# Patient Record
Sex: FEMALE | Race: WHITE | NOT HISPANIC OR LATINO | Employment: FULL TIME | URBAN - METROPOLITAN AREA
[De-identification: names, ages, dates, MRNs, and addresses within clinical notes are randomized per-mention and may not be internally consistent; named-entity substitution may affect disease eponyms.]

---

## 2017-05-08 ENCOUNTER — TELEPHONE (OUTPATIENT)
Dept: FAMILY MEDICINE | Facility: CLINIC | Age: 52
End: 2017-05-08

## 2017-05-08 ENCOUNTER — TELEPHONE (OUTPATIENT)
Dept: NURSING | Facility: CLINIC | Age: 52
End: 2017-05-08

## 2017-05-08 NOTE — TELEPHONE ENCOUNTER
Call Type: Triage Call    Presenting Problem: 2 month history of dizziness.   [facility] administrator Cat MONTAGUE calling, noted appt in clinic tomorrow  (5/9/17) at 16:00 for evaluation of 2 month history of dizziness.  Cat requesting patient be reached out to, to be scheduled in  regular clinic as [facility] not appropriate for such a  visit.  Did attempt to reach patient at 4:10, general message let to  call back to [facility] or if clinic closed, to choose triage  nurse option.  Patient needs appt rescheduled in regular clinic.  Triaged by FNA to be seen within 8 hours, for call placed at 10:33  am.  Triage Note:  Guideline Title: Information Only Call; No Symptom Triage (Adult)  Recommended Disposition: Provide Information or Advice Only  Original Inclination:  Override Disposition:  Intended Action:  Physician Contacted: No  Follow-up call to recent contact; no triage required. Information provided from  past call documentation, approved references or experience. ?  YES  Requesting regular office appointment ? NO  Sign(s) or symptom(s) associated with a diagnosed condition or with a new illness  ? NO  Requesting information about provider, services or community resources ? NO  Call back to complete assessment/clarification of information from prior caller to  complete triage ? NO  Requesting information and provider is best resource; no triage required. ? NO  Caller not with patient and is unable to provide clinical information about  patient to facilitate triage. ? NO  Requesting provider information for recently scheduled test, procedure; no triage  required. Needed information not available per approved resources or clinical  experience. ? NO  Requesting information not available per approved reference or clinical  experience; no triage required. ? NO  Requesting information regarding scheduled exam, test or procedure; no triage  required. Information provided from approved resources or  clinical experience. ?  NO  General information question; no triage required. Information provided from  approved references or knowledge of organization. ? NO  Health information question; person denies any symptoms, no triage required.  Information provided from approved references or clinical experience. ? NO  Physician Instructions:  Care Advice:

## 2017-05-08 NOTE — TELEPHONE ENCOUNTER
"Call Type: Triage Call    Presenting Problem: Perri is having \"dizziness\"  that started over a  couple of months ago.   No other symptoms.  [facility] ([facility] Triage/Dizziness or  Vertigo/disposition is to be seen within 8 hours and Perri agreed.  Triage Note:  Guideline Title: Dizziness or Vertigo  Recommended Disposition: See Provider within 8 Hours  Original Inclination: Wanted to speak with a nurse  Override Disposition:  Intended Action: Call PCP/HCP  Physician Contacted: No  Having sensations of turning or spinning that affects balance AND not responsive  to 4 hours of home care ?  YES  Any other GI bleeding ? NO  History of stroke OR transient ischemic attack (TIA) AND symptoms are similar ? NO  Signs of dehydration ? NO  Unconscious now ? NO  Severe breathing problems ? NO  New or worsening signs and symptoms that may indicate shock ? NO  Unusual vaginal bleeding occurring at times other than regular menses ? NO  New seizure now or within last 6 hours ? NO  Passing red, black or tarry material from rectum AND onset of new signs and  symptoms of hypovolemia ? NO  Unbearable abdominal/pelvic pain ? NO  Trauma from high-energy mechanism ? NO  Abruptly stopped or decreased dose of corticosteroids ? NO  Vomiting red, bloody or coffee-ground material, more than streaks of blood or  scant amount (not following nosebleed within past day) ? NO  New onset of decreased or complete hearing loss (may be related to ringing in  ear/s). ? NO  Sudden, severe disabling head pain OR caller spontaneously verbalizes \"worst  headache of my life\" ? NO  Chest discomfort associated with shortness of breath, sweating, odd heartbeats or  different heart rate, nausea, vomiting, lightheadedness, or fainting lasting 5 or  more minutes now or within the last hour ? NO  Chest pain spreading to the shoulders, neck, jaw, in one or both arms, stomach or  back lasting 5 or more minutes now or within the last hour. Pain is NOT  associated with taking a deep " breath or a productive cough, movement, or touch to  a localized area. ? NO  Pressure, fullness, squeezing sensation or pain anywhere in the chest lasting 5 or  more minutes now or within the last hour. Pain is NOT associated with taking a  deep breath or a productive cough, movement, or touch to a localized area on the  chest. ? NO  Known or suspected recent alcohol, illicit drug use, or misuse of prescribed  medication within the last 7 days ? NO  New numbness, weakness or paralysis involving face, arm or leg, especially on same  side of body, loss of balance or coordination, confusion or trouble speaking  occurring now or within last 8 hours ? NO  Physician Instructions:  Care Advice: Call provider if symptoms worsen or new symptoms develop.  CAUTIONS  SYMPTOM / CONDITION MANAGEMENT  List, or take, all current prescription(s), nonprescription or alternative  medication(s) to provider for evaluation.  Lie still in a dimly lit room and avoid any sudden change in position.  Consider taking nonprescription motion sickness medication (Antivert,  Dramamine) according to package or pharmacist's directions.  Avoid caffeine (coffee, tea, some soft drinks, some energy drinks, and  chocolate), alcohol, and nicotine (any use of tobacco)  using these substances may worsen symptoms.  Call  if patient develops confusion, decreased level of  consciousness, chest pain lasting 5 minutes or more, shortness of breath,  or focal neurologic abnormalities such as facial droop or weakness of one  extremity.  DO NOT drive or operate dangerous equipment until condition evaluated.

## 2017-05-08 NOTE — TELEPHONE ENCOUNTER
Pt made an appt to be seen in [facility] clinic. Pt was triaged and it was determined that pt needs to be seen here in [place] clinic. Pt notified of this information and appt made for her to be seen 5/9/17.      Irene Mckeon RN

## 2017-05-08 NOTE — TELEPHONE ENCOUNTER
Pt returning call. Please call back.    She is available at [phone].    [facility] [place]

## 2017-05-09 ENCOUNTER — OFFICE VISIT (OUTPATIENT)
Dept: FAMILY MEDICINE | Facility: CLINIC | Age: 52
End: 2017-05-09
Payer: COMMERCIAL

## 2017-05-09 VITALS
BODY MASS INDEX: 24.85 KG/M2 | RESPIRATION RATE: 16 BRPM | DIASTOLIC BLOOD PRESSURE: 84 MMHG | HEIGHT: 63 IN | TEMPERATURE: 99.2 F | SYSTOLIC BLOOD PRESSURE: 131 MMHG | WEIGHT: 140.25 LBS | HEART RATE: 81 BPM

## 2017-05-09 DIAGNOSIS — R42 VERTIGO: Primary | ICD-10-CM

## 2017-05-09 DIAGNOSIS — H61.23 BILATERAL IMPACTED CERUMEN: ICD-10-CM

## 2017-05-09 DIAGNOSIS — H93.13 TINNITUS, BILATERAL: ICD-10-CM

## 2017-05-09 DIAGNOSIS — E03.9 HYPOTHYROIDISM, UNSPECIFIED TYPE: ICD-10-CM

## 2017-05-09 LAB
T4 FREE SERPL-MCNC: 1.54 NG/DL (ref 0.76–1.46)
TSH SERPL DL<=0.05 MIU/L-ACNC: 0.11 MU/L (ref 0.4–4)

## 2017-05-09 PROCEDURE — 84439 ASSAY OF FREE THYROXINE: CPT | Performed by: FAMILY MEDICINE

## 2017-05-09 PROCEDURE — 84443 ASSAY THYROID STIM HORMONE: CPT | Performed by: FAMILY MEDICINE

## 2017-05-09 PROCEDURE — 99214 OFFICE O/P EST MOD 30 MIN: CPT | Performed by: NURSE PRACTITIONER

## 2017-05-09 PROCEDURE — 36415 COLL VENOUS BLD VENIPUNCTURE: CPT | Performed by: FAMILY MEDICINE

## 2017-05-09 NOTE — PROGRESS NOTES
SUBJECTIVE:                                                    Perri Scherer is a 51 year old female who presents to clinic today for the following health issues:      Dizziness      Duration: 1-2 months    Description   Feeling faint:  YES-   Feeling like the surroundings are moving: YES  Loss of consciousness or falls: no     Intensity:  mild    Accompanying signs and symptoms:   Nausea/vomitting: no   Palpitations: no   Weakness in arms or legs: YES  Vision or speech changes: no   Ringing in ears (Tinnitus): YES - has had ringing in her ears for years.  Hearing loss related to dizziness: no   Other (fevers/chills/sweating/dyspnea): yes - has been feeling colder than normal.    Denies associated chest pain, nausea, dyspnea, headache, diaphoresis, heartburn.       History (similar episodes/head trauma/previous evaluation/recent bleeding): None    Precipitating or alleviating factors (new meds/chemicals): None  Worse with activity/head movement: no     Therapies tried and outcome: nothing       PROBLEMS TO ADD ON...  Hypothyroidism Follow-up      Since last visit, patient describes the following symptoms: Weight stable, no hair loss, no skin changes, no constipation, no loose stools       Problem list and histories reviewed & adjusted, as indicated.  Additional history: as documented    Patient Active Problem List   Diagnosis     Nicotine use disorder     Family history of diabetes mellitus     Hyperlipidemia LDL goal <160     Hypothyroidism     Past Surgical History:   Procedure Laterality Date     TUBAL LIGATION  1992       Social History   Substance Use Topics     Smoking status: Light Tobacco Smoker     Types: Cigarettes     Smokeless tobacco: Never Used      Comment: 1 cigarette very rarely,  otherwise E-cigs only     Alcohol use Yes      Comment: occasional     Family History   Problem Relation Age of Onset     DIABETES Mother      C.A.D. Father      Alzheimer Disease Maternal Grandfather            Reviewed  "and updated as needed this visit by clinical staff  Allergies  Meds  Problems       Reviewed and updated as needed this visit by Provider  Allergies  Meds  Problems         ROS:  Constitutional, HEENT, cardiovascular, pulmonary, gi and gu systems are negative, except as otherwise noted.    OBJECTIVE:                                                    /84  Pulse 81  Temp 99.2  F (37.3  C) (Tympanic)  Resp 16  Ht 5' 3.39\" (1.61 m)  Wt 140 lb 4 oz (63.6 kg)  BMI 24.54 kg/m2  Body mass index is 24.54 kg/(m^2).  GENERAL: healthy, alert and no distress  EYES: Eyes grossly normal to inspection, PERRL and conjunctivae and sclerae normal  HENT: normal cephalic/atraumatic, both ears: occluded with wax, nose and mouth without ulcers or lesions, oropharynx clear and oral mucous membranes moist  NECK: no adenopathy, no asymmetry, masses, or scars and thyroid normal to palpation  RESP: lungs clear to auscultation - no rales, rhonchi or wheezes  CV: regular rates and rhythm, normal S1 S2, no S3 or S4 and no murmur, click or rub  MS: no gross musculoskeletal defects noted, no edema  SKIN: no suspicious lesions or rashes  NEURO: Normal strength and tone, sensory exam grossly normal, mentation intact, cranial nerves 2-12 intact, DTR's normal and symmetric 2+, gait normal, Romberg normal and mal-hallpike negative bilateral.  PSYCH: mentation appears normal, affect normal/bright    Diagnostic Test Results:  none      ASSESSMENT/PLAN:                                                        ICD-10-CM    1. Vertigo R42 OTOLARYNGOLOGY REFERRAL   2. Tinnitus, bilateral H93.13 OTOLARYNGOLOGY REFERRAL   3. Bilateral impacted cerumen H61.23 carbamide peroxide (DEBROX) 6.5 % otic solution   4. Hypothyroidism, unspecified type E03.9 TSH     T4 FREE       CONSULTATION/REFERRAL to ENT for persistent sx. Evaluate for Meniere's disease? Exam is not consistent with positional vertigo, I don't think she would benefit from physical " therapy. Ears irrigated per MA, and upon recheck her right TM was normal. Make a MA ONLY appointment to have left ear flushed after at least 4 days of ear drops.    Patient Instructions       Impacted Earwax  Impacted earwax is a buildup of the natural wax in the ear (cerumen). Impacted earwax is very common. It can cause symptoms such as hearing loss. It can also stop a doctor doing an exam of your ear.  Understanding earwax  Tiny glands in your ear make substances that combine with dead skin cells to form earwax. Earwax helps protect your ear canal from water, dirt, infection, and injury. Over time, earwax travels from the inner part of your ear canal to the entrance of the canal. Then it falls away naturally. But in some cases, it can t travel to the entrance of the canal. This may be because of a health condition or objects put in the ear. With age, earwax tends to become harder and less fluid. Older adults are more likely to have problems with earwax buildup.  What causes impacted earwax?  Earwax can build up because of many health conditions. Some cause a physical blockage. Others cause too much earwax to be made. Health conditions that can cause earwax buildup include:    Bony blockage in the ear (osteoma or exostoses)    Infections, such as swimmer s ear (external otitis)    Skin disease, such as eczema    Autoimmune diseases, such as lupus    A narrowed ear canal from birth, chronic inflammation, or injury    Too much earwax because of injury    Too much earwax because of  water in the ear canal  Objects repeatedly placed in the ear can also cause impacted earwax. For example, putting cotton swabs in the ear may push the wax deeper into the ear. Over time, this may cause blockage. Hearing aids, swimming plugs, and swim molds can cause the same problem when used again and again.  In some cases, the cause of impacted earwax is not known.  Symptoms of impacted earwax  Excess earwax usually does not cause any  symptoms, unless there is a large amount of buildup. Then it may cause symptoms such as:    Hearing loss    Earache    Sense of ear fullness    Itching in the ear    Dizziness    Ringing in the ears    Cough  Treatment for impacted earwax  If you don t have symptoms, you may not need treatment. Often the earwax goes away on its own with time. If you have symptoms, you may have 1 or more treatments such as:    Ear drops. These help to soften the earwax. This helps it leave the ear over time.    Rinsing (irrigation) of the ear canal with water. This is done in a doctor s office.    Removal of the earwax with small tools. This is also done in a doctor s office.  In rare cases, some treatments for earwax removal may cause complications such as:    Swimmer s ear (otitis external)    Earache    Short-term hearing loss    Dizziness    Water trapped in the ear canal    Hole in the eardrum    Ringing in the ears    Bleeding from the ear  Talk with your health care provider about which risks apply most to you.  Don t use these at home  Health care providers do not advise use of ear candles or ear vacuum kits. These methods are not shown to work.   Preventing impacted earwax  You may not be able to prevent impacted earwax if you have a health condition that causes it, such as eczema. In other cases, you may be able to prevent earwax buildup by:    Using ear drops once a week    Having routine cleaning of the ear about every 6 months    Not using cotton swabs in the ear  When to call the health care provider  Call your health care provider right away if you have severe symptoms after earwax removal. These may include bleeding or severe ear pain.        6934-4899 The [employer]. [address] [place] [address], [place] [address]. All rights reserved. This information is not intended as a substitute for professional medical care. Always follow your healthcare professional's instructions.        Inner Ear Problems: Causes of  Dizziness (Vertigo)  Benign positional vertigo (BPV)    This is the most common cause of vertigo. BPV (also called benign positional paroxysmal vertigo or BPPV) results when crystals in the canals shift into the wrong position. Episodes usually occur when the head is moved in a certain way. This can happen when turning in bed, bending, or looking up.  BPV:    Causes episodes of vertigo that last for seconds. These episodes can occur several times a day, depending on body position.    Doesn t cause hearing loss.    Often goes away on its own, but may go away sooner with treatment.  Infection or inflammation    Sometimes the semicircular canals swell and send incorrect balance signals. This problem may be caused by a viral infection. Depending on the cause, hearing can be affected (labyrinthitis) or can remain normal (neuronitis).   Infection or inflammation:    Causes episodes of vertigo that last for hours or days. The first episode is usually the worst.    Can cause hearing loss.    Often goes away on its own, but may go away sooner with treatment.    May require vestibular rehabilitation if you have persistent imbalance.  Meniere s disease    Although uncommon, this condition happens when there is too much fluid in the canals. This causes increased pressure and swelling, and affects balance and hearing signals.  Meniere s disease may:    Cause episodes of vertigo that last for hours.    Cause fluctuating hearing problems, usually in one ear, that worsen over time.    Cause buzzing or ringing in the ears (tinnitus).    Cause a feeling of fullness or pressure in the ear.    Cause any of these symptoms: vertigo, hearing loss, tinnitus, or ear fullness to last a lifetime.    6232-6642 [employer]. [address] [place] [address], [place] [address]. All rights reserved. This information is not intended as a substitute for professional medical care. Always follow your healthcare professional's  instructions.        Vertigo (Unknown Cause)    In addition to helping with hearing, the inner ear is part of the balance center of your body. Problems with the inner ear can a false feeling of motion. This is called vertigo. Often, it feels as if you or the room is spinning. A vertigo attack may cause sudden nausea, vomiting and heavy sweating. Severe vertigo causes a loss of balance and can cause you to fall. During vertigo, small head movements and changes in body position will often make the symptoms worse. You may also have ringing in the ears called tinnitus.  An episode of vertigo may last seconds, minutes or hours. Once you are over the first episode, it may never come back. However, symptoms may return off and on.  The cause of your vertigo is not yet known. Possible causes of vertigo include:    Inflammation of the inner ear    Disease of the nerves to the inner ear    Movement of calcium particles in the inner ear    Poor blood flow to the balance centers of the brain    Migraine headaches  Home care    If symptoms are severe, rest quietly in bed. Change positions very slowly. There is usually one position that will feel best, such as lying on one side or lying on your back with your head slightly raised on pillows.    Do not drive a car or work with dangerous machinery until symptoms have been gone for at least one week.    Take medicine as prescribed to relieve your symptoms. Unless another medicine was prescribed for symptoms of nausea, vomiting, and dizziness, you may use over-the-counter motion sickness pills. Ask your pharmacist for suggestions.  Follow-up care  Follow up with your healthcare provider or as directed. If you are referred to a specialist or for testing, make the appointment promptly.  When to seek medical advice  Call your healthcare provider if any of the following occur:    Fever of 100.4 F (38 C) or higher, or as directed by your healthcare provider    Vertigo worsens or is not  controlled by prescribed medicine     Repeated vomiting not relieved by prescribed medicine     Severe headache    Confusion    Weakness of an arm or leg or one side of the face    Difficulty with speech or vision    Loss of consciousness     Seizure    1541-3720 [employer]. [address] [place] [address], [place] [address]. All rights reserved. This information is not intended as a substitute for professional medical care. Always follow your healthcare professional's instructions.        Tinnitus (Ringing in the Ears)  Tinnitus is the term for a noise in your ear not caused by an outside sound. The noise might be a ringing, clicking, hiss, or roar. It can vary in pitch and may be soft or quite loud. For some people, tinnitus is a minor nuisance. But for others, the noise can make it hard to hear, work, and even sleep. When tinnitus can't be cured, a number of treatments may offer relief.     A radio tuned to static or a masker may help block out bothersome tinnitus.     What Causes Tinnitus?  Loud noises, hearing loss, and ear wax can cause tinnitus. So can certain medications. Large amounts of aspirin or caffeine are sometimes to blame. In many cases, the exact cause of tinnitus is unknown.  How Is Tinnitus Treated?  Identifying and removing the cause is the best way to treat tinnitus. For that reason, your health care provider may refer you to an otolaryngologist (ear, nose, and throat doctor). Your hearing may also be checked by an audiologist (hearing specialist). If you have hearing loss, wearing a hearing aid may help your tinnitus. When the cause can't be found, the tinnitus itself may be treated. Some of the treatments are listed below: Your health care provider can tell you more about them:    Maskers are small devices that look like hearing aids. They emit a pleasant sound that helps cover up the ringing in your ears. Hearing aids and maskers are sometimes used together.    Medications that treat  anxiety and depression may ease tinnitus in some people.    Hypnosis or relaxation therapy may help head noise seem less severe.    Tinnitus retraining therapy combines counseling and maskers. Both can help take your mind off the tinnitus.  For More Information    American Speech-Hearing-Language Association  119.425.6371  www.patricia.org    American Tinnitus Association  749.693.3320  www.xander.org    [employer] [place] on Deafness and other Communication Disorders  469.218.7683  www.nidcd.nih.gov     1146-4576 [employer]. [address] [place] [address]. All rights reserved. This information is not intended as a substitute for professional medical care. Always follow your healthcare professional's instructions.        Meniere s Disease    Meniere s disease is a chronic recurring condition. It is due to a problem with the inner ear, the part of the ear responsible for balance as well as hearing. Symptoms include:    Sudden attacks of vertigo: Vertigo is a spinning or whirling feeling that causes balance problems. These attacks often include nausea, vomiting, and sweating. During an attack of vertigo, head movement and body position changes will worsen symptoms.    Hearing problems: Hearing is often partly or completely lost during the vertigo attack, then comes back. Over time, though, hearing can be affected.    Tinnitus: This is ringing, buzzing, whistling, or roaring noises in the ear. It may come and go or always be present.     A feeling of pressure of fullness in the ear.  Attacks may occur weeks, months, or even years apart. Each episode of vertigo may last 20 minutes to several hours. The symptoms are due to changes in fluid in the inner ear canals, but the exact cause is not known.   Treatment for Meniere s disease includes lifestyle changes, medicines, and medical procedures. Surgery may be recommended in severe cases.  Home care  Medicines  You may be prescribed medicine to take  regularly to help prevent attacks. You may also be prescribed medicine to take only during attacks. Take these as directed.  Lifestyle changes  These lifestyle changes can help make attacks less frequent or less severe.     Reduce your salt intake. Eating too much salt can make this condition worse. A common recommendation is to limit sodium to no more than 2,300 mg a day. Talk to your healthcare provider about ways to limit sodium in your diet.    Quit smoking.    Limit alcohol and caffeine.    Try to limit stress.  During Attacks  If an attack is about to start or has started, take any medicine you have been prescribed for an attack. Lie down on a firm surface in a darkened room. Stay as still as possible. Avoid bright light. Do not try to read or watch TV. Don't get up until the spinning passes.  Follow-up care  Follow up with your healthcare provider for further evaluation and treatment. If you have been referred to a specialist, schedule that appointment promptly.  When to seek medical advice  Call your healthcare provider right away if you have:    Worsening of symptoms    Increased weakness or fainting    Headache or unusual drowsiness    Difficulty with speech or movement    Fever of 100.4 F (38 C) or higher, or as directed by your healthcare provider    2170-8537 [employer]. [address] [place] [address], [place] [id number]. All rights reserved. This information is not intended as a substitute for professional medical care. Always follow your healthcare professional's instructions.        Treating Meniere s Disease: Lifestyle Changes    Certain changes may help you manage Meniere s disease. Some of these changes are minor. Others require more work. They include avoiding certain substances. Special devices may also help make you more comfortable and improve your hearing.  Avoid Certain Substances  Certain substances affect how your body regulates fluid, and can make Meniere s disease worse. These  include:    Caffeine. Caffeine narrows your blood vessels. This reduces blood flow to your inner ear. Avoid drinks and foods that are high in caffeine, such as coffee, cola, and chocolate.    Alcohol. Alcohol can upset your sense of balance. Avoid alcohol, or limit it to very small amounts.    Smoking. Tobacco smoke narrows your blood vessels, weakens your immune system, and harms your general health. By affecting your circulation, smoking may contribute to Meniere s symptoms. Quitting smoking is always a good idea.     A hearing aid can help improve your hearing.        A radio tuned to static may help block out bothersome tinnitus.   Explore Helpful Devices  If Meniere s disease has permanently affected your hearing, a hearing aid may help you hear better. Hearing aids come in many different models. You can find one that is best for your needs and lifestyle. Other devices can help cover up tinnitus. A fan or a radio tuned to music or static, or a white-noise device specifically designed to create background noise may help. A masking device that makes white noise all the time can be worn directly in the ear. Ask your health care provider if these devices are right for you.  Pay Attention to Your Body  People with Meniere s disease sometimes find that such things as bright lights, loud noises, or very low sounds bring on symptoms or make the symptoms worse. Pay attention to how you feel. If something makes you feel worse, talk with your health care provider.    8364-0638 The [employer]. [address] [place] [address], [place] [address]. All rights reserved. This information is not intended as a substitute for professional medical care. Always follow your healthcare professional's instructions.            KATEY Castro [facility]

## 2017-05-09 NOTE — PATIENT INSTRUCTIONS
Impacted Earwax  Impacted earwax is a buildup of the natural wax in the ear (cerumen). Impacted earwax is very common. It can cause symptoms such as hearing loss. It can also stop a doctor doing an exam of your ear.  Understanding earwax  Tiny glands in your ear make substances that combine with dead skin cells to form earwax. Earwax helps protect your ear canal from water, dirt, infection, and injury. Over time, earwax travels from the inner part of your ear canal to the entrance of the canal. Then it falls away naturally. But in some cases, it can t travel to the entrance of the canal. This may be because of a health condition or objects put in the ear. With age, earwax tends to become harder and less fluid. Older adults are more likely to have problems with earwax buildup.  What causes impacted earwax?  Earwax can build up because of many health conditions. Some cause a physical blockage. Others cause too much earwax to be made. Health conditions that can cause earwax buildup include:    Bony blockage in the ear (osteoma or exostoses)    Infections, such as swimmer s ear (external otitis)    Skin disease, such as eczema    Autoimmune diseases, such as lupus    A narrowed ear canal from birth, chronic inflammation, or injury    Too much earwax because of injury    Too much earwax because of  water in the ear canal  Objects repeatedly placed in the ear can also cause impacted earwax. For example, putting cotton swabs in the ear may push the wax deeper into the ear. Over time, this may cause blockage. Hearing aids, swimming plugs, and swim molds can cause the same problem when used again and again.  In some cases, the cause of impacted earwax is not known.  Symptoms of impacted earwax  Excess earwax usually does not cause any symptoms, unless there is a large amount of buildup. Then it may cause symptoms such as:    Hearing loss    Earache    Sense of ear fullness    Itching in the ear    Dizziness    Ringing in  the ears    Cough  Treatment for impacted earwax  If you don t have symptoms, you may not need treatment. Often the earwax goes away on its own with time. If you have symptoms, you may have 1 or more treatments such as:    Ear drops. These help to soften the earwax. This helps it leave the ear over time.    Rinsing (irrigation) of the ear canal with water. This is done in a doctor s office.    Removal of the earwax with small tools. This is also done in a doctor s office.  In rare cases, some treatments for earwax removal may cause complications such as:    Swimmer s ear (otitis external)    Earache    Short-term hearing loss    Dizziness    Water trapped in the ear canal    Hole in the eardrum    Ringing in the ears    Bleeding from the ear  Talk with your health care provider about which risks apply most to you.  Don t use these at home  Health care providers do not advise use of ear candles or ear vacuum kits. These methods are not shown to work.   Preventing impacted earwax  You may not be able to prevent impacted earwax if you have a health condition that causes it, such as eczema. In other cases, you may be able to prevent earwax buildup by:    Using ear drops once a week    Having routine cleaning of the ear about every 6 months    Not using cotton swabs in the ear  When to call the health care provider  Call your health care provider right away if you have severe symptoms after earwax removal. These may include bleeding or severe ear pain.        6874-3275 [employer]. [address] [place], [place] [id number]. All rights reserved. This information is not intended as a substitute for professional medical care. Always follow your healthcare professional's instructions.        Inner Ear Problems: Causes of Dizziness (Vertigo)  Benign positional vertigo (BPV)    This is the most common cause of vertigo. BPV (also called benign positional paroxysmal vertigo or BPPV) results when crystals in the  canals shift into the wrong position. Episodes usually occur when the head is moved in a certain way. This can happen when turning in bed, bending, or looking up.  BPV:    Causes episodes of vertigo that last for seconds. These episodes can occur several times a day, depending on body position.    Doesn t cause hearing loss.    Often goes away on its own, but may go away sooner with treatment.  Infection or inflammation    Sometimes the semicircular canals swell and send incorrect balance signals. This problem may be caused by a viral infection. Depending on the cause, hearing can be affected (labyrinthitis) or can remain normal (neuronitis).   Infection or inflammation:    Causes episodes of vertigo that last for hours or days. The first episode is usually the worst.    Can cause hearing loss.    Often goes away on its own, but may go away sooner with treatment.    May require vestibular rehabilitation if you have persistent imbalance.  Meniere s disease    Although uncommon, this condition happens when there is too much fluid in the canals. This causes increased pressure and swelling, and affects balance and hearing signals.  Meniere s disease may:    Cause episodes of vertigo that last for hours.    Cause fluctuating hearing problems, usually in one ear, that worsen over time.    Cause buzzing or ringing in the ears (tinnitus).    Cause a feeling of fullness or pressure in the ear.    Cause any of these symptoms: vertigo, hearing loss, tinnitus, or ear fullness to last a lifetime.    5023-6739 [employer]. [address] [place] [id number]. All rights reserved. This information is not intended as a substitute for professional medical care. Always follow your healthcare professional's instructions.        Vertigo (Unknown Cause)    In addition to helping with hearing, the inner ear is part of the balance center of your body. Problems with the inner ear can a false feeling of motion. This is  called vertigo. Often, it feels as if you or the room is spinning. A vertigo attack may cause sudden nausea, vomiting and heavy sweating. Severe vertigo causes a loss of balance and can cause you to fall. During vertigo, small head movements and changes in body position will often make the symptoms worse. You may also have ringing in the ears called tinnitus.  An episode of vertigo may last seconds, minutes or hours. Once you are over the first episode, it may never come back. However, symptoms may return off and on.  The cause of your vertigo is not yet known. Possible causes of vertigo include:    Inflammation of the inner ear    Disease of the nerves to the inner ear    Movement of calcium particles in the inner ear    Poor blood flow to the balance centers of the brain    Migraine headaches  Home care    If symptoms are severe, rest quietly in bed. Change positions very slowly. There is usually one position that will feel best, such as lying on one side or lying on your back with your head slightly raised on pillows.    Do not drive a car or work with dangerous machinery until symptoms have been gone for at least one week.    Take medicine as prescribed to relieve your symptoms. Unless another medicine was prescribed for symptoms of nausea, vomiting, and dizziness, you may use over-the-counter motion sickness pills. Ask your pharmacist for suggestions.  Follow-up care  Follow up with your healthcare provider or as directed. If you are referred to a specialist or for testing, make the appointment promptly.  When to seek medical advice  Call your healthcare provider if any of the following occur:    Fever of 100.4 F (38 C) or higher, or as directed by your healthcare provider    Vertigo worsens or is not controlled by prescribed medicine     Repeated vomiting not relieved by prescribed medicine     Severe headache    Confusion    Weakness of an arm or leg or one side of the face    Difficulty with speech or  vision    Loss of consciousness     Seizure    3683-2107 [employer]. [address] [place] [address], [place] [address]. All rights reserved. This information is not intended as a substitute for professional medical care. Always follow your healthcare professional's instructions.        Tinnitus (Ringing in the Ears)  Tinnitus is the term for a noise in your ear not caused by an outside sound. The noise might be a ringing, clicking, hiss, or roar. It can vary in pitch and may be soft or quite loud. For some people, tinnitus is a minor nuisance. But for others, the noise can make it hard to hear, work, and even sleep. When tinnitus can't be cured, a number of treatments may offer relief.     A radio tuned to static or a masker may help block out bothersome tinnitus.     What Causes Tinnitus?  Loud noises, hearing loss, and ear wax can cause tinnitus. So can certain medications. Large amounts of aspirin or caffeine are sometimes to blame. In many cases, the exact cause of tinnitus is unknown.  How Is Tinnitus Treated?  Identifying and removing the cause is the best way to treat tinnitus. For that reason, your health care provider may refer you to an otolaryngologist (ear, nose, and throat doctor). Your hearing may also be checked by an audiologist (hearing specialist). If you have hearing loss, wearing a hearing aid may help your tinnitus. When the cause can't be found, the tinnitus itself may be treated. Some of the treatments are listed below: Your health care provider can tell you more about them:    Maskers are small devices that look like hearing aids. They emit a pleasant sound that helps cover up the ringing in your ears. Hearing aids and maskers are sometimes used together.    Medications that treat anxiety and depression may ease tinnitus in some people.    Hypnosis or relaxation therapy may help head noise seem less severe.    Tinnitus retraining therapy combines counseling and maskers. Both can help  take your mind off the tinnitus.  For More Information    American Speech-Hearing-Language Association  [phone]  www.patricia.org    American Tinnitus Association  581.256.3876  www.xander.org    National [employer]ness [employer] other Communication Disorders  402.908.9198  www.nidcd.nih.gov     4924-4847 [employer]. [address] [place] [address]. All rights reserved. This information is not intended as a substitute for professional medical care. Always follow your healthcare professional's instructions.        Meniere s Disease    Meniere s disease is a chronic recurring condition. It is due to a problem with the inner ear, the part of the ear responsible for balance as well as hearing. Symptoms include:    Sudden attacks of vertigo: Vertigo is a spinning or whirling feeling that causes balance problems. These attacks often include nausea, vomiting, and sweating. During an attack of vertigo, head movement and body position changes will worsen symptoms.    Hearing problems: Hearing is often partly or completely lost during the vertigo attack, then comes back. Over time, though, hearing can be affected.    Tinnitus: This is ringing, buzzing, whistling, or roaring noises in the ear. It may come and go or always be present.     A feeling of pressure of fullness in the ear.  Attacks may occur weeks, months, or even years apart. Each episode of vertigo may last 20 minutes to several hours. The symptoms are due to changes in fluid in the inner ear canals, but the exact cause is not known.   Treatment for Meniere s disease includes lifestyle changes, medicines, and medical procedures. Surgery may be recommended in severe cases.  Home care  Medicines  You may be prescribed medicine to take regularly to help prevent attacks. You may also be prescribed medicine to take only during attacks. Take these as directed.  Lifestyle changes  These lifestyle changes can help make attacks less frequent or less  severe.     Reduce your salt intake. Eating too much salt can make this condition worse. A common recommendation is to limit sodium to no more than 2,300 mg a day. Talk to your healthcare provider about ways to limit sodium in your diet.    Quit smoking.    Limit alcohol and caffeine.    Try to limit stress.  During Attacks  If an attack is about to start or has started, take any medicine you have been prescribed for an attack. Lie down on a firm surface in a darkened room. Stay as still as possible. Avoid bright light. Do not try to read or watch TV. Don't get up until the spinning passes.  Follow-up care  Follow up with your healthcare provider for further evaluation and treatment. If you have been referred to a specialist, schedule that appointment promptly.  When to seek medical advice  Call your healthcare provider right away if you have:    Worsening of symptoms    Increased weakness or fainting    Headache or unusual drowsiness    Difficulty with speech or movement    Fever of 100.4 F (38 C) or higher, or as directed by your healthcare provider    8474-9619 [employer]. [address] [place] [address]. All rights reserved. This information is not intended as a substitute for professional medical care. Always follow your healthcare professional's instructions.        Treating Meniere s Disease: Lifestyle Changes    Certain changes may help you manage Meniere s disease. Some of these changes are minor. Others require more work. They include avoiding certain substances. Special devices may also help make you more comfortable and improve your hearing.  Avoid Certain Substances  Certain substances affect how your body regulates fluid, and can make Meniere s disease worse. These include:    Caffeine. Caffeine narrows your blood vessels. This reduces blood flow to your inner ear. Avoid drinks and foods that are high in caffeine, such as coffee, cola, and chocolate.    Alcohol. Alcohol can  upset your sense of balance. Avoid alcohol, or limit it to very small amounts.    Smoking. Tobacco smoke narrows your blood vessels, weakens your immune system, and harms your general health. By affecting your circulation, smoking may contribute to Meniere s symptoms. Quitting smoking is always a good idea.     A hearing aid can help improve your hearing.        A radio tuned to static may help block out bothersome tinnitus.   Explore Helpful Devices  If Meniere s disease has permanently affected your hearing, a hearing aid may help you hear better. Hearing aids come in many different models. You can find one that is best for your needs and lifestyle. Other devices can help cover up tinnitus. A fan or a radio tuned to music or static, or a white-noise device specifically designed to create background noise may help. A masking device that makes white noise all the time can be worn directly in the ear. Ask your health care provider if these devices are right for you.  Pay Attention to Your Body  People with Meniere s disease sometimes find that such things as bright lights, loud noises, or very low sounds bring on symptoms or make the symptoms worse. Pay attention to how you feel. If something makes you feel worse, talk with your health care provider.    0579-8921 [employer]. [address] [place] [address], [place] [address]. All rights reserved. This information is not intended as a substitute for professional medical care. Always follow your healthcare professional's instructions.

## 2017-05-09 NOTE — MR AVS SNAPSHOT
After Visit Summary   5/9/2017    Perri Scherer    MRN: [id number]           Patient Information     Date Of Birth          1965        Visit Information        Provider Department      5/9/2017 3:00 PM Bhavna Rosen APRN [facility]        Today's Diagnoses     Vertigo    -  1    Hypothyroidism, unspecified type        Tinnitus, bilateral        Bilateral impacted cerumen          Care Instructions      Impacted Earwax  Impacted earwax is a buildup of the natural wax in the ear (cerumen). Impacted earwax is very common. It can cause symptoms such as hearing loss. It can also stop a doctor doing an exam of your ear.  Understanding earwax  Tiny glands in your ear make substances that combine with dead skin cells to form earwax. Earwax helps protect your ear canal from water, dirt, infection, and injury. Over time, earwax travels from the inner part of your ear canal to the entrance of the canal. Then it falls away naturally. But in some cases, it can t travel to the entrance of the canal. This may be because of a health condition or objects put in the ear. With age, earwax tends to become harder and less fluid. Older adults are more likely to have problems with earwax buildup.  What causes impacted earwax?  Earwax can build up because of many health conditions. Some cause a physical blockage. Others cause too much earwax to be made. Health conditions that can cause earwax buildup include:    Bony blockage in the ear (osteoma or exostoses)    Infections, such as swimmer s ear (external otitis)    Skin disease, such as eczema    Autoimmune diseases, such as lupus    A narrowed ear canal from birth, chronic inflammation, or injury    Too much earwax because of injury    Too much earwax because of  water in the ear canal  Objects repeatedly placed in the ear can also cause impacted earwax. For example, putting cotton swabs in the ear may push the wax deeper into the ear. Over  time, this may cause blockage. Hearing aids, swimming plugs, and swim molds can cause the same problem when used again and again.  In some cases, the cause of impacted earwax is not known.  Symptoms of impacted earwax  Excess earwax usually does not cause any symptoms, unless there is a large amount of buildup. Then it may cause symptoms such as:    Hearing loss    Earache    Sense of ear fullness    Itching in the ear    Dizziness    Ringing in the ears    Cough  Treatment for impacted earwax  If you don t have symptoms, you may not need treatment. Often the earwax goes away on its own with time. If you have symptoms, you may have 1 or more treatments such as:    Ear drops. These help to soften the earwax. This helps it leave the ear over time.    Rinsing (irrigation) of the ear canal with water. This is done in a doctor s office.    Removal of the earwax with small tools. This is also done in a doctor s office.  In rare cases, some treatments for earwax removal may cause complications such as:    Swimmer s ear (otitis external)    Earache    Short-term hearing loss    Dizziness    Water trapped in the ear canal    Hole in the eardrum    Ringing in the ears    Bleeding from the ear  Talk with your health care provider about which risks apply most to you.  Don t use these at home  Health care providers do not advise use of ear candles or ear vacuum kits. These methods are not shown to work.   Preventing impacted earwax  You may not be able to prevent impacted earwax if you have a health condition that causes it, such as eczema. In other cases, you may be able to prevent earwax buildup by:    Using ear drops once a week    Having routine cleaning of the ear about every 6 months    Not using cotton swabs in the ear  When to call the health care provider  Call your health care provider right away if you have severe symptoms after earwax removal. These may include bleeding or severe ear pain.        4446-4608 [employer]. [address] [place] [address], [place] [address]. All rights reserved. This information is not intended as a substitute for professional medical care. Always follow your healthcare professional's instructions.        Inner Ear Problems: Causes of Dizziness (Vertigo)  Benign positional vertigo (BPV)    This is the most common cause of vertigo. BPV (also called benign positional paroxysmal vertigo or BPPV) results when crystals in the canals shift into the wrong position. Episodes usually occur when the head is moved in a certain way. This can happen when turning in bed, bending, or looking up.  BPV:    Causes episodes of vertigo that last for seconds. These episodes can occur several times a day, depending on body position.    Doesn t cause hearing loss.    Often goes away on its own, but may go away sooner with treatment.  Infection or inflammation    Sometimes the semicircular canals swell and send incorrect balance signals. This problem may be caused by a viral infection. Depending on the cause, hearing can be affected (labyrinthitis) or can remain normal (neuronitis).   Infection or inflammation:    Causes episodes of vertigo that last for hours or days. The first episode is usually the worst.    Can cause hearing loss.    Often goes away on its own, but may go away sooner with treatment.    May require vestibular rehabilitation if you have persistent imbalance.  Meniere s disease    Although uncommon, this condition happens when there is too much fluid in the canals. This causes increased pressure and swelling, and affects balance and hearing signals.  Meniere s disease may:    Cause episodes of vertigo that last for hours.    Cause fluctuating hearing problems, usually in one ear, that worsen over time.    Cause buzzing or ringing in the ears (tinnitus).    Cause a feeling of fullness or pressure in the ear.    Cause any of these symptoms: vertigo, hearing loss, tinnitus, or ear fullness to last  a lifetime.    5901-5959 [employer]. [address] [place] [address], [place] [address]. All rights reserved. This information is not intended as a substitute for professional medical care. Always follow your healthcare professional's instructions.        Vertigo (Unknown Cause)    In addition to helping with hearing, the inner ear is part of the balance center of your body. Problems with the inner ear can a false feeling of motion. This is called vertigo. Often, it feels as if you or the room is spinning. A vertigo attack may cause sudden nausea, vomiting and heavy sweating. Severe vertigo causes a loss of balance and can cause you to fall. During vertigo, small head movements and changes in body position will often make the symptoms worse. You may also have ringing in the ears called tinnitus.  An episode of vertigo may last seconds, minutes or hours. Once you are over the first episode, it may never come back. However, symptoms may return off and on.  The cause of your vertigo is not yet known. Possible causes of vertigo include:    Inflammation of the inner ear    Disease of the nerves to the inner ear    Movement of calcium particles in the inner ear    Poor blood flow to the balance centers of the brain    Migraine headaches  Home care    If symptoms are severe, rest quietly in bed. Change positions very slowly. There is usually one position that will feel best, such as lying on one side or lying on your back with your head slightly raised on pillows.    Do not drive a car or work with dangerous machinery until symptoms have been gone for at least one week.    Take medicine as prescribed to relieve your symptoms. Unless another medicine was prescribed for symptoms of nausea, vomiting, and dizziness, you may use over-the-counter motion sickness pills. Ask your pharmacist for suggestions.  Follow-up care  Follow up with your healthcare provider or as directed. If you are referred to a specialist or for  testing, make the appointment promptly.  When to seek medical advice  Call your healthcare provider if any of the following occur:    Fever of 100.4 F (38 C) or higher, or as directed by your healthcare provider    Vertigo worsens or is not controlled by prescribed medicine     Repeated vomiting not relieved by prescribed medicine     Severe headache    Confusion    Weakness of an arm or leg or one side of the face    Difficulty with speech or vision    Loss of consciousness     Seizure    [phone] [employer]. [address] [place] [address], [place] [address]. All rights reserved. This information is not intended as a substitute for professional medical care. Always follow your healthcare professional's instructions.        Tinnitus (Ringing in the Ears)  Tinnitus is the term for a noise in your ear not caused by an outside sound. The noise might be a ringing, clicking, hiss, or roar. It can vary in pitch and may be soft or quite loud. For some people, tinnitus is a minor nuisance. But for others, the noise can make it hard to hear, work, and even sleep. When tinnitus can't be cured, a number of treatments may offer relief.     A radio tuned to static or a masker may help block out bothersome tinnitus.     What Causes Tinnitus?  Loud noises, hearing loss, and ear wax can cause tinnitus. So can certain medications. Large amounts of aspirin or caffeine are sometimes to blame. In many cases, the exact cause of tinnitus is unknown.  How Is Tinnitus Treated?  Identifying and removing the cause is the best way to treat tinnitus. For that reason, your health care provider may refer you to an otolaryngologist (ear, nose, and throat doctor). Your hearing may also be checked by an audiologist (hearing specialist). If you have hearing loss, wearing a hearing aid may help your tinnitus. When the cause can't be found, the tinnitus itself may be treated. Some of the treatments are listed below: Your health care provider  can tell you more about them:    Maskers are small devices that look like hearing aids. They emit a pleasant sound that helps cover up the ringing in your ears. Hearing aids and maskers are sometimes used together.    Medications that treat anxiety and depression may ease tinnitus in some people.    Hypnosis or relaxation therapy may help head noise seem less severe.    Tinnitus retraining therapy combines counseling and maskers. Both can help take your mind off the tinnitus.  For More Information    American Speech-Hearing-Language Association  849.851.1386  www.patricia.org    American Tinnitus Association  432.157.1478  www.xander.org    National [place] on Deafness and other Communication Disorders  866.209.6548  www.nidcd.nih.gov     0248-7711 [employer]. [address] [place] [address], [place] [address]. All rights reserved. This information is not intended as a substitute for professional medical care. Always follow your healthcare professional's instructions.        Meniere s Disease    Meniere s disease is a chronic recurring condition. It is due to a problem with the inner ear, the part of the ear responsible for balance as well as hearing. Symptoms include:    Sudden attacks of vertigo: Vertigo is a spinning or whirling feeling that causes balance problems. These attacks often include nausea, vomiting, and sweating. During an attack of vertigo, head movement and body position changes will worsen symptoms.    Hearing problems: Hearing is often partly or completely lost during the vertigo attack, then comes back. Over time, though, hearing can be affected.    Tinnitus: This is ringing, buzzing, whistling, or roaring noises in the ear. It may come and go or always be present.     A feeling of pressure of fullness in the ear.  Attacks may occur weeks, months, or even years apart. Each episode of vertigo may last 20 minutes to several hours. The symptoms are due to changes in fluid in the inner ear canals,  but the exact cause is not known.   Treatment for Meniere s disease includes lifestyle changes, medicines, and medical procedures. Surgery may be recommended in severe cases.  Home care  Medicines  You may be prescribed medicine to take regularly to help prevent attacks. You may also be prescribed medicine to take only during attacks. Take these as directed.  Lifestyle changes  These lifestyle changes can help make attacks less frequent or less severe.     Reduce your salt intake. Eating too much salt can make this condition worse. A common recommendation is to limit sodium to no more than 2,300 mg a day. Talk to your healthcare provider about ways to limit sodium in your diet.    Quit smoking.    Limit alcohol and caffeine.    Try to limit stress.  During Attacks  If an attack is about to start or has started, take any medicine you have been prescribed for an attack. Lie down on a firm surface in a darkened room. Stay as still as possible. Avoid bright light. Do not try to read or watch TV. Don't get up until the spinning passes.  Follow-up care  Follow up with your healthcare provider for further evaluation and treatment. If you have been referred to a specialist, schedule that appointment promptly.  When to seek medical advice  Call your healthcare provider right away if you have:    Worsening of symptoms    Increased weakness or fainting    Headache or unusual drowsiness    Difficulty with speech or movement    Fever of 100.4 F (38 C) or higher, or as directed by your healthcare provider    9546-4192 [employer]. [address] [place] [address], [place] [address]. All rights reserved. This information is not intended as a substitute for professional medical care. Always follow your healthcare professional's instructions.        Treating Meniere s Disease: Lifestyle Changes    Certain changes may help you manage Meniere s disease. Some of these changes are minor. Others require more work. They include  avoiding certain substances. Special devices may also help make you more comfortable and improve your hearing.  Avoid Certain Substances  Certain substances affect how your body regulates fluid, and can make Meniere s disease worse. These include:    Caffeine. Caffeine narrows your blood vessels. This reduces blood flow to your inner ear. Avoid drinks and foods that are high in caffeine, such as coffee, cola, and chocolate.    Alcohol. Alcohol can upset your sense of balance. Avoid alcohol, or limit it to very small amounts.    Smoking. Tobacco smoke narrows your blood vessels, weakens your immune system, and harms your general health. By affecting your circulation, smoking may contribute to Meniere s symptoms. Quitting smoking is always a good idea.     A hearing aid can help improve your hearing.        A radio tuned to static may help block out bothersome tinnitus.   Explore Helpful Devices  If Meniere s disease has permanently affected your hearing, a hearing aid may help you hear better. Hearing aids come in many different models. You can find one that is best for your needs and lifestyle. Other devices can help cover up tinnitus. A fan or a radio tuned to music or static, or a white-noise device specifically designed to create background noise may help. A masking device that makes white noise all the time can be worn directly in the ear. Ask your health care provider if these devices are right for you.  Pay Attention to Your Body  People with Meniere s disease sometimes find that such things as bright lights, loud noises, or very low sounds bring on symptoms or make the symptoms worse. Pay attention to how you feel. If something makes you feel worse, talk with your health care provider.    3830-5597 [employer]. [address] [place] [address], [place] [address]. All rights reserved. This information is not intended as a substitute for professional medical care. Always follow your healthcare  "professional's instructions.              Follow-ups after your visit        Additional Services     OTOLARYNGOLOGY REFERRAL       Your provider has referred you to: [facility]: [facility] [phone]   [email or web address]    Please be aware that coverage of these services is subject to the terms and limitations of your health insurance plan.  Call member services at your health plan with any benefit or coverage questions.      Please bring the following with you to your appointment:    (1) Any X-Rays, CTs or MRIs which have been performed.  Contact the facility where they were done to arrange for  prior to your scheduled appointment.   (2) List of current medications  (3) This referral request   (4) Any documents/labs given to you for this referral                  Who to contact     If you have questions or need follow up information about today's clinic visit or your schedule please contact [facility] directly at [phone].  Normal or non-critical lab and imaging results will be communicated to you by [employer], letter or phone within 4 business days after the clinic has received the results. If you do not hear from us within 7 days, please contact the clinic through [employer] or phone. If you have a critical or abnormal lab result, we will notify you by phone as soon as possible.  Submit refill requests through [employer] or call your pharmacy and they will forward the refill request to us. Please allow 3 business days for your refill to be completed.          Additional Information About Your Visit        [employer] Information     [employer] lets you send messages to your doctor, view your test results, renew your prescriptions, schedule appointments and more. To sign up, go to [email or web address] . Click on \"Log in\" on the left side of the screen, which will take you to the Welcome page. Then click on \"Sign up Now\" on the right side of the page.     You " "will be asked to enter the access code listed below, as well as some personal information. Please follow the directions to create your username and password.     Your access code is: [email or web address]  Expires: 2017  3:41 PM     Your access code will  in 90 days. If you need help or a new code, please call your [facility] or [phone].        Care EveryWhere ID     This is your Care EveryWhere ID. This could be used by other organizations to access your [facility] medical records  [id number]        Your Vitals Were     Pulse Temperature Respirations Height BMI (Body Mass Index)       81 99.2  F (37.3  C) (Tympanic) 16 5' 3.39\" (1.61 m) 24.54 kg/m2        Blood Pressure from Last 3 Encounters:   17 131/84   16 158/90   16 137/78    Weight from Last 3 Encounters:   17 140 lb 4 oz (63.6 kg)   16 143 lb 6.4 oz (65 kg)   16 145 lb 3.2 oz (65.9 kg)              We Performed the Following     OTOLARYNGOLOGY REFERRAL     T4 FREE     TSH        Primary Care Provider    None Specified       No primary provider on file.        Thank you!     Thank you for choosing [facility]  for your care. Our goal is always to provide you with excellent care. Hearing back from our patients is one way we can continue to improve our services. Please take a few minutes to complete the written survey that you may receive in the mail after your visit with us. Thank you!             Your Updated Medication List - Protect others around you: Learn how to safely use, store and throw away your medicines at www.disposemymeds.org.          This list is accurate as of: 17  3:41 PM.  Always use your most recent med list.                   Brand Name Dispense Instructions for use    levothyroxine 112 MCG tablet    SYNTHROID/LEVOTHROID    90 tablet    Take 1 tablet (112 mcg) by mouth daily         "

## 2017-05-09 NOTE — NURSING NOTE
"Chief Complaint   Patient presents with     Dizziness     Symptom ongoing for two months.       Initial /84  Pulse 81  Temp 99.2  F (37.3  C) (Tympanic)  Resp 16  Ht 5' 3.39\" (1.61 m)  Wt 140 lb 4 oz (63.6 kg)  BMI 24.54 kg/m2 Estimated body mass index is 24.54 kg/(m^2) as calculated from the following:    Height as of this encounter: 5' 3.39\" (1.61 m).    Weight as of this encounter: 140 lb 4 oz (63.6 kg).    Medication Reconciliation:  complete    Eun Trujillo CMA (AAMA)  "

## 2017-05-12 DIAGNOSIS — E03.9 HYPOTHYROIDISM, UNSPECIFIED TYPE: ICD-10-CM

## 2017-05-12 RX ORDER — LEVOTHYROXINE SODIUM 100 UG/1
100 TABLET ORAL DAILY
Qty: 90 TABLET | Refills: 3 | Status: SHIPPED | OUTPATIENT
Start: 2017-05-12 | End: 2018-05-07

## 2017-05-16 ENCOUNTER — ALLIED HEALTH/NURSE VISIT (OUTPATIENT)
Dept: FAMILY MEDICINE | Facility: CLINIC | Age: 52
End: 2017-05-16
Payer: COMMERCIAL

## 2017-05-16 DIAGNOSIS — H61.20 IMPACTED CERUMEN: Primary | ICD-10-CM

## 2017-05-16 PROCEDURE — 99207 ZZC NO CHARGE NURSE ONLY: CPT

## 2017-05-16 NOTE — NURSING NOTE
ONSET:Per Bhavna Rosen NP  Patient was seen by provider on 5/9/17 and has cerumen impaction that was unable to be removed in clinic. She was sent home with drops and instructed to make MA only appointment for ear lavage after at least 4 days of drops at home. KATEY Feng CNP  LOCATION:  side:  Left    PAIN SEVERITY:  0/10    ADDITIONAL SYMPTOMS:  none    AFFECTS ON ADLs: none currently     HISTORY:  cerumen impaction    INTERVENTIONS TAKEN:  OTC medications Debrox     MEASURES WHICH RELIEVE SYMPTOMS: in clinic ear lavage.  Dallin BAILON RN looked into patient's left ear and did see wax. She said ok to flush.   Left Ear was flushed and wax was viewed coming out. Patient had no issues during procedure.   Jesi BRICE RN came into room and looked into left ear with nilo scope and said ear was free of wax and no redness. Patient was discharged. Dallin CASAS CMA

## 2017-05-16 NOTE — MR AVS SNAPSHOT
"              After Visit Summary   2017    Perri Scherer    MRN: [id number]           Patient Information     Date Of Birth          1965        Visit Information        Provider Department      2017 3:45 PM [place] FP/IM CMA/LPN [facility]        Today's Diagnoses     Impacted cerumen    -  1       Follow-ups after your visit        Who to contact     If you have questions or need follow up information about today's clinic visit or your schedule please contact [facility] directly at [phone].  Normal or non-critical lab and imaging results will be communicated to you by Arkadiumhart, letter or phone within 4 business days after the clinic has received the results. If you do not hear from us within 7 days, please contact the clinic through LeisureLinkt or phone. If you have a critical or abnormal lab result, we will notify you by phone as soon as possible.  Submit refill requests through YapStone or call your pharmacy and they will forward the refill request to us. Please allow 3 business days for your refill to be completed.          Additional Information About Your Visit        MyChart Information     YapStone lets you send messages to your doctor, view your test results, renew your prescriptions, schedule appointments and more. To sign up, go to [email or web address] . Click on \"Log in\" on the left side of the screen, which will take you to the Welcome page. Then click on \"Sign up Now\" on the right side of the page.     You will be asked to enter the access code listed below, as well as some personal information. Please follow the directions to create your username and password.     Your access code is: [email or web address]  Expires: 2017  3:41 PM     Your access code will  in 90 days. If you need help or a new code, please call your [facility] or [phone].        Care EveryWhere ID     This is your Care EveryWhere ID. This could be used by other organizations to " access your [place] medical records  [id number]         Blood Pressure from Last 3 Encounters:   05/09/17 131/84   09/12/16 158/90   04/25/16 137/78    Weight from Last 3 Encounters:   05/09/17 140 lb 4 oz (63.6 kg)   09/12/16 143 lb 6.4 oz (65 kg)   04/25/16 145 lb 3.2 oz (65.9 kg)              Today, you had the following     No orders found for display       Primary Care Provider    None Specified       No primary provider on file.        Thank you!     Thank you for choosing [facility]  for your care. Our goal is always to provide you with excellent care. Hearing back from our patients is one way we can continue to improve our services. Please take a few minutes to complete the written survey that you may receive in the mail after your visit with us. Thank you!             Your Updated Medication List - Protect others around you: Learn how to safely use, store and throw away your medicines at www.disposemymeds.org.          This list is accurate as of: 5/16/17  4:08 PM.  Always use your most recent med list.                   Brand Name Dispense Instructions for use    carbamide peroxide 6.5 % otic solution    DEBROX    30 mL    Place 5-10 drops Into the left ear 2 times daily       levothyroxine 100 MCG tablet    SYNTHROID/LEVOTHROID    90 tablet    Take 1 tablet (100 mcg) by mouth daily

## 2017-06-09 ENCOUNTER — OFFICE VISIT (OUTPATIENT)
Dept: LAB | Facility: SCHOOL | Age: 52
End: 2017-06-09
Payer: COMMERCIAL

## 2017-06-09 VITALS
DIASTOLIC BLOOD PRESSURE: 90 MMHG | SYSTOLIC BLOOD PRESSURE: 164 MMHG | HEART RATE: 90 BPM | OXYGEN SATURATION: 98 % | TEMPERATURE: 98.8 F

## 2017-06-09 DIAGNOSIS — J01.90 ACUTE SINUSITIS WITH SYMPTOMS > 10 DAYS: Primary | ICD-10-CM

## 2017-06-09 PROCEDURE — 99213 OFFICE O/P EST LOW 20 MIN: CPT | Performed by: NURSE PRACTITIONER

## 2017-06-09 NOTE — NURSING NOTE
"Chief Complaint   Patient presents with     URI       Initial /90 (BP Location: Right arm, Patient Position: Chair, Cuff Size: Adult Regular)  Pulse 90  Temp 98.8  F (37.1  C) (Tympanic)  SpO2 98% Estimated body mass index is 24.54 kg/(m^2) as calculated from the following:    Height as of 5/9/17: 5' 3.39\" (1.61 m).    Weight as of 5/9/17: 140 lb 4 oz (63.6 kg).  Medication Reconciliation: complete  "

## 2017-06-09 NOTE — MR AVS SNAPSHOT
After Visit Summary   6/9/2017    Perri Scherer    MRN: [id number]           Patient Information     Date Of Birth          1965        Visit Information        Provider Department      6/9/2017 12:00 PM Cathy Arvizu APRN [facility] 831        Today's Diagnoses     Acute sinusitis with symptoms > 10 days    -  1      Care Instructions    Thank you for using the [facility].  If there is no improvement of your condition, please call and schedule an appointment with your primary care provider.    The medication (s), dosing, route and duration was discussed with the patient.  In addition the drug monograph was reviewed and given to the patient for the medication (s).    Follow up with PCP regarding elevated blood pressure and other routine health screenings.  Continue all efforts at smoking cessation.                  Sinusitis           What is sinusitis?   Sinusitis is swollen, infected linings of the sinuses. The sinuses are hollow spaces in the bones of your face and skull. They connect with the nose through small openings. Like the nose, their linings make mucus.   How does it occur?   Sinusitis occurs when the sinus linings become infected. The passageways from the sinuses to the nose are very narrow. Swelling and mucus may block the passageways. This leads to pressure changes in the sinuses that can be painful.   A number of things can cause swelling and sinusitis. Most often it's allergens (things that cause allergies, like pollen and mold) and viruses, such as viruses that cause the common cold. Whether the cause is allergies or a virus, the sinus linings can swell. When swelling causes the sinus passageway to swell shut, bacteria, viruses, and even fungus can be trapped in the sinuses and cause a sinus infection.   If your nasal bones have been injured or are deformed, causing partial blockage of the sinus openings, you are more  likely to get sinusitis.   What are the symptoms?   Symptoms include:   feeling of fullness or pressure in your head   a headache that is most painful when you first wake up in the morning or when you bend your head down or forward   pain above or below your eyes   aching in the upper jaw and teeth   runny or stuffy nose   cough, especially at night   fluid draining down the back of your throat (postnasal drainage)   sore throat in the morning or evening.   How is it diagnosed?   Your healthcare provider will ask about your symptoms and will examine you. You may have an X-ray to look for swelling, fluid, or small benign growths (polyps) in the sinuses.   How is it treated?   Decongestants may help. They may be nonprescription or prescription. They are available as liquids, pills, and nose sprays.   Your healthcare provider may prescribe an antibiotic. In some cases you may need to take decongestants and antibiotics for several weeks.   You may need nonprescription medicine for pain, such as acetaminophen or ibuprofen. Check with your healthcare provider before you give any medicine that contains aspirin or salicylates to a child or teen. This includes medicines like baby aspirin, some cold medicines, and Pepto Bismol. Children and teens who take aspirin are at risk for a serious illness called Reye's syndrome. Ibuprofen is an NSAID. Nonsteroidal anti-inflammatory medicines (NSAIDs) may cause stomach bleeding and other problems. These risks increase with age. Read the label and take as directed. Unless recommended by your healthcare provider, do not take NSAIDs for more than 10 days for any reason.   If you have chronic or repeated sinus infections, allergies may be the cause. Your healthcare provider may prescribe antihistamine tablets or prescription nasal sprays (steroids or cromolyn) to treat the allergies.   If you have chronic, severe sinusitis that does not respond to treatment with medicines, surgery may be  done. The surgeon can create an extra or enlarged passageway in the wall of the sinus cavity. This allows the sinuses to drain more easily through the nasal passages. This should help them stay free of infection.   How long will the effects last?   Symptoms may get better gradually over 3 to 10 days. Depending on what caused the sinusitis and how severe it is, it may last for days or weeks. The symptoms may come back if you do not finish all of your antibiotic.   How can I take care of myself?   Follow your healthcare provider's instructions.   If you are taking an antibiotic, take all of it as directed by your provider. If you stop taking the medicine when your symptoms are gone but before you have taken all of the medicine, symptoms may come back.   Avoid tobacco smoke.   If you have allergies, take care to avoid the things you are allergic to, such as animal dander.   Add moisture to the air with a humidifier or a vaporizer, unless you have mold allergy (mold may grow in your vaporizer).   Inhale steam from a basin of hot water or shower to help open your sinuses and relieve pain.   Use saline nasal sprays to help wash out nasal passages and clear some mucus from the airways.   Use decongestants as directed on the label or by your provider.   If you are using a nonprescription nasal-spray decongestant, generally you should not use it for more than 3 days. After 3 days it may cause your symptoms to get worse. Ask your healthcare provider if it is OK for you to use a nasal spray decongestant longer than this.   Get plenty of rest.   Drink more fluids to keep the mucus as thin as possible so your sinuses can drain more easily.   Put warm compresses on painful areas.   Take antibiotics as prescribed. Use all of the medicine, even after you feel better. Some sinus infections require 2 to 4 weeks of antibiotic treatment.   See your healthcare provider if the pain lasts for several days or gets worse.   If the sinus  areas above or below your eyes are swollen or bulging, see your healthcare provider right away. This symptom may mean that the infection is spreading. A spreading infection can affect other parts of your body--even the brain--and needs to be treated promptly.   How can I help prevent sinusitis?   Treat your colds and allergies promptly. Use decongestants as soon as you start having symptoms.   Do not smoke and stay away from secondhand smoke.   Drink lots of fluids to keep the mucus thin.   Humidify your home if the air is particularly dry.   If you have sinus infections often, consider having allergy tests.   If sinusitis continues to be a problem despite treatment, you might need an exam by an ear, nose, and throat doctor (called an ENT or otolaryngologist). The specialist will check for polyps or a deformed bone that may be blocking your sinuses.     Published by [employer].  This content is reviewed periodically and is subject to change as new health information becomes available. The information is intended to inform and educate and is not a replacement for medical evaluation, advice, diagnosis or treatment by a healthcare professional.   Developed by [employer].   ? 2010 [employer] and/or its affiliates. All Rights Reserved.   Copyright   Clinical Reference Systems 2011  Adult Health Advisor              Follow-ups after your visit        Your next 10 appointments already scheduled     Jul 11, 2017  7:30 AM CDT   LAB with [place] [facility]   [facility]    [address] [address]   [phone]           Patient must bring picture ID.  Patient should be prepared to give a urine specimen  Please do not eat 10-12 hours before your appointment if you are coming in fasting for labs on lipids, cholesterol, or glucose (sugar).  Pregnant women should follow their Care Team instructions. Water with medications is okay. Do not drink coffee or other fluids.   If you  "have concerns about taking  your medications, please ask at office or if scheduling via EpiVax, send a message by clicking on Secure Messaging, Message Your Care Team.              Who to contact     If you have questions or need follow up information about today's clinic visit or your schedule please contact [facility] directly at [phone].  Normal or non-critical lab and imaging results will be communicated to you by MyChart, letter or phone within 4 business days after the clinic has received the results. If you do not hear from us within 7 days, please contact the clinic through 7signal Solutionshart or phone. If you have a critical or abnormal lab result, we will notify you by phone as soon as possible.  Submit refill requests through EpiVax or call your pharmacy and they will forward the refill request to us. Please allow 3 business days for your refill to be completed.          Additional Information About Your Visit        EpiVax Information     EpiVax lets you send messages to your doctor, view your test results, renew your prescriptions, schedule appointments and more. To sign up, go to [email or web address] . Click on \"Log in\" on the left side of the screen, which will take you to the Welcome page. Then click on \"Sign up Now\" on the right side of the page.     You will be asked to enter the access code listed below, as well as some personal information. Please follow the directions to create your username and password.     Your access code is: [email or web address]  Expires: 2017  3:41 PM     Your access code will  in 90 days. If you need help or a new code, please call your [facility] or [phone].        Care EveryWhere ID     This is your Care EveryWhere ID. This could be used by other organizations to access your [place] medical records  [id number]        Your Vitals Were     Pulse Temperature Pulse Oximetry             90 98.8  F (37.1  C) (Tympanic) 98%          " Blood Pressure from Last 3 Encounters:   06/09/17 164/90   05/09/17 131/84   09/12/16 158/90    Weight from Last 3 Encounters:   05/09/17 140 lb 4 oz (63.6 kg)   09/12/16 143 lb 6.4 oz (65 kg)   04/25/16 145 lb 3.2 oz (65.9 kg)              Today, you had the following     No orders found for display         Today's Medication Changes          These changes are accurate as of: 6/9/17 12:07 PM.  If you have any questions, ask your nurse or doctor.               Start taking these medicines.        Dose/Directions    amoxicillin-clavulanate 875-125 MG per tablet   Commonly known as:  AUGMENTIN   Used for:  Acute sinusitis with symptoms > 10 days   Started by:  Cathy Arvizu APRN CNP        Dose:  1 tablet   Take 1 tablet by mouth 2 times daily   Quantity:  20 tablet   Refills:  0            Where to get your medicines      Some of these will need a paper prescription and others can be bought over the counter.  Ask your nurse if you have questions.     Bring a paper prescription for each of these medications     amoxicillin-clavulanate 875-125 MG per tablet                Primary Care Provider    None Specified       No primary provider on file.        Thank you!     Thank you for choosing [place] [address]  for your care. Our goal is always to provide you with excellent care. Hearing back from our patients is one way we can continue to improve our services. Please take a few minutes to complete the written survey that you may receive in the mail after your visit with us. Thank you!             Your Updated Medication List - Protect others around you: Learn how to safely use, store and throw away your medicines at www.disposemymeds.org.          This list is accurate as of: 6/9/17 12:07 PM.  Always use your most recent med list.                   Brand Name Dispense Instructions for use    amoxicillin-clavulanate 875-125 MG per tablet    AUGMENTIN    20 tablet    Take 1 tablet by mouth 2 times  daily       levothyroxine 100 MCG tablet    SYNTHROID/LEVOTHROID    90 tablet    Take 1 tablet (100 mcg) by mouth daily

## 2017-06-09 NOTE — PATIENT INSTRUCTIONS
Thank you for using the [facility]  Clinic.  If there is no improvement of your condition, please call and schedule an appointment with your primary care provider.    The medication (s), dosing, route and duration was discussed with the patient.  In addition the drug monograph was reviewed and given to the patient for the medication (s).    Follow up with PCP regarding elevated blood pressure and other routine health screenings.  Continue all efforts at smoking cessation.                  Sinusitis           What is sinusitis?   Sinusitis is swollen, infected linings of the sinuses. The sinuses are hollow spaces in the bones of your face and skull. They connect with the nose through small openings. Like the nose, their linings make mucus.   How does it occur?   Sinusitis occurs when the sinus linings become infected. The passageways from the sinuses to the nose are very narrow. Swelling and mucus may block the passageways. This leads to pressure changes in the sinuses that can be painful.   A number of things can cause swelling and sinusitis. Most often it's allergens (things that cause allergies, like pollen and mold) and viruses, such as viruses that cause the common cold. Whether the cause is allergies or a virus, the sinus linings can swell. When swelling causes the sinus passageway to swell shut, bacteria, viruses, and even fungus can be trapped in the sinuses and cause a sinus infection.   If your nasal bones have been injured or are deformed, causing partial blockage of the sinus openings, you are more likely to get sinusitis.   What are the symptoms?   Symptoms include:   feeling of fullness or pressure in your head   a headache that is most painful when you first wake up in the morning or when you bend your head down or forward   pain above or below your eyes   aching in the upper jaw and teeth   runny or stuffy nose   cough, especially at night   fluid draining down the back of your throat  (postnasal drainage)   sore throat in the morning or evening.   How is it diagnosed?   Your healthcare provider will ask about your symptoms and will examine you. You may have an X-ray to look for swelling, fluid, or small benign growths (polyps) in the sinuses.   How is it treated?   Decongestants may help. They may be nonprescription or prescription. They are available as liquids, pills, and nose sprays.   Your healthcare provider may prescribe an antibiotic. In some cases you may need to take decongestants and antibiotics for several weeks.   You may need nonprescription medicine for pain, such as acetaminophen or ibuprofen. Check with your healthcare provider before you give any medicine that contains aspirin or salicylates to a child or teen. This includes medicines like baby aspirin, some cold medicines, and Pepto Bismol. Children and teens who take aspirin are at risk for a serious illness called Reye's syndrome. Ibuprofen is an NSAID. Nonsteroidal anti-inflammatory medicines (NSAIDs) may cause stomach bleeding and other problems. These risks increase with age. Read the label and take as directed. Unless recommended by your healthcare provider, do not take NSAIDs for more than 10 days for any reason.   If you have chronic or repeated sinus infections, allergies may be the cause. Your healthcare provider may prescribe antihistamine tablets or prescription nasal sprays (steroids or cromolyn) to treat the allergies.   If you have chronic, severe sinusitis that does not respond to treatment with medicines, surgery may be done. The surgeon can create an extra or enlarged passageway in the wall of the sinus cavity. This allows the sinuses to drain more easily through the nasal passages. This should help them stay free of infection.   How long will the effects last?   Symptoms may get better gradually over 3 to 10 days. Depending on what caused the sinusitis and how severe it is, it may last for days or weeks. The  symptoms may come back if you do not finish all of your antibiotic.   How can I take care of myself?   Follow your healthcare provider's instructions.   If you are taking an antibiotic, take all of it as directed by your provider. If you stop taking the medicine when your symptoms are gone but before you have taken all of the medicine, symptoms may come back.   Avoid tobacco smoke.   If you have allergies, take care to avoid the things you are allergic to, such as animal dander.   Add moisture to the air with a humidifier or a vaporizer, unless you have mold allergy (mold may grow in your vaporizer).   Inhale steam from a basin of hot water or shower to help open your sinuses and relieve pain.   Use saline nasal sprays to help wash out nasal passages and clear some mucus from the airways.   Use decongestants as directed on the label or by your provider.   If you are using a nonprescription nasal-spray decongestant, generally you should not use it for more than 3 days. After 3 days it may cause your symptoms to get worse. Ask your healthcare provider if it is OK for you to use a nasal spray decongestant longer than this.   Get plenty of rest.   Drink more fluids to keep the mucus as thin as possible so your sinuses can drain more easily.   Put warm compresses on painful areas.   Take antibiotics as prescribed. Use all of the medicine, even after you feel better. Some sinus infections require 2 to 4 weeks of antibiotic treatment.   See your healthcare provider if the pain lasts for several days or gets worse.   If the sinus areas above or below your eyes are swollen or bulging, see your healthcare provider right away. This symptom may mean that the infection is spreading. A spreading infection can affect other parts of your body--even the brain--and needs to be treated promptly.   How can I help prevent sinusitis?   Treat your colds and allergies promptly. Use decongestants as soon as you start having symptoms.   Do  not smoke and stay away from secondhand smoke.   Drink lots of fluids to keep the mucus thin.   Humidify your home if the air is particularly dry.   If you have sinus infections often, consider having allergy tests.   If sinusitis continues to be a problem despite treatment, you might need an exam by an ear, nose, and throat doctor (called an ENT or otolaryngologist). The specialist will check for polyps or a deformed bone that may be blocking your sinuses.     Published by [employer].  This content is reviewed periodically and is subject to change as new health information becomes available. The information is intended to inform and educate and is not a replacement for medical evaluation, advice, diagnosis or treatment by a healthcare professional.   Developed by [employer].   ? 2010 [employer] and/or its affiliates. All Rights Reserved.   Copyright   Clinical Reference Systems 2011  Adult Health Advisor

## 2017-06-09 NOTE — PROGRESS NOTES
SUBJECTIVE:                                                    Perri Scherer is a 51 year old female who presents to clinic today for the following health issues:      ENT Symptoms             Symptoms: cc Present Absent Comment   Fever/Chills    Hard to know with hot flashes   Fatigue  x     Muscle Aches  x     Eye Irritation  x     Sneezing  x     Nasal Chandrakant/Drg  x     Sinus Pressure/Pain  x     Loss of smell    unknown   Dental pain  x  yesterday   Sore Throat x x     Swollen Glands  x     Ear Pain/Fullness  x  Left ear on and off   Cough x x     Wheeze  x     Chest Pain   x    Shortness of breath  x  On and off   Rash   x    Other         Symptom duration:  6 weeks and getting worse   Symptom severity:  mod   Treatments tried:  not lately   Contacts:  school             Problem list and histories reviewed & adjusted, as indicated.  Additional history: she does get sinus infections on occasion.  She states her symptoms have come and gone for about 6 weeks but over the past couple of weeks it's worsened.  Worse is the sinus pain and congestion and the cough.  She does smoke and struggles with that.  Elevated blood pressure today. Looks like she's had that in the past but has never been worked up for that or been on medications.  She doesn't routinely seek medical attention. She is also due for routine health screenings. Doesn't know much about her [facility].    Patient Active Problem List   Diagnosis     Nicotine use disorder     Family history of diabetes mellitus     Hyperlipidemia LDL goal <160     Hypothyroidism     Past Surgical History:   Procedure Laterality Date     TUBAL LIGATION  1992       Social History   Substance Use Topics     Smoking status: Light Tobacco Smoker     Types: Cigarettes     Smokeless tobacco: Never Used      Comment: 1 cigarette very rarely,  otherwise E-cigs only     Alcohol use Yes      Comment: occasional     Family History   Problem Relation Age of Onset     DIABETES Mother       C.A.D. Father      Alzheimer Disease Maternal Grandfather          Current Outpatient Prescriptions   Medication Sig Dispense Refill     amoxicillin-clavulanate (AUGMENTIN) 875-125 MG per tablet Take 1 tablet by mouth 2 times daily 20 tablet 0     levothyroxine (SYNTHROID/LEVOTHROID) 100 MCG tablet Take 1 tablet (100 mcg) by mouth daily 90 tablet 3     No Known Allergies    Reviewed and updated as needed this visit by clinical staff  Allergies       Reviewed and updated as needed this visit by Provider          ROS: 10 point ROS neg other than the symptoms noted above in the HPI.    OBJECTIVE:                                                    /90  Pulse 90  Temp 98.8  F (37.1  C) (Tympanic)  SpO2 98%  There is no height or weight on file to calculate BMI.  GENERAL: healthy, alert and no distress  HENT: ear canals and TM's normal, pharynx with mild erythema, positive sinus tenderness throughout, voice is hoarse  NECK: no adenopathy, no asymmetry  RESP: lungs clear to auscultation - no rales, rhonchi or wheezes  CV: regular rate and rhythm  MS: no gross musculoskeletal defects noted      Diagnostic Test Results:  none      ASSESSMENT/PLAN:                                                            1. Acute sinusitis with symptoms > 10 days    - amoxicillin-clavulanate (AUGMENTIN) 875-125 MG per tablet; Take 1 tablet by mouth 2 times daily  Dispense: 20 tablet; Refill: 0    See Patient Instructions  Follow up if symptoms persist or worsen and as needed.    Patient Instructions   Thank you for using the [place] [address] [facility].  If there is no improvement of your condition, please call and schedule an appointment with your primary care provider.    The medication (s), dosing, route and duration was discussed with the patient.  In addition the drug monograph was reviewed and given to the patient for the medication (s).    Follow up with PCP regarding elevated blood pressure and other routine health  screenings.  Continue all efforts at smoking cessation.                  Sinusitis           What is sinusitis?   Sinusitis is swollen, infected linings of the sinuses. The sinuses are hollow spaces in the bones of your face and skull. They connect with the nose through small openings. Like the nose, their linings make mucus.   How does it occur?   Sinusitis occurs when the sinus linings become infected. The passageways from the sinuses to the nose are very narrow. Swelling and mucus may block the passageways. This leads to pressure changes in the sinuses that can be painful.   A number of things can cause swelling and sinusitis. Most often it's allergens (things that cause allergies, like pollen and mold) and viruses, such as viruses that cause the common cold. Whether the cause is allergies or a virus, the sinus linings can swell. When swelling causes the sinus passageway to swell shut, bacteria, viruses, and even fungus can be trapped in the sinuses and cause a sinus infection.   If your nasal bones have been injured or are deformed, causing partial blockage of the sinus openings, you are more likely to get sinusitis.   What are the symptoms?   Symptoms include:   feeling of fullness or pressure in your head   a headache that is most painful when you first wake up in the morning or when you bend your head down or forward   pain above or below your eyes   aching in the upper jaw and teeth   runny or stuffy nose   cough, especially at night   fluid draining down the back of your throat (postnasal drainage)   sore throat in the morning or evening.   How is it diagnosed?   Your healthcare provider will ask about your symptoms and will examine you. You may have an X-ray to look for swelling, fluid, or small benign growths (polyps) in the sinuses.   How is it treated?   Decongestants may help. They may be nonprescription or prescription. They are available as liquids, pills, and nose sprays.   Your healthcare provider  may prescribe an antibiotic. In some cases you may need to take decongestants and antibiotics for several weeks.   You may need nonprescription medicine for pain, such as acetaminophen or ibuprofen. Check with your healthcare provider before you give any medicine that contains aspirin or salicylates to a child or teen. This includes medicines like baby aspirin, some cold medicines, and Pepto Bismol. Children and teens who take aspirin are at risk for a serious illness called Reye's syndrome. Ibuprofen is an NSAID. Nonsteroidal anti-inflammatory medicines (NSAIDs) may cause stomach bleeding and other problems. These risks increase with age. Read the label and take as directed. Unless recommended by your healthcare provider, do not take NSAIDs for more than 10 days for any reason.   If you have chronic or repeated sinus infections, allergies may be the cause. Your healthcare provider may prescribe antihistamine tablets or prescription nasal sprays (steroids or cromolyn) to treat the allergies.   If you have chronic, severe sinusitis that does not respond to treatment with medicines, surgery may be done. The surgeon can create an extra or enlarged passageway in the wall of the sinus cavity. This allows the sinuses to drain more easily through the nasal passages. This should help them stay free of infection.   How long will the effects last?   Symptoms may get better gradually over 3 to 10 days. Depending on what caused the sinusitis and how severe it is, it may last for days or weeks. The symptoms may come back if you do not finish all of your antibiotic.   How can I take care of myself?   Follow your healthcare provider's instructions.   If you are taking an antibiotic, take all of it as directed by your provider. If you stop taking the medicine when your symptoms are gone but before you have taken all of the medicine, symptoms may come back.   Avoid tobacco smoke.   If you have allergies, take care to avoid the  things you are allergic to, such as animal dander.   Add moisture to the air with a humidifier or a vaporizer, unless you have mold allergy (mold may grow in your vaporizer).   Inhale steam from a basin of hot water or shower to help open your sinuses and relieve pain.   Use saline nasal sprays to help wash out nasal passages and clear some mucus from the airways.   Use decongestants as directed on the label or by your provider.   If you are using a nonprescription nasal-spray decongestant, generally you should not use it for more than 3 days. After 3 days it may cause your symptoms to get worse. Ask your healthcare provider if it is OK for you to use a nasal spray decongestant longer than this.   Get plenty of rest.   Drink more fluids to keep the mucus as thin as possible so your sinuses can drain more easily.   Put warm compresses on painful areas.   Take antibiotics as prescribed. Use all of the medicine, even after you feel better. Some sinus infections require 2 to 4 weeks of antibiotic treatment.   See your healthcare provider if the pain lasts for several days or gets worse.   If the sinus areas above or below your eyes are swollen or bulging, see your healthcare provider right away. This symptom may mean that the infection is spreading. A spreading infection can affect other parts of your body--even the brain--and needs to be treated promptly.   How can I help prevent sinusitis?   Treat your colds and allergies promptly. Use decongestants as soon as you start having symptoms.   Do not smoke and stay away from secondhand smoke.   Drink lots of fluids to keep the mucus thin.   Humidify your home if the air is particularly dry.   If you have sinus infections often, consider having allergy tests.   If sinusitis continues to be a problem despite treatment, you might need an exam by an ear, nose, and throat doctor (called an ENT or otolaryngologist). The specialist will check for polyps or a deformed bone that  may be blocking your sinuses.     Published by [employer].  This content is reviewed periodically and is subject to change as new health information becomes available. The information is intended to inform and educate and is not a replacement for medical evaluation, advice, diagnosis or treatment by a healthcare professional.   Developed by [employer].   ? 2010 [facility] and/or its affiliates. All Rights Reserved.   Copyright   [employer] 2011  Adult Health Advisor          KATEY Robbins [facility]

## 2017-07-11 ENCOUNTER — OFFICE VISIT (OUTPATIENT)
Dept: FAMILY MEDICINE | Facility: CLINIC | Age: 52
End: 2017-07-11
Payer: COMMERCIAL

## 2017-07-11 VITALS
OXYGEN SATURATION: 98 % | WEIGHT: 137 LBS | BODY MASS INDEX: 23.97 KG/M2 | HEART RATE: 77 BPM | DIASTOLIC BLOOD PRESSURE: 98 MMHG | SYSTOLIC BLOOD PRESSURE: 160 MMHG

## 2017-07-11 DIAGNOSIS — E03.8 OTHER SPECIFIED HYPOTHYROIDISM: ICD-10-CM

## 2017-07-11 DIAGNOSIS — Z12.11 SPECIAL SCREENING FOR MALIGNANT NEOPLASMS, COLON: Primary | ICD-10-CM

## 2017-07-11 DIAGNOSIS — E03.9 HYPOTHYROIDISM, UNSPECIFIED TYPE: ICD-10-CM

## 2017-07-11 DIAGNOSIS — Z87.891 PERSONAL HISTORY OF TOBACCO USE, PRESENTING HAZARDS TO HEALTH: ICD-10-CM

## 2017-07-11 DIAGNOSIS — I10 BENIGN ESSENTIAL HYPERTENSION: ICD-10-CM

## 2017-07-11 LAB
T4 FREE SERPL-MCNC: 1.21 NG/DL (ref 0.76–1.46)
TSH SERPL DL<=0.05 MIU/L-ACNC: 1.19 MU/L (ref 0.4–4)

## 2017-07-11 PROCEDURE — 99214 OFFICE O/P EST MOD 30 MIN: CPT | Performed by: NURSE PRACTITIONER

## 2017-07-11 PROCEDURE — 36415 COLL VENOUS BLD VENIPUNCTURE: CPT | Performed by: FAMILY MEDICINE

## 2017-07-11 PROCEDURE — 84443 ASSAY THYROID STIM HORMONE: CPT | Performed by: FAMILY MEDICINE

## 2017-07-11 PROCEDURE — 80048 BASIC METABOLIC PNL TOTAL CA: CPT | Performed by: NURSE PRACTITIONER

## 2017-07-11 PROCEDURE — 84439 ASSAY OF FREE THYROXINE: CPT | Performed by: FAMILY MEDICINE

## 2017-07-11 RX ORDER — LISINOPRIL 10 MG/1
10 TABLET ORAL DAILY
Qty: 30 TABLET | Refills: 3 | Status: SHIPPED | OUTPATIENT
Start: 2017-07-11 | End: 2017-11-07

## 2017-07-11 NOTE — LETTER
[facility]  [address] [address]  Phone: [phone]    July 12, 2017    Perri Schreer  [address] [place] [facility] [facility] [address]          Dear Ms. Scherer,    The results of your recent basic metabolic  lab tests were within normal limits.  Component      Latest Ref Rng & Units 7/11/2017   Sodium      133 - 144 mmol/L 139   Potassium      3.4 - 5.3 mmol/L 4.7   Chloride      94 - 109 mmol/L 107   Carbon Dioxide      20 - 32 mmol/L 25   Anion Gap      3 - 14 mmol/L 7   Glucose      70 - 99 mg/dL 83   Urea Nitrogen      7 - 30 mg/dL 9   Creatinine      0.52 - 1.04 mg/dL 0.62   GFR Estimate      >60 mL/min/1.7m2 >90 . . .   GFR Estimate If Black      >60 mL/min/1.7m2 >90 . . .   Calcium      8.5 - 10.1 mg/dL 8.9      If you have any further questions or problems, please contact our office.    Sincerely,      Kimmy Peter, NP / cb

## 2017-07-11 NOTE — PATIENT INSTRUCTIONS
1. Start Lisinopril - 1 tablet once a day in the morning  2. Schedule RN visit to recheck blood pressure       Controlling High Blood Pressure  High blood pressure (hypertension) is often called the silent killer. This is because many people who have it don t know it. High blood pressure is defined as 140/90 mm Hg or higher. Know your blood pressure and remember to check it regularly. Doing so can save your life. Here are some things you can do to help control your blood pressure.    Choose heart-healthy foods    Select low-salt, low-fat foods. Limit sodium intake to 2,400 mg per day or the amount suggested by your healthcare provider.    Limit canned, dried, cured, packaged, and fast foods. These can contain a lot of salt.    Eat 8 to 10 servings of fruits and vegetables every day.    Choose lean meats, fish, or chicken.    Eat whole-grain pasta, brown rice, and beans.    Eat 2 to 3 servings of low-fat or fat-free dairy products.    Ask your doctor about the DASH eating plan. This plan helps reduce blood pressure.    When you go to a restaurant, ask that your meal be prepared with no added salt.  Maintain a healthy weight    Ask your healthcare provider how many calories to eat a day. Then stick to that number.    Ask your healthcare provider what weight range is healthiest for you. If you are overweight, a weight loss of only 3% to 5% of your body weight can help lower blood pressure. Generally, a good weight loss goal is to lose 10% of your body weight in a year.    Limit snacks and sweets.    Get regular exercise.  Get up and get active    Choose activities you enjoy. Find ones you can do with friends or family. This includes bicycling, dancing, walking, and jogging.    Park farther away from building entrances.    Use stairs instead of the elevator.    When you can, walk or bike instead of driving.    [place] leaves, garden, or do household repairs.    Be active at a moderate to vigorous level of physical activity  for at least 40 minutes for a minimum of 3 to 4 days a week.   Manage stress    Make time to relax and enjoy life. Find time to laugh.    Communicate your concerns with your loved ones and your healthcare provider.    Visit with family and friends, and keep up with hobbies.  Limit alcohol and quit smoking    Men should have no more than 2 drinks per day.    Women should have no more than 1 drink per day.    Talk with your healthcare provider about quitting smoking. Smoking significantly increases your risk for heart disease and stroke. Ask your healthcare provider about community smoking cessation programs and other options.  Medicines  If lifestyle changes aren t enough, your healthcare provider may prescribe high blood pressure medicine. Take all medicines as prescribed. If you have any questions about your medicines, ask your healthcare provider before stopping or changing them.   Date Last Reviewed: 4/27/2016 2000-2017 [employer]. [address] [place] [address], [place] [address]. All rights reserved. This information is not intended as a substitute for professional medical care. Always follow your healthcare professional's instructions.

## 2017-07-11 NOTE — PROGRESS NOTES
SUBJECTIVE:                                                    Perri Scherer is a 51 year old female who presents to clinic today for the following health issues:      Hypothyroidism Follow-up    Since last visit, patient describes the following symptoms: Weight stable, no hair loss, no skin changes, no constipation, no loose stools    Amount of exercise or physical activity: None    Problems taking medications regularly: No    Medication side effects: none    Diet: regular (no restrictions)      Hx of elevated BP's and was suggested by last office visit provider that she be evaluated for HTN.  BP Readings from Last 3 Encounters:   07/11/17 (!) 152/95   06/09/17 164/90   05/09/17 131/84         Problem list and histories reviewed & adjusted, as indicated.  Additional history: as documented    Patient Active Problem List   Diagnosis     Nicotine use disorder     Family history of diabetes mellitus     Hyperlipidemia LDL goal <160     Hypothyroidism     Past Surgical History:   Procedure Laterality Date     TUBAL LIGATION  1992       Social History   Substance Use Topics     Smoking status: Light Tobacco Smoker     Types: Cigarettes     Smokeless tobacco: Never Used      Comment: 1 cigarette very rarely,  otherwise E-cigs only     Alcohol use Yes      Comment: occasional     Family History   Problem Relation Age of Onset     DIABETES Mother      C.A.D. Father      Alzheimer Disease Maternal Grandfather            Reviewed and updated as needed this visit by clinical staff       Reviewed and updated as needed this visit by Provider         ROS:  Constitutional, HEENT, cardiovascular, pulmonary, GI, , musculoskeletal, neuro, skin, endocrine and psych systems are negative, except as otherwise noted.    OBJECTIVE:     BP (!) 152/95 (BP Location: Left arm, Patient Position: Chair, Cuff Size: Adult Regular)  Pulse 77  Wt 137 lb (62.1 kg)  SpO2 98%  BMI 23.97 kg/m2  Body mass index is 23.97 kg/(m^2).  GENERAL:  healthy, alert and no distress  NECK: no adenopathy, no asymmetry, masses, or scars and thyroid normal to palpation  RESP: lungs clear to auscultation - no rales, rhonchi or wheezes  CV: regular rate and rhythm, normal S1 S2, no S3 or S4, no murmur, click or rub, no peripheral edema and peripheral pulses strong  MS: no gross musculoskeletal defects noted, no edema  PSYCH: mentation appears normal, affect normal/bright    Diagnostic Test Results:  none     ASSESSMENT/PLAN:         1. Benign essential hypertension  uncontrolled  - Basic metabolic panel  - start lisinopril (PRINIVIL/ZESTRIL) 10 MG tablet; Take 1 tablet (10 mg) by mouth daily  Dispense: 30 tablet; Refill: 3  - discussed quitting smoking/ causes hypertension  - discussed low sodium diet and start exercise  - return to clinic for RN to check blood pressure    2. Other specified hypothyroidism  TSH today- continue taking Levothyroxine 100 mcg daily     3. Special screening for malignant neoplasms, colon    - GASTROENTEROLOGY ADULT REF PROCEDURE ONLY    4. Personal history of tobacco use, presenting hazards to health  Patient plans to taper off of smoking/ cutting down  Not interested in medications to help cessation        Follow up in 3 months for blood pressure recheck    KATEY Fernandez [facility]

## 2017-07-11 NOTE — NURSING NOTE
"Initial BP (!) 152/95 (BP Location: Left arm, Patient Position: Chair, Cuff Size: Adult Regular)  Pulse 77  Wt 137 lb (62.1 kg)  SpO2 98%  BMI 23.97 kg/m2 Estimated body mass index is 23.97 kg/(m^2) as calculated from the following:    Height as of 5/9/17: 5' 3.39\" (1.61 m).    Weight as of this encounter: 137 lb (62.1 kg). .    Consuelo Ruby    "

## 2017-07-11 NOTE — MR AVS SNAPSHOT
After Visit Summary   7/11/2017    Perri Scherer    MRN: [id number]           Patient Information     Date Of Birth          1965        Visit Information        Provider Department      7/11/2017 7:40 AM Kimmy Peter APRN [facility]        Today's Diagnoses     Special screening for malignant neoplasms, colon    -  1    Other specified hypothyroidism        Personal history of tobacco use, presenting hazards to health        Benign essential hypertension          Care Instructions    1. Start Lisinopril - 1 tablet once a day in the morning  2. Schedule RN visit to recheck blood pressure       Controlling High Blood Pressure  High blood pressure (hypertension) is often called the silent killer. This is because many people who have it don t know it. High blood pressure is defined as 140/90 mm Hg or higher. Know your blood pressure and remember to check it regularly. Doing so can save your life. Here are some things you can do to help control your blood pressure.    Choose heart-healthy foods    Select low-salt, low-fat foods. Limit sodium intake to 2,400 mg per day or the amount suggested by your healthcare provider.    Limit canned, dried, cured, packaged, and fast foods. These can contain a lot of salt.    Eat 8 to 10 servings of fruits and vegetables every day.    Choose lean meats, fish, or chicken.    Eat whole-grain pasta, brown rice, and beans.    Eat 2 to 3 servings of low-fat or fat-free dairy products.    Ask your doctor about the DASH eating plan. This plan helps reduce blood pressure.    When you go to a restaurant, ask that your meal be prepared with no added salt.  Maintain a healthy weight    Ask your healthcare provider how many calories to eat a day. Then stick to that number.    Ask your healthcare provider what weight range is healthiest for you. If you are overweight, a weight loss of only 3% to 5% of your body weight can help lower blood pressure.  Generally, a good weight loss goal is to lose 10% of your body weight in a year.    Limit snacks and sweets.    Get regular exercise.  Get up and get active    Choose activities you enjoy. Find ones you can do with friends or family. This includes bicycling, dancing, walking, and jogging.    Park farther away from building entrances.    Use stairs instead of the elevator.    When you can, walk or bike instead of driving.    [place] leaves, garden, or do household repairs.    Be active at a moderate to vigorous level of physical activity for at least 40 minutes for a minimum of 3 to 4 days a week.   Manage stress    Make time to relax and enjoy life. Find time to laugh.    Communicate your concerns with your loved ones and your healthcare provider.    Visit with family and friends, and keep up with hobbies.  Limit alcohol and quit smoking    Men should have no more than 2 drinks per day.    Women should have no more than 1 drink per day.    Talk with your healthcare provider about quitting smoking. Smoking significantly increases your risk for heart disease and stroke. Ask your healthcare provider about community smoking cessation programs and other options.  Medicines  If lifestyle changes aren t enough, your healthcare provider may prescribe high blood pressure medicine. Take all medicines as prescribed. If you have any questions about your medicines, ask your healthcare provider before stopping or changing them.   Date Last Reviewed: 4/27/2016 2000-2017 [employer]. [address] [place] [address], [place] [address]. All rights reserved. This information is not intended as a substitute for professional medical care. Always follow your healthcare professional's instructions.                Follow-ups after your visit        Additional Services     GASTROENTEROLOGY ADULT REF PROCEDURE ONLY       Last Lab Result: Creatinine (mg/dL)       Date                     Value                 04/26/2016                "0.75             ----------  There is no height or weight on file to calculate BMI.     Needed:  No  Language:  English    Patient will be contacted to schedule procedure.     Please be aware that coverage of these services is subject to the terms and limitations of your health insurance plan.  Call member services at your health plan with any benefit or coverage questions.  Any procedures must be performed at a [facility] facility OR coordinated by your clinic's referral office.    Please bring the following with you to your appointment:    (1) Any X-Rays, CTs or MRIs which have been performed.  Contact the facility where they were done to arrange for  prior to your scheduled appointment.    (2) List of current medications   (3) This referral request   (4) Any documents/labs given to you for this referral                  Who to contact     If you have questions or need follow up information about today's clinic visit or your schedule please contact [facility] directly at [phone].  Normal or non-critical lab and imaging results will be communicated to you by MyChart, letter or phone within 4 business days after the clinic has received the results. If you do not hear from us within 7 days, please contact the clinic through [employer] or phone. If you have a critical or abnormal lab result, we will notify you by phone as soon as possible.  Submit refill requests through [employer] or call your pharmacy and they will forward the refill request to us. Please allow 3 business days for your refill to be completed.          Additional Information About Your Visit        [employer] Information     [employer] lets you send messages to your doctor, view your test results, renew your prescriptions, schedule appointments and more. To sign up, go to [email or web address] . Click on \"Log in\" on the left side of the screen, which will take you to the Welcome page. Then click on \"Sign up Now\" on the right " side of the page.     You will be asked to enter the access code listed below, as well as some personal information. Please follow the directions to create your username and password.     Your access code is: [email or web address]  Expires: 2017  3:41 PM     Your access code will  in 90 days. If you need help or a new code, please call your [facility] or [phone].        Care EveryWhere ID     This is your Care EveryWhere ID. This could be used by other organizations to access your [place] medical records  [id number]        Your Vitals Were     Pulse Pulse Oximetry BMI (Body Mass Index)             77 98% 23.97 kg/m2          Blood Pressure from Last 3 Encounters:   17 (!) 152/95   17 164/90   17 131/84    Weight from Last 3 Encounters:   17 137 lb (62.1 kg)   17 140 lb 4 oz (63.6 kg)   16 143 lb 6.4 oz (65 kg)              We Performed the Following     Basic metabolic panel     GASTROENTEROLOGY ADULT REF PROCEDURE ONLY          Today's Medication Changes          These changes are accurate as of: 17  8:08 AM.  If you have any questions, ask your nurse or doctor.               Start taking these medicines.        Dose/Directions    lisinopril 10 MG tablet   Commonly known as:  PRINIVIL/ZESTRIL   Used for:  Benign essential hypertension   Started by:  Kimmy Peter APRN CNP        Dose:  10 mg   Take 1 tablet (10 mg) by mouth daily   Quantity:  30 tablet   Refills:  3            Where to get your medicines      [facility] [address] AT [facility] OF [address] [place] [address]  [address] [facility] [address]     Phone:  [phone]     lisinopril 10 MG tablet                Primary Care Provider Office Phone # Fax #    KATEY Fernandez [phone]       [facility] [address] [place] [address]        Equal Access to Services     JEAN BECKMAN AH:  Hadii raul rick Sotomasali, wasilviada luqadaha, qaybta kasky morales, cynthia elizabethin hayaabijal serranocielo schmitz laalicebijal gus. So [facility] [phone].    ATENCIÓN: Si habla lis, tiene a david disposición servicios gratuitos de asistencia lingüística. Llame al [phone].    We comply with applicable federal civil rights laws and [place] laws. We do not discriminate on the basis of race, color, national origin, age, disability sex, sexual orientation or gender identity.            Thank you!     Thank you for choosing [facility]  for your care. Our goal is always to provide you with excellent care. Hearing back from our patients is one way we can continue to improve our services. Please take a few minutes to complete the written survey that you may receive in the mail after your visit with us. Thank you!             Your Updated Medication List - Protect others around you: Learn how to safely use, store and throw away your medicines at www.disposemymeds.org.          This list is accurate as of: 7/11/17  8:08 AM.  Always use your most recent med list.                   Brand Name Dispense Instructions for use Diagnosis    levothyroxine 100 MCG tablet    SYNTHROID/LEVOTHROID    90 tablet    Take 1 tablet (100 mcg) by mouth daily    Hypothyroidism, unspecified type       lisinopril 10 MG tablet    PRINIVIL/ZESTRIL    30 tablet    Take 1 tablet (10 mg) by mouth daily    Benign essential hypertension

## 2017-07-12 LAB
ANION GAP SERPL CALCULATED.3IONS-SCNC: 7 MMOL/L (ref 3–14)
BUN SERPL-MCNC: 9 MG/DL (ref 7–30)
CALCIUM SERPL-MCNC: 8.9 MG/DL (ref 8.5–10.1)
CHLORIDE SERPL-SCNC: 107 MMOL/L (ref 94–109)
CO2 SERPL-SCNC: 25 MMOL/L (ref 20–32)
CREAT SERPL-MCNC: 0.62 MG/DL (ref 0.52–1.04)
GFR SERPL CREATININE-BSD FRML MDRD: NORMAL ML/MIN/1.7M2
GLUCOSE SERPL-MCNC: 83 MG/DL (ref 70–99)
POTASSIUM SERPL-SCNC: 4.7 MMOL/L (ref 3.4–5.3)
SODIUM SERPL-SCNC: 139 MMOL/L (ref 133–144)

## 2017-07-14 ENCOUNTER — NURSE TRIAGE (OUTPATIENT)
Dept: NURSING | Facility: CLINIC | Age: 52
End: 2017-07-14

## 2017-07-17 ENCOUNTER — OFFICE VISIT (OUTPATIENT)
Dept: LAB | Facility: SCHOOL | Age: 52
End: 2017-07-17
Payer: COMMERCIAL

## 2017-07-17 VITALS — SYSTOLIC BLOOD PRESSURE: 142 MMHG | DIASTOLIC BLOOD PRESSURE: 82 MMHG | HEART RATE: 75 BPM

## 2017-07-17 DIAGNOSIS — I10 BENIGN ESSENTIAL HYPERTENSION: Primary | ICD-10-CM

## 2017-07-17 PROCEDURE — 99207 ZZC NO CHARGE NURSE ONLY: CPT | Performed by: NURSE PRACTITIONER

## 2017-07-17 NOTE — MR AVS SNAPSHOT
"              After Visit Summary   7/17/2017    Perri Scherer    MRN: [id number]           Patient Information     Date Of Birth          1965        Visit Information        Provider Department      7/17/2017 1:30 PM Bhavna Rosen APRN CNP [facility]         Today's Diagnoses     Benign essential hypertension    -  1       Follow-ups after your visit        Your next 10 appointments already scheduled     Jul 19, 2017  7:30 AM CDT   [facility] Visit with [facility] Provider   [facility]  ([facility] )    [address] [id number] [phone]            Jul 25, 2017  4:00 PM CDT   School Visit with [facility] Provider   [facility]  ([facility] )    [address] [id number] [phone]              Who to contact     If you have questions or need follow up information about today's clinic visit or your schedule please contact [facility] directly at [phone].  Normal or non-critical lab and imaging results will be communicated to you by MyChart, letter or phone within 4 business days after the clinic has received the results. If you do not hear from us within 7 days, please contact the clinic through [employer] or phone. If you have a critical or abnormal lab result, we will notify you by phone as soon as possible.  Submit refill requests through [employer] or call your pharmacy and they will forward the refill request to us. Please allow 3 business days for your refill to be completed.          Additional Information About Your Visit        MyChart Information     [employer] lets you send messages to your doctor, view your test results, renew your prescriptions, schedule appointments and more. To sign up, go to [email or web address] . Click on \"Log in\" on the left side of the screen, " "which will take you to the Welcome page. Then click on \"Sign up Now\" on the right side of the page.     You will be asked to enter the access code listed below, as well as some personal information. Please follow the directions to create your username and password.     Your access code is: [email or web address]  Expires: 2017  3:41 PM     Your access code will  in 90 days. If you need help or a new code, please call your [place] clinic or [phone].        Care EveryWhere ID     This is your Care EveryWhere ID. This could be used by other organizations to access your [place] medical records  [id number]        Your Vitals Were     Pulse                   75            Blood Pressure from Last 3 Encounters:   17 142/82   17 (!) 160/98   17 164/90    Weight from Last 3 Encounters:   17 137 lb (62.1 kg)   17 140 lb 4 oz (63.6 kg)   16 143 lb 6.4 oz (65 kg)              Today, you had the following     No orders found for display       Primary Care Provider Office Phone # Fax #    Kimmy Roldant, APRN [facility] [phone]       [address] [address]        Equal Access to Services     JEAN BECKMAN : Hadii aad ku hadasho Soomaali, waaxda luqadaha, qaybta kaalmada adeegyada, waxrobi lares haydelroy santana . So [facility] [phone].    ATENCIÓN: Si habla español, tiene a david disposición servicios gratuitos de asistencia lingüística. Llame al [phone].    We comply with applicable federal civil rights laws and [place] laws. We do not discriminate on the basis of race, color, national origin, age, disability sex, sexual orientation or gender identity.            Thank you!     Thank you for choosing [place] [address]  for your care. Our goal is always to provide you with excellent care. Hearing back from our patients is one way we can continue to improve our services. Please take a few minutes to complete the " written survey that you may receive in the mail after your visit with us. Thank you!             Your Updated Medication List - Protect others around you: Learn how to safely use, store and throw away your medicines at www.disposemymeds.org.          This list is accurate as of: 7/17/17  1:36 PM.  Always use your most recent med list.                   Brand Name Dispense Instructions for use Diagnosis    levothyroxine 100 MCG tablet    SYNTHROID/LEVOTHROID    90 tablet    Take 1 tablet (100 mcg) by mouth daily    Hypothyroidism, unspecified type       lisinopril 10 MG tablet    PRINIVIL/ZESTRIL    30 tablet    Take 1 tablet (10 mg) by mouth daily    Benign essential hypertension

## 2017-07-17 NOTE — NURSING NOTE
"Chief Complaint   Patient presents with     BP check       Initial /82 (BP Location: Right arm, Patient Position: Sitting)  Pulse 75 Estimated body mass index is 23.97 kg/(m^2) as calculated from the following:    Height as of 5/9/17: 5' 3.39\" (1.61 m).    Weight as of 7/11/17: 137 lb (62.1 kg).  Medication Reconciliation: unable or not appropriate to perform  "

## 2017-07-25 ENCOUNTER — OFFICE VISIT (OUTPATIENT)
Dept: LAB | Facility: SCHOOL | Age: 52
End: 2017-07-25
Payer: COMMERCIAL

## 2017-07-25 VITALS
OXYGEN SATURATION: 97 % | SYSTOLIC BLOOD PRESSURE: 120 MMHG | TEMPERATURE: 98.1 F | DIASTOLIC BLOOD PRESSURE: 70 MMHG | HEART RATE: 77 BPM

## 2017-07-25 DIAGNOSIS — I10 BENIGN ESSENTIAL HYPERTENSION: Primary | ICD-10-CM

## 2017-07-25 PROCEDURE — 99207 ZZC NO BILLABLE SERVICE THIS VISIT: CPT | Performed by: NURSE PRACTITIONER

## 2017-07-25 NOTE — PROGRESS NOTES
SUBJECTIVE:                                                    Perri Scherer is a 51 year old female who presents to clinic today for the following health issues:      PT IS HERE JUST TO HAVE HER BLOOD PRESSURE CHECKED AS SHE IS ON A NEW BLOOD PRESSURE MEDICATION.        Problem list and histories reviewed & adjusted, as indicated.  Additional history: no side effects of concern and is otherwise feeling well    BP Readings from Last 3 Encounters:   07/25/17 120/70   07/17/17 142/82   07/11/17 (!) 160/98    Wt Readings from Last 3 Encounters:   07/11/17 137 lb (62.1 kg)   05/09/17 140 lb 4 oz (63.6 kg)   09/12/16 143 lb 6.4 oz (65 kg)                      Reviewed and updated as needed this visit by clinical staffAllergies       Reviewed and updated as needed this visit by Provider           OBJECTIVE:     /70 (BP Location: Right arm, Patient Position: Chair, Cuff Size: Adult Regular)  Pulse 77  Temp 98.1  F (36.7  C) (Tympanic)  SpO2 97%  There is no height or weight on file to calculate BMI.          ASSESSMENT/PLAN:             1. Benign essential hypertension    Continue to monitor at home with goal of consistently <140/90 and return in 2 weeks to have it rechecked in clinic and keep follow up with PCP as instructed in 3 months.    See Patient Instructions  Patient Instructions   Thank you for using the [place] [address] Clinic.  If there is no improvement of your condition, please call and schedule an appointment with your primary care provider.        KATEY Robbins CNP  [place] [address]

## 2017-07-25 NOTE — PATIENT INSTRUCTIONS
Thank you for using the [facility] Clinic.  If there is no improvement of your condition, please call and schedule an appointment with your primary care provider.

## 2017-07-25 NOTE — MR AVS SNAPSHOT
"              After Visit Summary   7/25/2017    Perri Scherer    MRN: [id number]           Patient Information     Date Of Birth          1965        Visit Information        Provider Department      7/25/2017 4:00 PM Cathy Arvizu APRN CNP [facility]        Today's Diagnoses     Benign essential hypertension    -  1      Care Instructions    Thank you for using the [place] [address] Clinic.  If there is no improvement of your condition, please call and schedule an appointment with your primary care provider.            Follow-ups after your visit        Follow-up notes from your care team     Return in about 2 weeks (around 8/8/2017), or if symptoms worsen or fail to improve.      Who to contact     If you have questions or need follow up information about today's clinic visit or your schedule please contact [place] [address] directly at [phone].  Normal or non-critical lab and imaging results will be communicated to you by MyChart, letter or phone within 4 business days after the clinic has received the results. If you do not hear from us within 7 days, please contact the clinic through MyChart or phone. If you have a critical or abnormal lab result, we will notify you by phone as soon as possible.  Submit refill requests through [employer] or call your pharmacy and they will forward the refill request to us. Please allow 3 business days for your refill to be completed.          Additional Information About Your Visit        MyChart Information     [employer] lets you send messages to your doctor, view your test results, renew your prescriptions, schedule appointments and more. To sign up, go to [email or web address] . Click on \"Log in\" on the left side of the screen, which will take you to the Welcome page. Then click on \"Sign up Now\" on the right side of the page.     You will be asked to enter the access code listed below, as well as some " personal information. Please follow the directions to create your username and password.     Your access code is: [email or web address]  Expires: 2017  3:41 PM     Your access code will  in 90 days. If you need help or a new code, please call your [facility] or [phone].        Care EveryWhere ID     This is your Care EveryWhere ID. This could be used by other organizations to access your [place] medical records  [id number]        Your Vitals Were     Pulse Temperature Pulse Oximetry             77 98.1  F (36.7  C) (Tympanic) 97%          Blood Pressure from Last 3 Encounters:   17 120/70   17 142/82   17 (!) 160/98    Weight from Last 3 Encounters:   17 137 lb (62.1 kg)   17 140 lb 4 oz (63.6 kg)   16 143 lb 6.4 oz (65 kg)              Today, you had the following     No orders found for display       Primary Care Provider Office Phone # Fax #    Kimmy Roldant, APRN [facility] [phone]       [address] [address]        Equal Access to Services     JEAN BECKMAN : Hadii aad ku hadasho Soomaali, waaxda luqadaha, qaybta kaalmada adeegyada, waxay idiin hayyeimyn zuleyma santana . So [facility] [phone].    ATENCIÓN: Si habla español, tiene a david disposición servicios gratuitos de asistencia lingüística. Llame al [phone].    We comply with applicable federal civil rights laws and [place] laws. We do not discriminate on the basis of race, color, national origin, age, disability sex, sexual orientation or gender identity.            Thank you!     Thank you for choosing [place] [address]  for your care. Our goal is always to provide you with excellent care. Hearing back from our patients is one way we can continue to improve our services. Please take a few minutes to complete the written survey that you may receive in the mail after your visit with us. Thank you!             Your Updated Medication  List - Protect others around you: Learn how to safely use, store and throw away your medicines at www.disposemymeds.org.          This list is accurate as of: 7/25/17  4:11 PM.  Always use your most recent med list.                   Brand Name Dispense Instructions for use Diagnosis    levothyroxine 100 MCG tablet    SYNTHROID/LEVOTHROID    90 tablet    Take 1 tablet (100 mcg) by mouth daily    Hypothyroidism, unspecified type       lisinopril 10 MG tablet    PRINIVIL/ZESTRIL    30 tablet    Take 1 tablet (10 mg) by mouth daily    Benign essential hypertension

## 2017-07-25 NOTE — NURSING NOTE
"Chief Complaint   Patient presents with     Hypertension       Initial /70 (BP Location: Right arm, Patient Position: Chair, Cuff Size: Adult Regular)  Pulse 77  Temp 98.1  F (36.7  C) (Tympanic)  SpO2 97% Estimated body mass index is 23.97 kg/(m^2) as calculated from the following:    Height as of 5/9/17: 5' 3.39\" (1.61 m).    Weight as of 7/11/17: 137 lb (62.1 kg).  Medication Reconciliation: complete  "

## 2017-08-22 ENCOUNTER — OFFICE VISIT (OUTPATIENT)
Dept: LAB | Facility: SCHOOL | Age: 52
End: 2017-08-22
Payer: COMMERCIAL

## 2017-08-22 VITALS
SYSTOLIC BLOOD PRESSURE: 152 MMHG | TEMPERATURE: 98.1 F | DIASTOLIC BLOOD PRESSURE: 82 MMHG | RESPIRATION RATE: 16 BRPM | HEART RATE: 99 BPM | OXYGEN SATURATION: 98 %

## 2017-08-22 DIAGNOSIS — H60.332 ACUTE SWIMMER'S EAR OF LEFT SIDE: Primary | ICD-10-CM

## 2017-08-22 PROCEDURE — 99213 OFFICE O/P EST LOW 20 MIN: CPT | Performed by: NURSE PRACTITIONER

## 2017-08-22 RX ORDER — CIPROFLOXACIN AND DEXAMETHASONE 3; 1 MG/ML; MG/ML
4 SUSPENSION/ DROPS AURICULAR (OTIC) 2 TIMES DAILY
Qty: 7.5 ML | Refills: 0 | Status: SHIPPED | OUTPATIENT
Start: 2017-08-22 | End: 2017-08-29

## 2017-08-22 NOTE — PATIENT INSTRUCTIONS
Thank you for using the [facility]inic.  If there is no improvement of your condition, please call and schedule an appointment with your primary care provider.    The medication (s), dosing, route and duration was discussed with the patient.  In addition the drug monograph was reviewed and given to the patient for the medication (s).                  Ear: Swimmer's (Otitis Externa)        What is swimmer's ear?   Swimmer's ear is an infection of the skin lining the ear canal. This problem is most common among swimmers or children that spend a lot of time in water. If your child has swimmer's ear, he or she may have the following symptoms:  itchy and painful ear canals   pain when the ear is moved up and down   pain when the tab of the outer ear overlying the ear canal is pushed in   ear feels plugged up   slight amount of clear discharge at first (without treatment, the discharge becomes yellowish).  What is the cause?   Swimmer's ear occurs when your child's ears have been in the water for long periods of time. When water gets trapped in the ear canal the lining becomes damp, swollen, and prone to infection.  Children are more likely to get swimmer's ear from swimming in lake water, compared to swimming pools or the sea. During the hottest weeks of the summer, some lakes have high levels of bacteria. Narrow ear canals also increase the risk of swimmer's ear. Cotton swabs also contribute to the problem by causing wax buildup.  How long does it last?   With treatment, symptoms should be better in 3 days and cleared up in 7 days.  How can I take care of my child?   Antibiotic-steroid eardrops for severe swimmer's ear. (These require a prescription.)Your child needs the eardrops prescribed by your healthcare provider.Run the eardrops down the side of the ear canal's opening so that air isn't trapped under the drops. Move the earlobe back and forth to help the eardrops pass down. Continue using the  eardrops until all the symptoms are cleared up for 48 hours.Generally, your child should not swim until the symptoms are gone. If he is on a swim team, he may continue but should use the eardrops as a rinse after each swimming session. Continued swimming may cause a slower recovery but won't cause any serious problems.   White vinegar eardrops.For mild swimmer's ear, use half-strength white vinegar eardrops. Fill the ear canal with white vinegar diluted with an equal amount of water. After 5 minutes, remove it by turning the head to the side. Do this twice a day until the ear canal gets back to normal.   Pain relief.Use acetaminophen (Tylenol) or ibuprofen (Advil) for pain relief.  How can I help prevent swimmer's ear?  First, limit how many hours a day your child spends in the water. The key to prevention is keeping the ear canals dry when your child is not swimming. After swimming, get all water out of the ear canals by turning the head to the side and pulling the earlobe in different directions to help the water run out. Dry the opening to the ear canal carefully. If recurrences are a big problem, rinse your child's ear canals with rubbing alcohol each time he finishes swimming or bathing to help it dry and kill germs. Another helpful home remedy is a solution of half rubbing alcohol and half white vinegar. The vinegar restores the normal acid balance to the ear canal.  Ask your healthcare provider if your child should use ear plugs or a swimming cap.  Common mistakes  Do not put cotton swabs in ear canals. They increase earwax buildup. The earwax then traps water behind it and increases the risk of swimmer's ear.   Rubbing alcohol is helpful for preventing swimmer's ear but not for treating it because it stings an infected ear too much.  When should I call my child's healthcare provider?  Call IMMEDIATELY if:  The ear pain becomes severe.   Your child starts acting very sick.  Call during office hours if:  The  "ear symptoms are not cleared up in 7 days.   You have other concerns or questions.      Published by [employer].  This content is reviewed periodically and is subject to change as new health information becomes available. The information is intended to inform and educate and is not a replacement for medical evaluation, advice, diagnosis or treatment by a healthcare professional.  Written by DANILO Hart MD, author of \"Your Child's Health,\" [employer].    2011 [facility] and/or its affiliates. All rights reserved.               "

## 2017-08-22 NOTE — NURSING NOTE
"Chief Complaint   Patient presents with     URI       Initial /82 (BP Location: Right arm, Patient Position: Chair, Cuff Size: Adult Regular)  Pulse 99  Temp 98.1  F (36.7  C) (Tympanic)  Resp 16  SpO2 98% Estimated body mass index is 23.97 kg/(m^2) as calculated from the following:    Height as of 5/9/17: 5' 3.39\" (1.61 m).    Weight as of 7/11/17: 137 lb (62.1 kg).  Medication Reconciliation: complete  "

## 2017-08-22 NOTE — MR AVS SNAPSHOT
After Visit Summary   8/22/2017    Perri Scherer    MRN: [id number]           Patient Information     Date Of Birth          1965        Visit Information        Provider Department      8/22/2017 5:00 PM Cathy Arvizu APRN [facility]        Today's Diagnoses     Acute swimmer's ear of left side    -  1      Care Instructions    Thank you for using the [facility].  If there is no improvement of your condition, please call and schedule an appointment with your primary care provider.    The medication (s), dosing, route and duration was discussed with the patient.  In addition the drug monograph was reviewed and given to the patient for the medication (s).                  Ear: Swimmer's (Otitis Externa)        What is swimmer's ear?   Swimmer's ear is an infection of the skin lining the ear canal. This problem is most common among swimmers or children that spend a lot of time in water. If your child has swimmer's ear, he or she may have the following symptoms:  itchy and painful ear canals   pain when the ear is moved up and down   pain when the tab of the outer ear overlying the ear canal is pushed in   ear feels plugged up   slight amount of clear discharge at first (without treatment, the discharge becomes yellowish).  What is the cause?   Swimmer's ear occurs when your child's ears have been in the water for long periods of time. When water gets trapped in the ear canal the lining becomes damp, swollen, and prone to infection.  Children are more likely to get swimmer's ear from swimming in lake water, compared to swimming pools or the sea. During the hottest weeks of the summer, some lakes have high levels of bacteria. Narrow ear canals also increase the risk of swimmer's ear. Cotton swabs also contribute to the problem by causing wax buildup.  How long does it last?   With treatment, symptoms should be better in 3 days and cleared  up in 7 days.  How can I take care of my child?   Antibiotic-steroid eardrops for severe swimmer's ear. (These require a prescription.)Your child needs the eardrops prescribed by your healthcare provider.Run the eardrops down the side of the ear canal's opening so that air isn't trapped under the drops. Move the earlobe back and forth to help the eardrops pass down. Continue using the eardrops until all the symptoms are cleared up for 48 hours.Generally, your child should not swim until the symptoms are gone. If he is on a swim team, he may continue but should use the eardrops as a rinse after each swimming session. Continued swimming may cause a slower recovery but won't cause any serious problems.   White vinegar eardrops.For mild swimmer's ear, use half-strength white vinegar eardrops. Fill the ear canal with white vinegar diluted with an equal amount of water. After 5 minutes, remove it by turning the head to the side. Do this twice a day until the ear canal gets back to normal.   Pain relief.Use acetaminophen (Tylenol) or ibuprofen (Advil) for pain relief.  How can I help prevent swimmer's ear?  First, limit how many hours a day your child spends in the water. The key to prevention is keeping the ear canals dry when your child is not swimming. After swimming, get all water out of the ear canals by turning the head to the side and pulling the earlobe in different directions to help the water run out. Dry the opening to the ear canal carefully. If recurrences are a big problem, rinse your child's ear canals with rubbing alcohol each time he finishes swimming or bathing to help it dry and kill germs. Another helpful home remedy is a solution of half rubbing alcohol and half white vinegar. The vinegar restores the normal acid balance to the ear canal.  Ask your healthcare provider if your child should use ear plugs or a swimming cap.  Common mistakes  Do not put cotton swabs in ear canals. They increase earwax  "buildup. The earwax then traps water behind it and increases the risk of swimmer's ear.   Rubbing alcohol is helpful for preventing swimmer's ear but not for treating it because it stings an infected ear too much.  When should I call my child's healthcare provider?  Call IMMEDIATELY if:  The ear pain becomes severe.   Your child starts acting very sick.  Call during office hours if:  The ear symptoms are not cleared up in 7 days.   You have other concerns or questions.      Published by [employer].  This content is reviewed periodically and is subject to change as new health information becomes available. The information is intended to inform and educate and is not a replacement for medical evaluation, advice, diagnosis or treatment by a healthcare professional.  Written by DANILO Hart MD, author of \"Your Child's Health,\" [employer].    2011 [facility] and/or its affiliates. All rights reserved.                       Follow-ups after your visit        Who to contact     If you have questions or need follow up information about today's clinic visit or your schedule please contact [place] [address] directly at [phone].  Normal or non-critical lab and imaging results will be communicated to you by MyChart, letter or phone within 4 business days after the clinic has received the results. If you do not hear from us within 7 days, please contact the clinic through [employer] or phone. If you have a critical or abnormal lab result, we will notify you by phone as soon as possible.  Submit refill requests through [employer] or call your pharmacy and they will forward the refill request to us. Please allow 3 business days for your refill to be completed.          Additional Information About Your Visit        [employer] Information     [employer] lets you send messages to your doctor, view your test results, renew your prescriptions, schedule appointments and more. To sign up, go to [email or web address] " ". Click on \"Log in\" on the left side of the screen, which will take you to the Welcome page. Then click on \"Sign up Now\" on the right side of the page.     You will be asked to enter the access code listed below, as well as some personal information. Please follow the directions to create your username and password.     Your access code is: [email or web address]  Expires: 2017  5:05 PM     Your access code will  in 90 days. If you need help or a new code, please call your [facility] or [phone].        Care EveryWhere ID     This is your Care EveryWhere ID. This could be used by other organizations to access your [place] medical records  [id number]        Your Vitals Were     Pulse Temperature Respirations Pulse Oximetry          99 98.1  F (36.7  C) (Tympanic) 16 98%         Blood Pressure from Last 3 Encounters:   17 152/82   17 120/70   17 142/82    Weight from Last 3 Encounters:   17 137 lb (62.1 kg)   17 140 lb 4 oz (63.6 kg)   16 143 lb 6.4 oz (65 kg)              Today, you had the following     No orders found for display         Today's Medication Changes          These changes are accurate as of: 17  5:05 PM.  If you have any questions, ask your nurse or doctor.               Start taking these medicines.        Dose/Directions    ciprofloxacin-dexamethasone otic suspension   Commonly known as:  CIPRODEX   Used for:  Acute swimmer's ear of left side        Dose:  4 drop   Place 4 drops Into the left ear 2 times daily for 7 days   Quantity:  7.5 mL   Refills:  0            Where to get your medicines      Some of these will need a paper prescription and others can be bought over the counter.  Ask your nurse if you have questions.     Bring a paper prescription for each of these medications     ciprofloxacin-dexamethasone otic suspension                Primary Care Provider Office Phone # Fax #    Kimmy KATEY Ordonez [phone]    "    [address] [facility] [address]        Equal Access to Services     JEAN BECKMAN : Hadii aad ku hadanandahazel Bethea, wasilviasteven tello, renettaendy wilkinsonmargotsteven morales, cynthia guevaracampbelldarrick weber. So [facility] [phone].    ATENCIÓN: Si habla español, tiene a david disposición servicios gratuitos de asistencia lingüística. Llame al [phone].    We comply with applicable federal civil rights laws and [place] laws. We do not discriminate on the basis of race, color, national origin, age, disability sex, sexual orientation or gender identity.            Thank you!     Thank you for choosing [place] [address]  for your care. Our goal is always to provide you with excellent care. Hearing back from our patients is one way we can continue to improve our services. Please take a few minutes to complete the written survey that you may receive in the mail after your visit with us. Thank you!             Your Updated Medication List - Protect others around you: Learn how to safely use, store and throw away your medicines at www.disposemymeds.org.          This list is accurate as of: 8/22/17  5:05 PM.  Always use your most recent med list.                   Brand Name Dispense Instructions for use Diagnosis    ciprofloxacin-dexamethasone otic suspension    CIPRODEX    7.5 mL    Place 4 drops Into the left ear 2 times daily for 7 days    Acute swimmer's ear of left side       levothyroxine 100 MCG tablet    SYNTHROID/LEVOTHROID    90 tablet    Take 1 tablet (100 mcg) by mouth daily    Hypothyroidism, unspecified type       lisinopril 10 MG tablet    PRINIVIL/ZESTRIL    30 tablet    Take 1 tablet (10 mg) by mouth daily    Benign essential hypertension

## 2017-08-22 NOTE — PROGRESS NOTES
SUBJECTIVE:   Perri Scherer is a 51 year old female who presents to clinic today for the following health issues:      ENT Symptoms             Symptoms: cc Present Absent Comment   Fever/Chills  x     Fatigue   x    Muscle Aches   x    Eye Irritation  x     Sneezing  x     Nasal Chandrakant/Drg  x     Sinus Pressure/Pain  x     Loss of smell   x    Dental pain   x    Sore Throat  x     Swollen Glands   x    Ear Pain/Fullness x x  left   Cough  x     Wheeze   x    Chest Pain   x    Shortness of breath   x    Rash   x    Other   x      Symptom duration:  ear pain started this morning   Symptom severity:  mod   Treatments tried:  none   Contacts:  none               Problem list and histories reviewed & adjusted, as indicated.  Additional history: sudden onset of left ear pain last night and has persisted throughout today. No prior treatments tried.  She does not use Q tips she has not been swimming or traveled recently.      Patient Active Problem List   Diagnosis     Nicotine use disorder     Family history of diabetes mellitus     Hyperlipidemia LDL goal <160     Hypothyroidism     Benign essential hypertension     Past Surgical History:   Procedure Laterality Date     TUBAL LIGATION  1992       Social History   Substance Use Topics     Smoking status: Light Tobacco Smoker     Types: Cigarettes     Smokeless tobacco: Never Used      Comment: 1 cigarette very rarely,  otherwise E-cigs only     Alcohol use Yes      Comment: occasional     Family History   Problem Relation Age of Onset     DIABETES Mother      C.A.D. Father      Alzheimer Disease Maternal Grandfather          Current Outpatient Prescriptions   Medication Sig Dispense Refill     ciprofloxacin-dexamethasone (CIPRODEX) otic suspension Place 4 drops Into the left ear 2 times daily for 7 days 7.5 mL 0     lisinopril (PRINIVIL/ZESTRIL) 10 MG tablet Take 1 tablet (10 mg) by mouth daily 30 tablet 3     levothyroxine (SYNTHROID/LEVOTHROID) 100 MCG tablet Take 1  tablet (100 mcg) by mouth daily 90 tablet 3     No Known Allergies      Reviewed and updated as needed this visit by clinical staffAllergies       Reviewed and updated as needed this visit by Provider         10 point ROS of systems including Constitutional, Eyes, Respiratory, Cardiovascular, Gastroenterology, Genitourinary, Integumentary, Muscularskeletal, Psychiatric were all negative except for pertinent positives noted in my HPI.    OBJECTIVE:     /82  Pulse 99  Temp 98.1  F (36.7  C) (Tympanic)  Resp 16  SpO2 98%  There is no height or weight on file to calculate BMI.  GENERAL: healthy, alert and no distress  HENT: ear canals, right is normal, left is inflamed and swollen and TM's normal, pharynx without erythema, no sinus tenderness  NECK: no adenopathy, no asymmetry  RESP: lungs clear to auscultation - no rales, rhonchi or wheezes  CV: regular rate and rhythm  MS: no gross musculoskeletal defects noted      Diagnostic Test Results:  none     ASSESSMENT/PLAN:             1. Acute swimmer's ear of left side    - ciprofloxacin-dexamethasone (CIPRODEX) otic suspension; Place 4 drops Into the left ear 2 times daily for 7 days  Dispense: 7.5 mL; Refill: 0    She was also advised to follow up with PCP for blood pressure.    See Patient Instructions    Patient Instructions   Thank you for using the [place] [address] [facility].  If there is no improvement of your condition, please call and schedule an appointment with your primary care provider.    The medication (s), dosing, route and duration was discussed with the patient.  In addition the drug monograph was reviewed and given to the patient for the medication (s).                  Ear: Swimmer's (Otitis Externa)        What is swimmer's ear?   Swimmer's ear is an infection of the skin lining the ear canal. This problem is most common among swimmers or children that spend a lot of time in water. If your child has swimmer's ear, he or she may have  the following symptoms:  itchy and painful ear canals   pain when the ear is moved up and down   pain when the tab of the outer ear overlying the ear canal is pushed in   ear feels plugged up   slight amount of clear discharge at first (without treatment, the discharge becomes yellowish).  What is the cause?   Swimmer's ear occurs when your child's ears have been in the water for long periods of time. When water gets trapped in the ear canal the lining becomes damp, swollen, and prone to infection.  Children are more likely to get swimmer's ear from swimming in lake water, compared to swimming pools or the sea. During the hottest weeks of the summer, some lakes have high levels of bacteria. Narrow ear canals also increase the risk of swimmer's ear. Cotton swabs also contribute to the problem by causing wax buildup.  How long does it last?   With treatment, symptoms should be better in 3 days and cleared up in 7 days.  How can I take care of my child?   Antibiotic-steroid eardrops for severe swimmer's ear. (These require a prescription.)Your child needs the eardrops prescribed by your healthcare provider.Run the eardrops down the side of the ear canal's opening so that air isn't trapped under the drops. Move the earlobe back and forth to help the eardrops pass down. Continue using the eardrops until all the symptoms are cleared up for 48 hours.Generally, your child should not swim until the symptoms are gone. If he is on a swim team, he may continue but should use the eardrops as a rinse after each swimming session. Continued swimming may cause a slower recovery but won't cause any serious problems.   White vinegar eardrops.For mild swimmer's ear, use half-strength white vinegar eardrops. Fill the ear canal with white vinegar diluted with an equal amount of water. After 5 minutes, remove it by turning the head to the side. Do this twice a day until the ear canal gets back to normal.   Pain relief.Use acetaminophen  "(Tylenol) or ibuprofen (Advil) for pain relief.  How can I help prevent swimmer's ear?  First, limit how many hours a day your child spends in the water. The key to prevention is keeping the ear canals dry when your child is not swimming. After swimming, get all water out of the ear canals by turning the head to the side and pulling the earlobe in different directions to help the water run out. Dry the opening to the ear canal carefully. If recurrences are a big problem, rinse your child's ear canals with rubbing alcohol each time he finishes swimming or bathing to help it dry and kill germs. Another helpful home remedy is a solution of half rubbing alcohol and half white vinegar. The vinegar restores the normal acid balance to the ear canal.  Ask your healthcare provider if your child should use ear plugs or a swimming cap.  Common mistakes  Do not put cotton swabs in ear canals. They increase earwax buildup. The earwax then traps water behind it and increases the risk of swimmer's ear.   Rubbing alcohol is helpful for preventing swimmer's ear but not for treating it because it stings an infected ear too much.  When should I call my child's healthcare provider?  Call IMMEDIATELY if:  The ear pain becomes severe.   Your child starts acting very sick.  Call during office hours if:  The ear symptoms are not cleared up in 7 days.   You have other concerns or questions.      Published by [employer].  This content is reviewed periodically and is subject to change as new health information becomes available. The information is intended to inform and educate and is not a replacement for medical evaluation, advice, diagnosis or treatment by a healthcare professional.  Written by DANILO Hart MD, author of \"Your Child's Health,\" [employer].    2011 [employer] and/or its affiliates. All rights reserved.                   KATEY Robbins [facility]     "

## 2017-08-30 ENCOUNTER — NURSE TRIAGE (OUTPATIENT)
Dept: NURSING | Facility: CLINIC | Age: 52
End: 2017-08-30

## 2017-08-30 ENCOUNTER — TELEPHONE (OUTPATIENT)
Dept: LAB | Facility: SCHOOL | Age: 52
End: 2017-08-30

## 2017-08-30 NOTE — TELEPHONE ENCOUNTER
Patient states her Lt ear pain continues - worsened since LOV 8-22-17.  She used ear drops as prescribed.  Now has a sharp pain in Lt ear.  Difficulty hearing out of ear as well.  RN advised appt for worsening symptoms.  Scheduled.  Bessie CASAS RN

## 2017-08-30 NOTE — TELEPHONE ENCOUNTER
"Clinic Action Needed:Yes please call patient at work number [phone]  Reason for Call:  Patient calling reporting she was seen 8/22/17 for ear pain and continues to use ear drops as prescribed. Reporting symptoms have increased with new \"sharp pain\" intermittent in ear.   Unable to hear out of left ear. Afebrile. Pain waking during night. Patient is requesting prescription for antibiotic.      Reason for Disposition    Decreased hearing (or another adult says that the ear canal is completely blocked with discharge)    Additional Information    Negative: [1] Swimmer's ear suspected BUT [2] not taking antibiotics (ear drops or pills)    Negative: [1] Recently diagnosed with otitis media AND [2] currently taking oral antibiotics (pills)    Negative: [1] Stiff neck (unable to touch chin to chest) AND [2] fever    Negative: [1] Stiff neck (unable to touch chin to chest) AND [2] headache    Negative: Bony area of skull behind the ear is pink or swollen    Negative: Patient sounds very sick or weak to the triager    Negative: [1] SEVERE pain AND [2] not improved 2 hours after pain medicine    Negative: Fever > 100.5 F (38.1 C)    Negative: Outer ear (ear lobe) is red or swollen    Negative: [1] Stiff neck (unable to touch chin to chest) AND [2] no fever or headache    Negative: [1] Taking antibiotic (ear drops or pills) > 72 hours (3 days) AND [2] ear pain not improved or recurs    Protocols used: EAR - OTITIS EXTERNA FOLLOW-UP CALL-[facility]    "

## 2017-08-30 NOTE — TELEPHONE ENCOUNTER
"Clinic Action Needed:Yes please call patient at work number [phone]  Reason for Call:  Patient calling reporting she was seen 8/22/17 at [facility] for ear pain and continues to use ear drops as prescribed. Reporting symptoms have increased with new \"sharp pain\" intermittent in ear.   Unable to hear out of left ear. Afebrile. Pain waking during night. Patient is requesting prescription for antibiotic.   Routed to: Patient's PCP Nurse Pool/Kimmy Triana RN  [facility]          Reason for Disposition    Decreased hearing (or another adult says that the ear canal is completely blocked with discharge)    Additional Information    Negative: [1] Swimmer's ear suspected BUT [2] not taking antibiotics (ear drops or pills)    Negative: [1] Recently diagnosed with otitis media AND [2] currently taking oral antibiotics (pills)    Negative: [1] Stiff neck (unable to touch chin to chest) AND [2] fever    Negative: [1] Stiff neck (unable to touch chin to chest) AND [2] headache    Negative: Bony area of skull behind the ear is pink or swollen    Negative: Patient sounds very sick or weak to the triager    Negative: [1] SEVERE pain AND [2] not improved 2 hours after pain medicine    Negative: Fever > 100.5 F (38.1 C)    Negative: Outer ear (ear lobe) is red or swollen    Negative: [1] Stiff neck (unable to touch chin to chest) AND [2] no fever or headache    Negative: [1] Taking antibiotic (ear drops or pills) > 72 hours (3 days) AND [2] ear pain not improved or recurs    Protocols used: EAR - OTITIS EXTERNA FOLLOW-UP CALL-ADULT-    "

## 2017-08-31 ENCOUNTER — OFFICE VISIT (OUTPATIENT)
Dept: FAMILY MEDICINE | Facility: CLINIC | Age: 52
End: 2017-08-31
Payer: COMMERCIAL

## 2017-08-31 VITALS
BODY MASS INDEX: 23.12 KG/M2 | DIASTOLIC BLOOD PRESSURE: 76 MMHG | TEMPERATURE: 97 F | WEIGHT: 135.4 LBS | SYSTOLIC BLOOD PRESSURE: 117 MMHG | HEIGHT: 64 IN | HEART RATE: 69 BPM | OXYGEN SATURATION: 97 %

## 2017-08-31 DIAGNOSIS — Z12.11 SPECIAL SCREENING FOR MALIGNANT NEOPLASMS, COLON: ICD-10-CM

## 2017-08-31 DIAGNOSIS — F17.200 NICOTINE USE DISORDER: ICD-10-CM

## 2017-08-31 DIAGNOSIS — Z12.31 ENCOUNTER FOR SCREENING MAMMOGRAM FOR BREAST CANCER: ICD-10-CM

## 2017-08-31 DIAGNOSIS — H69.92 DYSFUNCTION OF EUSTACHIAN TUBE, LEFT: Primary | ICD-10-CM

## 2017-08-31 PROCEDURE — 99213 OFFICE O/P EST LOW 20 MIN: CPT | Performed by: FAMILY MEDICINE

## 2017-08-31 NOTE — MR AVS SNAPSHOT
After Visit Summary   8/31/2017    Perri Scherer    MRN: [id number]           Patient Information     Date Of Birth          1965        Visit Information        Provider Department      8/31/2017 7:00 AM Jeff Nair MD [facility]        Today's Diagnoses     Encounter for screening mammogram for breast cancer    -  1      Care Instructions    Eustachian tube (pressure equalizing tube) dysfunction  -Daily non-drowsy allergy medication (claritin or zyrtec) either in the morning or at night.  -Nasal saline rinses  -Afrin over-the-counter up to twice a day for up to three days max or the nose gets addicted to it.  -Daily nose allergy spray (flonase or nasacort try for a few months to prevent this nose/ear plugging feeling)      Thank you for choosing [facility].  You may be receiving a survey in the mail from [employer] [facility] regarding your visit today.  Please take a few minutes to complete and return the survey to let us know how we are doing.      If you have questions or concerns, please contact us via [employer] or you can contact your care team at [phone].    Our Clinic hours are:  Monday 6:40 am  to 7:00 pm  Tuesday -Friday 6:40 am to 5:00 pm    The [place] outpatient lab hours are:  Monday - Friday 6:10 am to 4:45 pm  Saturdays 7:00 am to 11:00 am  Appointments are required, call [phone]    If you have clinical questions after hours or would like to schedule an appointment,  call the clinic at [phone].          Follow-ups after your visit        Who to contact     If you have questions or need follow up information about today's clinic visit or your schedule please contact [facility] directly at [phone].  Normal or non-critical lab and imaging results will be communicated to you by MyChart, letter or phone within 4 business days after the clinic has received the results. If you do not hear from us within 7 days, please contact the clinic  "through [employer] or phone. If you have a critical or abnormal lab result, we will notify you by phone as soon as possible.  Submit refill requests through [employer] or call your pharmacy and they will forward the refill request to us. Please allow 3 business days for your refill to be completed.          Additional Information About Your Visit        [employer] Information     The [employer] Hub lets you send messages to your doctor, view your test results, renew your prescriptions, schedule appointments and more. To sign up, go to [email or web address] . Click on \"Log in\" on the left side of the screen, which will take you to the Welcome page. Then click on \"Sign up Now\" on the right side of the page.     You will be asked to enter the access code listed below, as well as some personal information. Please follow the directions to create your username and password.     Your access code is: [email or web address]  Expires: 2017  5:05 PM     Your access code will  in 90 days. If you need help or a new code, please call your [place] clinic or [phone].        Care EveryWhere ID     This is your Care EveryWhere ID. This could be used by other organizations to access your [place] medical records  [id number]        Your Vitals Were     Pulse Temperature Height Pulse Oximetry BMI (Body Mass Index)       69 97  F (36.1  C) (Tympanic) 5' 4\" (1.626 m) 97% 23.24 kg/m2        Blood Pressure from Last 3 Encounters:   17 117/76   17 152/82   17 120/70    Weight from Last 3 Encounters:   17 135 lb 6.4 oz (61.4 kg)   17 137 lb (62.1 kg)   17 140 lb 4 oz (63.6 kg)              Today, you had the following     No orders found for display       Primary Care Provider Office Phone # Fax #    Kimmy Schroeder KATEY [facility] [phone] [address] [facility] [address]        Equal Access to Services     JEAN BECKMAN AH: willa Morales, renettaendy gomes" cynthia moralescampbelldarrick la'aan ah. Shruthi [facility] [phone].    ATENCIÓN: Si habla lis, tiene a david disposición servicios gratuitos de asistencia lingüística. Tracy al [phone].    We comply with applicable federal civil rights laws and [place] laws. We do not discriminate on the basis of race, color, national origin, age, disability sex, sexual orientation or gender identity.            Thank you!     Thank you for choosing [facility]  for your care. Our goal is always to provide you with excellent care. Hearing back from our patients is one way we can continue to improve our services. Please take a few minutes to complete the written survey that you may receive in the mail after your visit with us. Thank you!             Your Updated Medication List - Protect others around you: Learn how to safely use, store and throw away your medicines at www.disposemymeds.org.          This list is accurate as of: 8/31/17  7:36 AM.  Always use your most recent med list.                   Brand Name Dispense Instructions for use Diagnosis    levothyroxine 100 MCG tablet    SYNTHROID/LEVOTHROID    90 tablet    Take 1 tablet (100 mcg) by mouth daily    Hypothyroidism, unspecified type       lisinopril 10 MG tablet    PRINIVIL/ZESTRIL    30 tablet    Take 1 tablet (10 mg) by mouth daily    Benign essential hypertension

## 2017-08-31 NOTE — NURSING NOTE
"Chief Complaint   Patient presents with     Ear Problem     left ear pain - was seen 8/22/17 dx swimmer ear; felt better for first couple of days after office visit but now it has gotten worse - plugged and painful - shooting pain       Initial /76  Pulse 69  Temp 97  F (36.1  C) (Tympanic)  Ht 5' 4\" (1.626 m)  Wt 135 lb 6.4 oz (61.4 kg)  SpO2 97%  BMI 23.24 kg/m2 Estimated body mass index is 23.24 kg/(m^2) as calculated from the following:    Height as of this encounter: 5' 4\" (1.626 m).    Weight as of this encounter: 135 lb 6.4 oz (61.4 kg).  Medication Reconciliation: complete  "

## 2017-08-31 NOTE — PROGRESS NOTES
"  SUBJECTIVE:   Perri Scherer is a 51 year old female who presents to clinic today for the following health issues:      ENT Symptoms             Symptoms: cc Present Absent Comment   Fever/Chills  x  chills   Fatigue   x    Muscle Aches   x    Eye Irritation   x    Sneezing  x     Nasal Chandrakant/Drg  x     Sinus Pressure/Pain   x    Loss of smell   x    Dental pain   x    Sore Throat  x  Little bit   Swollen Glands   x    Ear Pain/Fullness  x  Left ear pressure, plugging, crackling, pain at times that goes up head   Cough   x    Wheeze   x    Chest Pain   x    Shortness of breath   x    Rash   x    Other   x      Symptom duration:  8/22/17   Symptom severity:  moderate   Treatments tried:  ciprodex, seemed helpful for 2-3 days   Contacts:  NA       Smoking at night 5 per night  Ongoing sinus drainage and on and off congestion for years.    Problem list and histories reviewed & adjusted, as indicated.  Additional history: as documented    Reviewed and updated as needed this visit by clinical staffTobacco  Allergies  Med Hx  Surg Hx  Fam Hx  Soc Hx      Reviewed and updated as needed this visit by Provider         ROS:  Constitutional, HEENT, cardiovascular, pulmonary, gi and gu systems are negative, except as otherwise noted.      OBJECTIVE:   /76  Pulse 69  Temp 97  F (36.1  C) (Tympanic)  Ht 5' 4\" (1.626 m)  Wt 135 lb 6.4 oz (61.4 kg)  SpO2 97%  BMI 23.24 kg/m2  Body mass index is 23.24 kg/(m^2).  GENERAL: healthy, alert and no distress  EYES: Eyes grossly normal to inspection, PERRL and conjunctivae and sclerae normal  HENT: ear canals and TM's normal, nose and mouth without ulcers or lesions  NECK: no adenopathy, no asymmetry, masses, or scars and thyroid normal to palpation    Diagnostic Test Results:  none     ASSESSMENT/PLAN:       Perri was seen today for ear problem and health maintenance.    Diagnoses and all orders for this visit:    Dysfunction of eustachian tube, left: see patient " instructions    Encounter for screening mammogram for breast cancer  -     *MA Screening Digital Bilateral; Future    Special screening for malignant neoplasms, colon    Nicotine use disorder: desires to quit, encouraged to quit to help this sinus issue too.        Patient Instructions   Eustachian tube (pressure equalizing tube) dysfunction  -Daily non-drowsy allergy medication (claritin or zyrtec) either in the morning or at night.  -Nasal saline rinses  -Afrin over-the-counter up to twice a day for up to three days max or the nose gets addicted to it.  -Daily nose allergy spray (flonase or nasacort try for a few months to prevent this nose/ear plugging feeling)      Thank you for choosing [facility].  You may be receiving a survey in the mail from [employer] regarding your visit today.  Please take a few minutes to complete and return the survey to let us know how we are doing.      If you have questions or concerns, please contact us via [employer] or you can contact your care team at [phone].    Our Clinic hours are:  Monday 6:40 am  to 7:00 pm  Tuesday -Friday 6:40 am to 5:00 pm    The [place] outpatient lab hours are:  Monday - Friday 6:10 am to 4:45 pm  Saturdays 7:00 am to 11:00 am  Appointments are required, call [phone]    If you have clinical questions after hours or would like to schedule an appointment,  call the clinic at [phone].      Jeff Nair MD  [facility]

## 2017-08-31 NOTE — PATIENT INSTRUCTIONS
Eustachian tube (pressure equalizing tube) dysfunction  -Daily non-drowsy allergy medication (claritin or zyrtec) either in the morning or at night.  -Nasal saline rinses  -Afrin over-the-counter up to twice a day for up to three days max or the nose gets addicted to it.  -Daily nose allergy spray (flonase or nasacort try for a few months to prevent this nose/ear plugging feeling)      Thank you for choosing [facility].  You may be receiving a survey in the mail from [employer] regarding your visit today.  Please take a few minutes to complete and return the survey to let us know how we are doing.      If you have questions or concerns, please contact us via [employer] or you can contact your care team at [phone].    Our Clinic hours are:  Monday 6:40 am  to 7:00 pm  Tuesday -Friday 6:40 am to 5:00 pm    The [place] outpatient lab hours are:  Monday - Friday 6:10 am to 4:45 pm  Saturdays 7:00 am to 11:00 am  Appointments are required, call [phone]    If you have clinical questions after hours or would like to schedule an appointment,  call the clinic at [phone].

## 2017-10-11 ENCOUNTER — TELEPHONE (OUTPATIENT)
Dept: FAMILY MEDICINE | Facility: CLINIC | Age: 52
End: 2017-10-11

## 2017-10-11 NOTE — TELEPHONE ENCOUNTER
Panel Management Review      Patient has the following on her problem list: None      Composite cancer screening  Chart review shows that this patient is due/due soon for the following Mammogram and Colonoscopy  Summary:    Patient is due/failing the following:   COLONOSCOPY and MAMMOGRAM    Action needed:   Colon cancer screening and mammogram.    Type of outreach:    Phone, left message for patient to call back.     Questions for provider review:    None                                                                                                                                    Jazmín Selby MA

## 2017-11-07 DIAGNOSIS — I10 BENIGN ESSENTIAL HYPERTENSION: ICD-10-CM

## 2017-11-07 RX ORDER — LISINOPRIL 10 MG/1
TABLET ORAL
Qty: 30 TABLET | Refills: 3 | Status: SHIPPED | OUTPATIENT
Start: 2017-11-07 | End: 2018-03-16

## 2017-11-07 NOTE — TELEPHONE ENCOUNTER
BP Readings from Last 6 Encounters:   08/31/17 117/76   08/22/17 152/82   07/25/17 120/70   07/17/17 142/82   07/11/17 (!) 160/98   06/09/17 164/90

## 2017-11-15 ENCOUNTER — TELEPHONE (OUTPATIENT)
Dept: FAMILY MEDICINE | Facility: CLINIC | Age: 52
End: 2017-11-15

## 2018-01-31 ENCOUNTER — TELEPHONE (OUTPATIENT)
Dept: FAMILY MEDICINE | Facility: CLINIC | Age: 53
End: 2018-01-31

## 2018-01-31 NOTE — TELEPHONE ENCOUNTER
Panel Management Review      Patient has the following on her problem list:     Hypertension   Last three blood pressure readings:  BP Readings from Last 3 Encounters:   08/31/17 117/76   08/22/17 152/82   07/25/17 120/70     Blood pressure: Passed    HTN Guidelines:  Age 18-59 BP range:  Less than 140/90  Age 60-85 with Diabetes:  Less than 140/90  Age 60-85 without Diabetes:  less than 150/90      Composite cancer screening  Chart review shows that this patient is due/due soon for the following Pap Smear, Mammogram and Colonoscopy  Summary:    Patient is due/failing the following:   COLONOSCOPY, MAMMOGRAM, PAP and PHYSICAL    Action needed:   Patient needs office visit for pappe.    Type of outreach:    Phone, left message for patient to call back.     Questions for provider review:    None                                                                                                                                    Jazmín Selby MA

## 2018-03-16 DIAGNOSIS — I10 BENIGN ESSENTIAL HYPERTENSION: ICD-10-CM

## 2018-03-16 RX ORDER — LISINOPRIL 10 MG/1
TABLET ORAL
Qty: 90 TABLET | Refills: 0 | Status: SHIPPED | OUTPATIENT
Start: 2018-03-16 | End: 2018-04-26 | Stop reason: DRUGHIGH

## 2018-03-16 NOTE — TELEPHONE ENCOUNTER
"Requested Prescriptions   Pending Prescriptions Disp Refills     lisinopril (PRINIVIL/ZESTRIL) 10 MG tablet [Pharmacy Med Name: LISINOPRIL 10MG TABLETS]  Last Written Prescription Date:  11/07/17  Last Fill Quantity: 30,  # refills: 3   Last office visit: 8/31/2017 with prescribing provider:  08/31/17   Future Office Visit:     30 tablet 0     Sig: TAKE 1 TABLET(10 MG) BY MOUTH DAILY    ACE Inhibitors (Including Combos) Protocol Passed    3/16/2018  8:57 AM       Passed - Blood pressure under 140/90 in past 12 months    BP Readings from Last 3 Encounters:   08/31/17 117/76   08/22/17 152/82   07/25/17 120/70          Passed - Recent (12 mo) or future (30 days) visit within the authorizing provider's specialty    Patient had office visit in the last 12 months or has a visit in the next 30 days with authorizing provider or within the authorizing provider's specialty.  See \"Patient Info\" tab in inbasket, or \"Choose Columns\" in Meds & Orders section of the refill encounter.           Passed - Patient is age 18 or older       Passed - No active pregnancy on record       Passed - Normal serum creatinine on file in past 12 months    Recent Labs   Lab Test  07/11/17   0806   CR  0.62          Passed - Normal serum potassium on file in past 12 months    Recent Labs   Lab Test  07/11/17   0806   POTASSIUM  4.7          Passed - No positive pregnancy test in past 12 months          "

## 2018-03-25 ENCOUNTER — HEALTH MAINTENANCE LETTER (OUTPATIENT)
Age: 53
End: 2018-03-25

## 2018-04-09 ENCOUNTER — TELEPHONE (OUTPATIENT)
Dept: FAMILY MEDICINE | Facility: CLINIC | Age: 53
End: 2018-04-09

## 2018-04-09 NOTE — TELEPHONE ENCOUNTER
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  \"Oh , I am not going to do that, can't I just come to the clinic? \"  Advised that we are not able to assess as thoroughly or quickly as the ED and per our protocol/ guidelines, this warrants ED.   Advised at the very least to have another adult bring her to nearest ED. \"well, I don't know, I will see. \"  Again, reiterated with these sx , she deserves an eval in ED.   Jesi Breen RNC      "

## 2018-04-24 ENCOUNTER — TELEPHONE (OUTPATIENT)
Dept: FAMILY MEDICINE | Facility: CLINIC | Age: 53
End: 2018-04-24

## 2018-04-24 NOTE — TELEPHONE ENCOUNTER
Patient reports:  She feels like she is gaining weight not losing weight after joining a gym and exercising 4-5 times a week, she has not changed her diet  She has had itchy heals for the last few weeks  She believes she need to follow up on there thyroid and labs  Scheduled appt 4/26/18    Faiza CASAS Rn

## 2018-04-24 NOTE — TELEPHONE ENCOUNTER
Reason for Call:  Itching heels, feels like she is gaining weight    Detailed comments: patient is calling and stating that she feels she may need to get her Thyroid Level checked as she is having itching in her heels, which is what happens when her thyroid is off, she also feels like she is gaining weight, even though she is working out more at the gym. She is hoping we can put in a lab order for her Thyroid.    Phone Number Patient can be reached at: Work number on file:  [phone] (work)    Best Time: any    Can we leave a detailed message on this number? YES   Kiana Guillen  Clinic Station  Flex      Call taken on 4/24/2018 at 10:33 AM by Kiana Guillen

## 2018-04-26 ENCOUNTER — OFFICE VISIT (OUTPATIENT)
Dept: FAMILY MEDICINE | Facility: CLINIC | Age: 53
End: 2018-04-26
Payer: COMMERCIAL

## 2018-04-26 VITALS
TEMPERATURE: 97.4 F | SYSTOLIC BLOOD PRESSURE: 140 MMHG | DIASTOLIC BLOOD PRESSURE: 86 MMHG | HEIGHT: 64 IN | RESPIRATION RATE: 16 BRPM | BODY MASS INDEX: 23.9 KG/M2 | WEIGHT: 140 LBS | HEART RATE: 69 BPM

## 2018-04-26 DIAGNOSIS — G43.809 OTHER MIGRAINE WITHOUT STATUS MIGRAINOSUS, NOT INTRACTABLE: ICD-10-CM

## 2018-04-26 DIAGNOSIS — I10 BENIGN ESSENTIAL HYPERTENSION: Primary | ICD-10-CM

## 2018-04-26 DIAGNOSIS — E03.9 HYPOTHYROIDISM, UNSPECIFIED TYPE: ICD-10-CM

## 2018-04-26 LAB
ANION GAP SERPL CALCULATED.3IONS-SCNC: 7 MMOL/L (ref 3–14)
BUN SERPL-MCNC: 12 MG/DL (ref 7–30)
CALCIUM SERPL-MCNC: 9 MG/DL (ref 8.5–10.1)
CHLORIDE SERPL-SCNC: 106 MMOL/L (ref 94–109)
CO2 SERPL-SCNC: 25 MMOL/L (ref 20–32)
CREAT SERPL-MCNC: 0.78 MG/DL (ref 0.52–1.04)
GFR SERPL CREATININE-BSD FRML MDRD: 78 ML/MIN/1.7M2
GLUCOSE SERPL-MCNC: 90 MG/DL (ref 70–99)
POTASSIUM SERPL-SCNC: 4.3 MMOL/L (ref 3.4–5.3)
SODIUM SERPL-SCNC: 138 MMOL/L (ref 133–144)
TSH SERPL DL<=0.005 MIU/L-ACNC: 1.12 MU/L (ref 0.4–4)

## 2018-04-26 PROCEDURE — 84443 ASSAY THYROID STIM HORMONE: CPT | Performed by: NURSE PRACTITIONER

## 2018-04-26 PROCEDURE — 80048 BASIC METABOLIC PNL TOTAL CA: CPT | Performed by: NURSE PRACTITIONER

## 2018-04-26 PROCEDURE — 99214 OFFICE O/P EST MOD 30 MIN: CPT | Performed by: NURSE PRACTITIONER

## 2018-04-26 PROCEDURE — 36415 COLL VENOUS BLD VENIPUNCTURE: CPT | Performed by: NURSE PRACTITIONER

## 2018-04-26 RX ORDER — LISINOPRIL 20 MG/1
20 TABLET ORAL DAILY
Qty: 90 TABLET | Refills: 3 | Status: SHIPPED | OUTPATIENT
Start: 2018-04-26 | End: 2018-12-07

## 2018-04-26 RX ORDER — BUTALBITAL, ACETAMINOPHEN AND CAFFEINE 50; 325; 40 MG/1; MG/1; MG/1
1 TABLET ORAL EVERY 6 HOURS PRN
Qty: 15 TABLET | Refills: 0 | Status: SHIPPED | OUTPATIENT
Start: 2018-04-26

## 2018-04-26 ASSESSMENT — ANXIETY QUESTIONNAIRES
GAD7 TOTAL SCORE: 6
1. FEELING NERVOUS, ANXIOUS, OR ON EDGE: NOT AT ALL
7. FEELING AFRAID AS IF SOMETHING AWFUL MIGHT HAPPEN: SEVERAL DAYS
3. WORRYING TOO MUCH ABOUT DIFFERENT THINGS: NOT AT ALL
2. NOT BEING ABLE TO STOP OR CONTROL WORRYING: NOT AT ALL
5. BEING SO RESTLESS THAT IT IS HARD TO SIT STILL: SEVERAL DAYS
6. BECOMING EASILY ANNOYED OR IRRITABLE: MORE THAN HALF THE DAYS

## 2018-04-26 ASSESSMENT — PATIENT HEALTH QUESTIONNAIRE - PHQ9: 5. POOR APPETITE OR OVEREATING: MORE THAN HALF THE DAYS

## 2018-04-26 NOTE — MR AVS SNAPSHOT
After Visit Summary   4/26/2018    Perri Scherer    MRN: [id number]           Patient Information     Date Of Birth          1965        Visit Information        Provider Department      4/26/2018 7:20 AM Kimmy Peter APRN [facility]        Today's Diagnoses     Benign essential hypertension    -  1    Hypothyroidism, unspecified type        Other migraine without status migrainosus, not intractable          Care Instructions      1. Lab work today  2. Take 20 mg of Lisinopril you currently are on 10 mg   3. Take Fioricet for your migraines    Thank you for choosing [facility].  You may be receiving a survey in the mail from [employer] regarding your visit today.  Please take a few minutes to complete and return the survey to let us know how we are doing.      If you have questions or concerns, please contact us via [employer] or you can contact your care team at [phone].    Our Clinic hours are:  Monday 6:40 am  to 7:00 pm  Tuesday -Friday 6:40 am to 5:00 pm    The [place] outpatient lab hours are:  Monday - Friday 6:10 am to 4:45 pm  Saturdays 7:00 am to 11:00 am  Appointments are required, call [phone]    If you have clinical questions after hours or would like to schedule an appointment,  call the clinic at [phone].    Self-Care for Headaches  Most headaches aren't serious and can be relieved with self-care. But some headaches may be a sign of another health problem like eye trouble or high blood pressure. To find the best treatment, learn what kind of headaches you get. For tension headaches, self-care will usually help. To treat migraines, ask your healthcare provider for advice. It is also possible to get both tension and migraine headaches. Self-care involves relieving the pain and avoiding headache  triggers  if you can.    Ways to reduce pain and tension  Try these steps:    Apply a cold compress or ice pack to the pain site.    Drink fluids.  "If nausea makes it hard to drink, try sucking on ice.    Rest. Protect yourself from bright light and loud noises.    Calm your emotions by imagining a peaceful scene.    Massage tight neck, shoulder, and head muscles.    To relax muscles, soak in a hot bath or use a hot shower.  Use medicines  Aspirin or aspirin substitutes, such as ibuprofen and acetaminophen, can relieve headache. Remember: Never give aspirin to anyone 18 years old or younger because of the risk of developing Reye syndrome. Use pain medicines only when necessary.  Track your headaches  Keeping a headache diary can help you and your healthcare provider identify what's causing your headaches:    Note when each headache happens.    Identify your activities and the foods you've eaten 6 to 8 hours before the headache began.    Look for any trends or \"triggers.\"  Signs of tension headache  Any of the following can be signs:    Dull pain or feeling of pressure in a tight band around your head    Pain in your neck or shoulders    Headache without a definite beginning or end    Headache after an activity such as driving or working on a computer  Signs of migraine  Any of the following can be signs:    Throbbing pain on one or both sides of your head    Nausea or vomiting    Extreme sensitivity to light, sound, and smells    Bright spots, flashes, or other visual changes    Pain or nausea so severe that you can't continue your daily activities  Call your healthcare provider   If you have any of the following symptoms, contact your healthcare provider:    A headache that lingers after a recent injury or bump to the head.    A fever with a stiff neck or pain when you bend your head toward your chest.    A headache along with slurred speech, changes in your vision, or numbness or weakness in your arms or legs.    A headache for longer than 3 days.    Frequent headaches, especially in the morning.    Headaches with seizures     Seek immediate medical attention " "if you have a headache that you would call \"the worst headache you have ever had.\"   Date Last Reviewed: 10/4/2015    2231-1370 [employer]. [address] [place] [address], [place] [address]. All rights reserved. This information is not intended as a substitute for professional medical care. Always follow your healthcare professional's instructions.                Follow-ups after your visit        Your next 10 appointments already scheduled     May 04, 2018  9:30 AM CDT   School Visit with [facility] Provider   [facility]  ([facility] )    [address] [place] [address] [phone]              Who to contact     If you have questions or need follow up information about today's clinic visit or your schedule please contact [facility] directly at [phone].  Normal or non-critical lab and imaging results will be communicated to you by MyChart, letter or phone within 4 business days after the clinic has received the results. If you do not hear from us within 7 days, please contact the clinic through MyChart or phone. If you have a critical or abnormal lab result, we will notify you by phone as soon as possible.  Submit refill requests through [employer] or call your pharmacy and they will forward the refill request to us. Please allow 3 business days for your refill to be completed.          Additional Information About Your Visit        MyChart Information     [employer] lets you send messages to your doctor, view your test results, renew your prescriptions, schedule appointments and more. To sign up, go to [email or web address] . Click on \"Log in\" on the left side of the screen, which will take you to the Welcome page. Then click on \"Sign up Now\" on the right side of the page.     You will be asked to enter the access code listed below, as well as some personal information. Please follow the directions to create " "your username and password.     Your access code is: [email or web address]  Expires: 2018  7:51 AM     Your access code will  in 90 days. If you need help or a new code, please call your [place] clinic or [phone].        Care EveryWhere ID     This is your Care EveryWhere ID. This could be used by other organizations to access your [place] medical records  [id number]        Your Vitals Were     Pulse Temperature Respirations Height BMI (Body Mass Index)       69 97.4  F (36.3  C) (Tympanic) 16 5' 3.5\" (1.613 m) 24.41 kg/m2        Blood Pressure from Last 3 Encounters:   18 139/90   17 117/76   17 152/82    Weight from Last 3 Encounters:   18 140 lb (63.5 kg)   17 135 lb 6.4 oz (61.4 kg)   17 137 lb (62.1 kg)              We Performed the Following     Basic metabolic panel     TSH with free T4 reflex          Today's Medication Changes          These changes are accurate as of 18  7:51 AM.  If you have any questions, ask your nurse or doctor.               Start taking these medicines.        Dose/Directions    butalbital-acetaminophen-caffeine -40 MG per tablet   Commonly known as:  FIORICET/ESGIC   Used for:  Other migraine without status migrainosus, not intractable   Started by:  Kimmy Peter APRN CNP        Dose:  1 tablet   Take 1 tablet by mouth every 6 hours as needed   Quantity:  15 tablet   Refills:  0         These medicines have changed or have updated prescriptions.        Dose/Directions    lisinopril 20 MG tablet   Commonly known as:  PRINIVIL/ZESTRIL   This may have changed:  See the new instructions.   Used for:  Benign essential hypertension   Changed by:  Kimmy Peter APRN CNP        Dose:  20 mg   Take 1 tablet (20 mg) by mouth daily   Quantity:  90 tablet   Refills:  3            Where to get your medicines      [facility] [address] [place] [address] [address] [facility] [address] [facility] [address] [place]  [address] [facility] [address]     Phone:  [phone]     lisinopril 20 MG tablet         Some of these will need a paper prescription and others can be bought over the counter.  Ask your nurse if you have questions.     Bring a paper prescription for each of these medications     butalbital-acetaminophen-caffeine -40 MG per tablet                Primary Care Provider Office Phone # Fax #    KATEY Fernandez [facility] [phone] [address] [facility] [address]        Equal Access to Services     JEAN BECKMAN : Hadii aad ku hadasho Soomaali, waaxda luqadaha, qaybta kaalmada adeegyada, cynthia santana . So [facility] [phone].    ATENCIÓN: Si habla español, tiene a david disposición servicios gratuitos de asistencia lingüística. Llame al [phone].    We comply with applicable federal civil rights laws and [place] laws. We do not discriminate on the basis of race, color, national origin, age, disability, sex, sexual orientation, or gender identity.            Thank you!     Thank you for choosing [facility]  for your care. Our goal is always to provide you with excellent care. Hearing back from our patients is one way we can continue to improve our services. Please take a few minutes to complete the written survey that you may receive in the mail after your visit with us. Thank you!             Your Updated Medication List - Protect others around you: Learn how to safely use, store and throw away your medicines at www.disposemymeds.org.          This list is accurate as of 4/26/18  7:51 AM.  Always use your most recent med list.                   Brand Name Dispense Instructions for use Diagnosis    butalbital-acetaminophen-caffeine -40 MG per tablet    FIORICET/ESGIC    15 tablet    Take 1 tablet by mouth every 6 hours as needed    Other migraine without status migrainosus, not intractable       levothyroxine 100  MCG tablet    SYNTHROID/LEVOTHROID    90 tablet    Take 1 tablet (100 mcg) by mouth daily    Hypothyroidism, unspecified type       lisinopril 20 MG tablet    PRINIVIL/ZESTRIL    90 tablet    Take 1 tablet (20 mg) by mouth daily    Benign essential hypertension

## 2018-04-26 NOTE — PROGRESS NOTES
SUBJECTIVE:   Perri Scherer is a 52 year old female who presents to clinic today for the following health issues:      Hypothyroidism Follow-up      Since last visit, patient describes the following symptoms: weight is stable - started working out in Jan, feels she should be losing some weight, feet itching, depression symptoms.        Amount of exercise or physical activity: at least 4 days per week    Problems taking medications regularly: No    Medication side effects: none    Diet: regular (no restrictions)          Migraine Follow-Up  (diagnosed in 1982, has not seen anyone for a long time for this)  No current rx meds for migraines    Headaches symptoms:  Sporatic, worse in spring and fall with the change of the seasons    Frequency: started yesterday, last one was in the fall - right sided headache- denies vision changes, occasional nausea and vomiting. + sensitive to light /noise.     Duration of headaches: 2-4 days    Able to do normal daily activities/work with migraines: Yes, she tries to do her normal activities    Rescue/Relief medication:ibuprofen (Advil, Motrin)              Effectiveness: minor relief    Preventative medication: None    Neurologic complications: No new stroke-like symptoms, loss of vision or speech, numbness or weakness    In the past 4 weeks, how often have you gone to Urgent Care or the emergency room because of your headaches?  0    Hypertension:  BP Readings from Last 3 Encounters:   04/26/18 140/86   08/31/17 117/76   08/22/17 152/82           -------------------------------------    Problem list and histories reviewed & adjusted, as indicated.  Additional history: as documented    Patient Active Problem List   Diagnosis     Nicotine use disorder     Family history of diabetes mellitus     Hyperlipidemia LDL goal <160     Hypothyroidism     Benign essential hypertension     Past Surgical History:   Procedure Laterality Date     TUBAL LIGATION  1992       Social History  "  Substance Use Topics     Smoking status: Former Smoker     Types: Cigarettes     Quit date: 11/1/2017     Smokeless tobacco: Never Used      Comment:   otherwise E-cigs only     Alcohol use Yes      Comment: occasional     Family History   Problem Relation Age of Onset     DIABETES Mother      C.A.D. Father      Alzheimer Disease Maternal Grandfather            Reviewed and updated as needed this visit by clinical staff       Reviewed and updated as needed this visit by Provider         ROS:  Constitutional, HEENT, cardiovascular, pulmonary, GI, , musculoskeletal, neuro, skin, endocrine and psych systems are negative, except as otherwise noted.    OBJECTIVE:     /86  Pulse 69  Temp 97.4  F (36.3  C) (Tympanic)  Resp 16  Ht 5' 3.5\" (1.613 m)  Wt 140 lb (63.5 kg)  BMI 24.41 kg/m2  Body mass index is 24.41 kg/(m^2).  GENERAL: healthy, alert and no distress  NECK: no adenopathy, no asymmetry, masses, or scars and thyroid normal to palpation  RESP: lungs clear to auscultation - no rales, rhonchi or wheezes  CV: regular rate and rhythm, normal S1 S2, no S3 or S4, no murmur, click or rub, no peripheral edema and peripheral pulses strong  MS: no gross musculoskeletal defects noted, no edema    Diagnostic Test Results:  none     ASSESSMENT/PLAN:       1. Hypothyroidism, unspecified type  Tolerating Levothyroxine   - TSH with free T4 reflex    2. Benign essential hypertension  Uncontrolled- will increase Lisinopril from 10 mg to 20 mg   - Basic metabolic panel  - lisinopril (PRINIVIL/ZESTRIL) 20 MG tablet; Take 1 tablet (20 mg) by mouth daily  Dispense: 90 tablet; Refill: 3  - Schedule RN visit next week to recheck blood pressure     3. Other migraine without status migrainosus, not intractable    - butalbital-acetaminophen-caffeine (FIORICET/ESGIC) -40 MG per tablet; Take 1 tablet by mouth every 6 hours as needed  Dispense: 15 tablet; Refill: 0    Patient Instructions       1. Lab work today  2. Take 20 " mg of Lisinopril you currently are on 10 mg   3. Take Fioricet for your migraines    Thank you for choosing [facility].  You may be receiving a survey in the mail from [facility] regarding your visit today.  Please take a few minutes to complete and return the survey to let us know how we are doing.      If you have questions or concerns, please contact us via [employer] or you can contact your care team at [phone].    Our Clinic hours are:  Monday 6:40 am  to 7:00 pm  Tuesday -Friday 6:40 am to 5:00 pm    The [place] outpatient lab hours are:  Monday - Friday 6:10 am to 4:45 pm  Saturdays 7:00 am to 11:00 am  Appointments are required, call [phone]    If you have clinical questions after hours or would like to schedule an appointment,  call the clinic at [phone].    Self-Care for Headaches  Most headaches aren't serious and can be relieved with self-care. But some headaches may be a sign of another health problem like eye trouble or high blood pressure. To find the best treatment, learn what kind of headaches you get. For tension headaches, self-care will usually help. To treat migraines, ask your healthcare provider for advice. It is also possible to get both tension and migraine headaches. Self-care involves relieving the pain and avoiding headache  triggers  if you can.    Ways to reduce pain and tension  Try these steps:    Apply a cold compress or ice pack to the pain site.    Drink fluids. If nausea makes it hard to drink, try sucking on ice.    Rest. Protect yourself from bright light and loud noises.    Calm your emotions by imagining a peaceful scene.    Massage tight neck, shoulder, and head muscles.    To relax muscles, soak in a hot bath or use a hot shower.  Use medicines  Aspirin or aspirin substitutes, such as ibuprofen and acetaminophen, can relieve headache. Remember: Never give aspirin to anyone 18 years old or younger because of the risk of developing Reye syndrome. Use pain  "medicines only when necessary.  Track your headaches  Keeping a headache diary can help you and your healthcare provider identify what's causing your headaches:    Note when each headache happens.    Identify your activities and the foods you've eaten 6 to 8 hours before the headache began.    Look for any trends or \"triggers.\"  Signs of tension headache  Any of the following can be signs:    Dull pain or feeling of pressure in a tight band around your head    Pain in your neck or shoulders    Headache without a definite beginning or end    Headache after an activity such as driving or working on a computer  Signs of migraine  Any of the following can be signs:    Throbbing pain on one or both sides of your head    Nausea or vomiting    Extreme sensitivity to light, sound, and smells    Bright spots, flashes, or other visual changes    Pain or nausea so severe that you can't continue your daily activities  Call your healthcare provider   If you have any of the following symptoms, contact your healthcare provider:    A headache that lingers after a recent injury or bump to the head.    A fever with a stiff neck or pain when you bend your head toward your chest.    A headache along with slurred speech, changes in your vision, or numbness or weakness in your arms or legs.    A headache for longer than 3 days.    Frequent headaches, especially in the morning.    Headaches with seizures     Seek immediate medical attention if you have a headache that you would call \"the worst headache you have ever had.\"   Date Last Reviewed: 10/4/2015    0722-1227 [employer]. [address] [place] [address], [place] [address]. All rights reserved. This information is not intended as a substitute for professional medical care. Always follow your healthcare professional's instructions.            KATEY Fernandez [facility]    "

## 2018-04-26 NOTE — LETTER
April 26, 2018      Perri Scherer  [address] [facility] [address]        Dear ,    We are writing to inform you of your test results.    Your test results fall within the expected range(s) .    Resulted Orders   Basic metabolic panel   Result Value Ref Range    Sodium 138 133 - 144 mmol/L    Potassium 4.3 3.4 - 5.3 mmol/L    Chloride 106 94 - 109 mmol/L    Carbon Dioxide 25 20 - 32 mmol/L    Anion Gap 7 3 - 14 mmol/L    Glucose 90 70 - 99 mg/dL      Comment:      Non Fasting    Urea Nitrogen 12 7 - 30 mg/dL    Creatinine 0.78 0.52 - 1.04 mg/dL    GFR Estimate 78 >60 mL/min/1.7m2      Comment:      Non  GFR Calc    GFR Estimate If Black >90 >60 mL/min/1.7m2      Comment:       GFR Calc    Calcium 9.0 8.5 - 10.1 mg/dL   TSH with free T4 reflex   Result Value Ref Range    TSH 1.12 0.40 - 4.00 mU/L   If you have any questions or concerns, please call the clinic at the number listed above.       Sincerely,        Kimmy Peter, APRN CNP/cb

## 2018-04-26 NOTE — NURSING NOTE
"Chief Complaint   Patient presents with     Thyroid Problem     Recheck, patient thinks her thyroid is off.     Headache     Migraines, would like to discuss treatment options.       Initial /90 (BP Location: Left arm, Patient Position: Chair, Cuff Size: Adult Regular)  Pulse 69  Temp 97.4  F (36.3  C) (Tympanic)  Resp 16  Ht 5' 3.5\" (1.613 m)  Wt 140 lb (63.5 kg)  BMI 24.41 kg/m2 Estimated body mass index is 24.41 kg/(m^2) as calculated from the following:    Height as of this encounter: 5' 3.5\" (1.613 m).    Weight as of this encounter: 140 lb (63.5 kg).  Medication Reconciliation: complete  "

## 2018-04-26 NOTE — PATIENT INSTRUCTIONS
1. Lab work today  2. Take 20 mg of Lisinopril you currently are on 10 mg   3. Take Fioricet for your migraines    Thank you for choosing [facility].  You may be receiving a survey in the mail from [facility] regarding your visit today.  Please take a few minutes to complete and return the survey to let us know how we are doing.      If you have questions or concerns, please contact us via [employer] or you can contact your care team at [phone].    Our Clinic hours are:  Monday 6:40 am  to 7:00 pm  Tuesday -Friday 6:40 am to 5:00 pm    The [place] outpatient lab hours are:  Monday - Friday 6:10 am to 4:45 pm  Saturdays 7:00 am to 11:00 am  Appointments are required, call [phone]    If you have clinical questions after hours or would like to schedule an appointment,  call the clinic at [phone].    Self-Care for Headaches  Most headaches aren't serious and can be relieved with self-care. But some headaches may be a sign of another health problem like eye trouble or high blood pressure. To find the best treatment, learn what kind of headaches you get. For tension headaches, self-care will usually help. To treat migraines, ask your healthcare provider for advice. It is also possible to get both tension and migraine headaches. Self-care involves relieving the pain and avoiding headache  triggers  if you can.    Ways to reduce pain and tension  Try these steps:    Apply a cold compress or ice pack to the pain site.    Drink fluids. If nausea makes it hard to drink, try sucking on ice.    Rest. Protect yourself from bright light and loud noises.    Calm your emotions by imagining a peaceful scene.    Massage tight neck, shoulder, and head muscles.    To relax muscles, soak in a hot bath or use a hot shower.  Use medicines  Aspirin or aspirin substitutes, such as ibuprofen and acetaminophen, can relieve headache. Remember: Never give aspirin to anyone 18 years old or younger because of the risk of  "developing Reye syndrome. Use pain medicines only when necessary.  Track your headaches  Keeping a headache diary can help you and your healthcare provider identify what's causing your headaches:    Note when each headache happens.    Identify your activities and the foods you've eaten 6 to 8 hours before the headache began.    Look for any trends or \"triggers.\"  Signs of tension headache  Any of the following can be signs:    Dull pain or feeling of pressure in a tight band around your head    Pain in your neck or shoulders    Headache without a definite beginning or end    Headache after an activity such as driving or working on a computer  Signs of migraine  Any of the following can be signs:    Throbbing pain on one or both sides of your head    Nausea or vomiting    Extreme sensitivity to light, sound, and smells    Bright spots, flashes, or other visual changes    Pain or nausea so severe that you can't continue your daily activities  Call your healthcare provider   If you have any of the following symptoms, contact your healthcare provider:    A headache that lingers after a recent injury or bump to the head.    A fever with a stiff neck or pain when you bend your head toward your chest.    A headache along with slurred speech, changes in your vision, or numbness or weakness in your arms or legs.    A headache for longer than 3 days.    Frequent headaches, especially in the morning.    Headaches with seizures     Seek immediate medical attention if you have a headache that you would call \"the worst headache you have ever had.\"   Date Last Reviewed: 10/4/2015    0012-6223 [employer]. [address] [place] [address], [place] [address]. All rights reserved. This information is not intended as a substitute for professional medical care. Always follow your healthcare professional's instructions.        "

## 2018-04-27 ENCOUNTER — HOSPITAL ENCOUNTER (EMERGENCY)
Facility: CLINIC | Age: 53
Discharge: HOME | End: 2018-04-27
Attending: EMERGENCY MEDICINE | Admitting: EMERGENCY MEDICINE
Payer: COMMERCIAL

## 2018-04-27 VITALS
SYSTOLIC BLOOD PRESSURE: 117 MMHG | TEMPERATURE: 98.3 F | DIASTOLIC BLOOD PRESSURE: 74 MMHG | OXYGEN SATURATION: 94 % | RESPIRATION RATE: 16 BRPM

## 2018-04-27 DIAGNOSIS — G43.809 OTHER MIGRAINE WITHOUT STATUS MIGRAINOSUS, NOT INTRACTABLE: ICD-10-CM

## 2018-04-27 PROCEDURE — 96375 TX/PRO/DX INJ NEW DRUG ADDON: CPT

## 2018-04-27 PROCEDURE — 25000128 H RX IP 250 OP 636: Performed by: EMERGENCY MEDICINE

## 2018-04-27 PROCEDURE — 96365 THER/PROPH/DIAG IV INF INIT: CPT

## 2018-04-27 PROCEDURE — 99285 EMERGENCY DEPT VISIT HI MDM: CPT | Mod: 25

## 2018-04-27 PROCEDURE — 96361 HYDRATE IV INFUSION ADD-ON: CPT

## 2018-04-27 PROCEDURE — 99285 EMERGENCY DEPT VISIT HI MDM: CPT | Mod: Z6 | Performed by: EMERGENCY MEDICINE

## 2018-04-27 RX ORDER — MAGNESIUM SULFATE HEPTAHYDRATE 500 MG/ML
1 INJECTION, SOLUTION INTRAMUSCULAR; INTRAVENOUS ONCE
Status: DISCONTINUED | OUTPATIENT
Start: 2018-04-27 | End: 2018-04-27 | Stop reason: RX

## 2018-04-27 RX ORDER — DIPHENHYDRAMINE HYDROCHLORIDE 50 MG/ML
25 INJECTION INTRAMUSCULAR; INTRAVENOUS ONCE
Status: COMPLETED | OUTPATIENT
Start: 2018-04-27 | End: 2018-04-27

## 2018-04-27 RX ORDER — SUMATRIPTAN 50 MG/1
50 TABLET, FILM COATED ORAL
Qty: 9 TABLET | Refills: 1 | Status: SHIPPED | OUTPATIENT
Start: 2018-04-27 | End: 2021-08-16

## 2018-04-27 RX ORDER — METOCLOPRAMIDE HYDROCHLORIDE 5 MG/ML
10 INJECTION INTRAMUSCULAR; INTRAVENOUS ONCE
Status: COMPLETED | OUTPATIENT
Start: 2018-04-27 | End: 2018-04-27

## 2018-04-27 RX ORDER — DEXAMETHASONE SODIUM PHOSPHATE 10 MG/ML
10 INJECTION, SOLUTION INTRAMUSCULAR; INTRAVENOUS ONCE
Status: COMPLETED | OUTPATIENT
Start: 2018-04-27 | End: 2018-04-27

## 2018-04-27 RX ORDER — SUMATRIPTAN 50 MG/1
50 TABLET, FILM COATED ORAL
Qty: 9 TABLET | Refills: 1 | Status: SHIPPED | OUTPATIENT
Start: 2018-04-27 | End: 2018-09-05

## 2018-04-27 RX ORDER — MAGNESIUM SULFATE 1 G/100ML
1 INJECTION INTRAVENOUS ONCE
Status: COMPLETED | OUTPATIENT
Start: 2018-04-27 | End: 2018-04-27

## 2018-04-27 RX ORDER — KETOROLAC TROMETHAMINE 30 MG/ML
30 INJECTION, SOLUTION INTRAMUSCULAR; INTRAVENOUS ONCE
Status: COMPLETED | OUTPATIENT
Start: 2018-04-27 | End: 2018-04-27

## 2018-04-27 RX ORDER — SODIUM CHLORIDE 9 MG/ML
1000 INJECTION, SOLUTION INTRAVENOUS CONTINUOUS
Status: DISCONTINUED | OUTPATIENT
Start: 2018-04-27 | End: 2018-04-27 | Stop reason: HOSPADM

## 2018-04-27 RX ORDER — SUMATRIPTAN 6 MG/.5ML
6 INJECTION, SOLUTION SUBCUTANEOUS ONCE
Status: COMPLETED | OUTPATIENT
Start: 2018-04-27 | End: 2018-04-27

## 2018-04-27 RX ADMIN — DIPHENHYDRAMINE HYDROCHLORIDE 25 MG: 50 INJECTION, SOLUTION INTRAMUSCULAR; INTRAVENOUS at 10:21

## 2018-04-27 RX ADMIN — SUMATRIPTAN SUCCINATE 6 MG: 6 INJECTION SUBCUTANEOUS at 13:41

## 2018-04-27 RX ADMIN — SODIUM CHLORIDE 1000 ML: 9 INJECTION, SOLUTION INTRAVENOUS at 10:20

## 2018-04-27 RX ADMIN — METOCLOPRAMIDE 10 MG: 5 INJECTION, SOLUTION INTRAMUSCULAR; INTRAVENOUS at 10:23

## 2018-04-27 RX ADMIN — DEXAMETHASONE SODIUM PHOSPHATE 10 MG: 10 INJECTION, SOLUTION INTRAMUSCULAR; INTRAVENOUS at 12:25

## 2018-04-27 RX ADMIN — KETOROLAC TROMETHAMINE 30 MG: 30 INJECTION, SOLUTION INTRAMUSCULAR at 10:21

## 2018-04-27 RX ADMIN — MAGNESIUM SULFATE IN DEXTROSE 1 G: 10 INJECTION, SOLUTION INTRAVENOUS at 12:25

## 2018-04-27 ASSESSMENT — PATIENT HEALTH QUESTIONNAIRE - PHQ9: SUM OF ALL RESPONSES TO PHQ QUESTIONS 1-9: 7

## 2018-04-27 ASSESSMENT — ANXIETY QUESTIONNAIRES: GAD7 TOTAL SCORE: 6

## 2018-04-27 NOTE — ED AVS SNAPSHOT
[facility] Emergency Department    [address] [address]    Phone:  [phone]    Fax:  [phone]                                       Perri Scherer   MRN: [id number]    Department:  [facility] Emergency Department   Date of Visit:  4/27/2018           After Visit Summary Signature Page     I have received my discharge instructions, and my questions have been answered. I have discussed any challenges I see with this plan with the nurse or doctor.    ..........................................................................................................................................  Patient/Patient Representative Signature      ..........................................................................................................................................  Patient Representative Print Name and Relationship to Patient    ..................................................               ................................................  Date                                            Time    ..........................................................................................................................................  Reviewed by Signature/Title    ...................................................              ..............................................  Date                                                            Time

## 2018-04-27 NOTE — DISCHARGE INSTRUCTIONS
Migraines and Cluster Headaches  Migraines and cluster headaches cause intense, throbbing pain on one side of the head. With a migraine, you may have nausea and vomiting and be sensitive to light and sound. You may also have warning signs, such as flashing lights or loss of parts of your vision, before the pain starts. This is called an aura. Migraines are 3 times more common in women than men. This may be due to hormonal changes during menstruation. Typical migraines may last for 4 to 72 hours untreated.  Cluster headaches recur in groups for days, weeks, or months. The pain is centered around or behind one eye. The eye may also become red or teary, or the eyelid may droop. Migraines and cluster headaches can have many triggers.    Preventing migraines and cluster headaches  Try the following steps:    Don't eat aged cheeses, nuts, beans, chocolate, red wine, or foods that contain caffeine, alcohol, tobacco, nitrates, and MSG.    Try not to skip meals.    Don t work in poor lighting.    Reduce stress as much as you can.    Get plenty of sleep each night.    Exercise regularly under your doctor s guidance.    Don't take headache medicines for more than 3 days, because of the risk of rebound headaches.    Don't take certain prescription medicines that are known to cause rebound headaches.  Relieving the pain  Try these suggestions:    Stay quiet and rest.    Use cold to numb the pain. Wrap ice or a cold can of soda in a cloth. Hold it against the site of pain for 10 minutes. Repeat every 20 minutes.    Stay out of the light. Wear dark glasses, turn out lights, and close the curtains. When outdoors, wear a brimmed hat.    Drink lots of fluids. Sip caffeine-free flat soda to help relieve nausea.    See your doctor if you get migraines or cluster headaches often. There are effective medicines to help treat or prevent them.    Hormone therapy. This may help women whose migraines are related to hormonal changes during  menstruation.  Date Last Reviewed: 12/1/2017 2000-2017 [employer]. [address] [place] [address], [place] [address]. All rights reserved. This information is not intended as a substitute for professional medical care. Always follow your healthcare professional's instructions.

## 2018-04-27 NOTE — ED PROVIDER NOTES
History     Chief Complaint   Patient presents with     Headache     Pt states she has had a migraine for the 2 days ago.  Seen in clinic yesterday, headache feeling worse after being here.  Pt started on Fioricet yesterday, made heacdache worse.  Took one of 's pain pills this morning to help with pain.      SANDRINE Scherer is a 52 year old female who presents with right temporal headache consistent with prior migraines.  She does not take any prophylactic medication, she tried some Fioricet yesterday which actually seemed to make things worse gave her difficulty sleeping last night.  She denies focal neurologic change, recent or prior significant head trauma, febrile illness or any atypical features with respect to her headache.  She has nausea without vomiting.  She describes the pain as sharp lancinating intermittently with baseline throbbing.      Problem List:    Patient Active Problem List    Diagnosis Date Noted     Benign essential hypertension 07/17/2017     Priority: Medium     Hypothyroidism 04/25/2016     Priority: Medium     Hyperlipidemia LDL goal <160 01/22/2015     Priority: Medium     Nicotine use disorder 01/19/2015     Priority: Medium     E-cigs       Family history of diabetes mellitus 01/19/2015     Priority: Medium        Past Medical History:    Past Medical History:   Diagnosis Date     Smoking        Past Surgical History:    Past Surgical History:   Procedure Laterality Date     TUBAL LIGATION  1992       Family History:    Family History   Problem Relation Age of Onset     DIABETES Mother      C.A.D. Father      Alzheimer Disease Maternal Grandfather        Social History:  Marital Status:   [2]  Social History   Substance Use Topics     Smoking status: Former Smoker     Types: Cigarettes     Quit date: 11/1/2017     Smokeless tobacco: Never Used      Comment:   otherwise E-cigs only     Alcohol use Yes      Comment: occasional        Medications:       butalbital-acetaminophen-caffeine (FIORICET/ESGIC) -40 MG per tablet   levothyroxine (SYNTHROID/LEVOTHROID) 100 MCG tablet   lisinopril (PRINIVIL/ZESTRIL) 20 MG tablet   SUMAtriptan (IMITREX) 50 MG tablet   SUMAtriptan (IMITREX) 50 MG tablet         Review of Systems  All other systems reviewed and are negative.    Physical Exam   BP: (!) 164/105  Heart Rate: 85  Temp: 98.3  F (36.8  C)  Resp: 16  SpO2: 97 %      Physical Exam  Nontoxic-appearing no respiratory distress alert and oriented x3.    Head atraumatic normocephalic    Cranial nerves; vision baseline fields intact, PERRL, EOMI, facial sensation intact to light touch, facial muscle tone intact and symmetrical, hearing grossly intact,swallowing without difficulty, voice baseline and normal, SCM  strength intact, tongue protrudes midline.  Palatal elevation symmetric    TM's unremarkable, EACs clear, oropharynx moist without lesions or erythema.    Neck supple full active painless range of motion no posterior midline tenderness.    No cervical adenopathy    Lungs clear to auscultation no rales rhonchi or wheezes    Heart regular no murmur S3 or rub    Strength and sensation intact throughout the extremities, skin clear from rash or lesion.      ED Course     ED Course     Procedures               Critical Care time:  none               No results found for this or any previous visit (from the past 24 hour(s)).    Medications   0.9% sodium chloride BOLUS (0 mLs Intravenous Stopped 4/27/18 1344)     Followed by   sodium chloride 0.9% infusion (not administered)   diphenhydrAMINE (BENADRYL) injection 25 mg (25 mg Intravenous Given 4/27/18 1021)   ketorolac (TORADOL) injection 30 mg (30 mg Intravenous Given 4/27/18 1021)   metoclopramide (REGLAN) injection 10 mg (10 mg Intravenous Given 4/27/18 1023)   dexamethasone (DECADRON) injection 10 mg (10 mg Intravenous Given 4/27/18 1225)   magnesium sulfate 1 gm in 100mL D5W intermittent infusion (0 g  Intravenous Stopped 4/27/18 1300)   SUMAtriptan (IMITREX) injection 6 mg (6 mg Subcutaneous Given 4/27/18 1341)       Assessments & Plan (with Medical Decision Making)  52-year-old female history of migraine presents with same, details per HPI.  No red flags either with respect to history or exam.  Initially treated with Toradol 30, Reglan 10, Benadryl 25 and liter normal saline, diminished pain, further treatment Decadron 10, 1 g magnesium sulfate and ultimately 6 mg subcu Imitrex with almost complete relief.  No indication for further evaluation.  Prescription for oral Imitrex.  Return criteria reviewed      I have reviewed the nursing notes.    I have reviewed the findings, diagnosis, plan and need for follow up with the patient.       Discharge Medication List as of 4/27/2018  2:17 PM      START taking these medications    Details   SUMAtriptan (IMITREX) 50 MG tablet Take 1 tablet (50 mg) by mouth at onset of headache for migraine May repeat in 2 hours. Max 4 tablets/24 hours., Disp-9 tablet, R-1, Local Print             Final diagnoses:   Other migraine without status migrainosus, not intractable       4/27/2018   [facility] EMERGENCY DEPARTMENT     Jamaal Mtz MD  04/27/18 6794

## 2018-04-27 NOTE — ED AVS SNAPSHOT
[facility] Emergency Department    [address] [address]    Phone:  [phone]    Fax:  [phone]                                       Perri Scherer   MRN: [id number]    Department:  [facility] Emergency Department   Date of Visit:  4/27/2018           Patient Information     Date Of Birth          1965        Your diagnoses for this visit were:     Other migraine without status migrainosus, not intractable        You were seen by Jamaal Mtz MD.      Follow-up Information     Follow up with Kimmy Peter APRN CNP.    Specialty:  Nurse Practitioner    Contact information:    [address] [facility] [address]  [phone]          Discharge Instructions         Migraines and Cluster Headaches  Migraines and cluster headaches cause intense, throbbing pain on one side of the head. With a migraine, you may have nausea and vomiting and be sensitive to light and sound. You may also have warning signs, such as flashing lights or loss of parts of your vision, before the pain starts. This is called an aura. Migraines are 3 times more common in women than men. This may be due to hormonal changes during menstruation. Typical migraines may last for 4 to 72 hours untreated.  Cluster headaches recur in groups for days, weeks, or months. The pain is centered around or behind one eye. The eye may also become red or teary, or the eyelid may droop. Migraines and cluster headaches can have many triggers.    Preventing migraines and cluster headaches  Try the following steps:    Don't eat aged cheeses, nuts, beans, chocolate, red wine, or foods that contain caffeine, alcohol, tobacco, nitrates, and MSG.    Try not to skip meals.    Don t work in poor lighting.    Reduce stress as much as you can.    Get plenty of sleep each night.    Exercise regularly under your doctor s guidance.    Don't take headache medicines for more than 3 days, because of the risk of rebound  headaches.    Don't take certain prescription medicines that are known to cause rebound headaches.  Relieving the pain  Try these suggestions:    Stay quiet and rest.    Use cold to numb the pain. Wrap ice or a cold can of soda in a cloth. Hold it against the site of pain for 10 minutes. Repeat every 20 minutes.    Stay out of the light. Wear dark glasses, turn out lights, and close the curtains. When outdoors, wear a brimmed hat.    Drink lots of fluids. Sip caffeine-free flat soda to help relieve nausea.    See your doctor if you get migraines or cluster headaches often. There are effective medicines to help treat or prevent them.    Hormone therapy. This may help women whose migraines are related to hormonal changes during menstruation.  Date Last Reviewed: 12/1/2017 2000-2017 The "[employer]. [address] [place] [address], [place] [address]. All rights reserved. This information is not intended as a substitute for professional medical care. Always follow your healthcare professional's instructions.          Your next 10 appointments already scheduled     May 04, 2018  9:30 AM CDT   School Visit with Fl School Provider   [facility]  ([facility] )    [address] [place] [address] [phone]              24 Hour Appointment Hotline       To make an appointment at any [facility], call [phone] ([phone]). If you don't have a family doctor or clinic, we will help you find one. [facility] are conveniently located to serve the needs of you and your family.             Review of your medicines      START taking        Dose / Directions Last dose taken    SUMAtriptan 50 MG tablet   Commonly known as:  IMITREX   Dose:  50 mg   Quantity:  9 tablet        Take 1 tablet (50 mg) by mouth at onset of headache for migraine May repeat in 2 hours. Max 4 tablets/24 hours.   Refills:  1          Our records show that  you are taking the medicines listed below. If these are incorrect, please call your family doctor or clinic.        Dose / Directions Last dose taken    butalbital-acetaminophen-caffeine -40 MG per tablet   Commonly known as:  FIORICET/ESGIC   Dose:  1 tablet   Quantity:  15 tablet        Take 1 tablet by mouth every 6 hours as needed   Refills:  0        levothyroxine 100 MCG tablet   Commonly known as:  SYNTHROID/LEVOTHROID   Dose:  100 mcg   Quantity:  90 tablet        Take 1 tablet (100 mcg) by mouth daily   Refills:  3        lisinopril 20 MG tablet   Commonly known as:  PRINIVIL/ZESTRIL   Dose:  20 mg   Quantity:  90 tablet        Take 1 tablet (20 mg) by mouth daily   Refills:  3                Prescriptions were sent or printed at these locations (1 Prescription)                   Other Prescriptions                Printed at Department/Unit printer (1 of 1)         SUMAtriptan (IMITREX) 50 MG tablet                Orders Needing Specimen Collection     None      Pending Results     No orders found from 4/25/2018 to 4/28/2018.            Pending Culture Results     No orders found from 4/25/2018 to 4/28/2018.            Pending Results Instructions     If you had any lab results that were not finalized at the time of your Discharge, you can call the ED Lab Result RN at [phone]. You will be contacted by this team for any positive Lab results or changes in treatment. The nurses are available 7 days a week from 10A to 6:30P.  You can leave a message 24 hours per day and they will return your call.        Test Results From Your Hospital Stay               Thank you for choosing [facility]       Thank you for choosing [facility] for your care. Our goal is always to provide you with excellent care. Hearing back from our patients is one way we can continue to improve our services. Please take a few minutes to complete the written survey that you may receive in the mail after you visit with us. Thank you!    "     MyChart Information     [facility] lets you send messages to your doctor, view your test results, renew your prescriptions, schedule appointments and more. To sign up, go to [email or web address] . Click on \"Log in\" on the left side of the screen, which will take you to the Welcome page. Then click on \"Sign up Now\" on the right side of the page.     You will be asked to enter the access code listed below, as well as some personal information. Please follow the directions to create your username and password.     Your access code is: [email or web address]  Expires: 2018  7:51 AM     Your access code will  in 90 days. If you need help or a new code, please call your [place] clinic or [phone].        Care EveryWhere ID     This is your Care EveryWhere ID. This could be used by other organizations to access your [place] medical records  [id number]        Equal Access to Services     JEAN BECKMAN AH: Hadii raul richmondo Soluz elena, waaxda luqadaha, qaybta kaalmada ademargaret, cynthia santana . So [facility] [phone].    ATENCIÓN: Si habla español, tiene a david disposición servicios gratuitos de asistencia lingüística. Llame al [phone].    We comply with applicable federal civil rights laws and [place] laws. We do not discriminate on the basis of race, color, national origin, age, disability, sex, sexual orientation, or gender identity.            After Visit Summary       This is your record. Keep this with you and show to your community pharmacist(s) and doctor(s) at your next visit.                  "

## 2018-05-01 PROBLEM — G43.809 OTHER MIGRAINE WITHOUT STATUS MIGRAINOSUS, NOT INTRACTABLE: Status: ACTIVE | Noted: 2018-05-01

## 2018-05-01 PROBLEM — E03.9 HYPOTHYROIDISM, UNSPECIFIED TYPE: Status: ACTIVE | Noted: 2018-05-01

## 2018-05-04 ENCOUNTER — OFFICE VISIT (OUTPATIENT)
Dept: LAB | Facility: SCHOOL | Age: 53
End: 2018-05-04
Payer: COMMERCIAL

## 2018-05-04 VITALS
TEMPERATURE: 98.8 F | HEART RATE: 78 BPM | OXYGEN SATURATION: 96 % | DIASTOLIC BLOOD PRESSURE: 86 MMHG | SYSTOLIC BLOOD PRESSURE: 138 MMHG | BODY MASS INDEX: 23.9 KG/M2 | WEIGHT: 140 LBS | HEIGHT: 64 IN

## 2018-05-04 DIAGNOSIS — Z00.00 WELLNESS EXAMINATION: Primary | ICD-10-CM

## 2018-05-04 DIAGNOSIS — I10 BENIGN ESSENTIAL HYPERTENSION: ICD-10-CM

## 2018-05-04 PROCEDURE — 99213 OFFICE O/P EST LOW 20 MIN: CPT | Performed by: NURSE PRACTITIONER

## 2018-05-04 NOTE — PROGRESS NOTES
"  SUBJECTIVE:  CC: Perri Scherer is an 52 year old woman who presents for Wellness Check at [facility] [id number].     Review of Healthy Lifestyle:    Do you get at least three servings of calcium containing foods daily (dairy, green leafy vegetables, etc.)? yes     Do you have a high-fiber diet? no     Amount of exercise or daily activities, outside of work: 4 day(s) per week    Do you wear sunscreen on a regular basis? Yes      Are you taking your medications regularly Yes     Have you had an eye exam in the past two years? yes    Do you see a dentist twice per year? yes    Do you have sleep apnea, excessive snoring or excessive daytime drowsiness? no    Do you use tobacco in any form? She quit smoking in November, she is currently using an Ecig at the lowest level of nicotine.        OBJECTIVE:    Vitals: /86 (BP Location: Left arm, Patient Position: Chair, Cuff Size: Adult Regular)  Pulse 78  Temp 98.8  F (37.1  C) (Tympanic)  Ht 5' 3.5\" (1.613 m)  Wt 140 lb (63.5 kg)  SpO2 96%  BMI 24.41 kg/m2  BMI= Body mass index is 24.41 kg/(m^2).    HEARING: Right Ear:        500Hz:  RESPONSE- on Level:   20 db    1000 Hz: RESPONSE- on Level:   20 db    2000 Hz: RESPONSE- on Level:   20 db    4000 Hz: RESPONSE- on Level: 25 db    Left Ear:       500Hz:  RESPONSE- on Level: tone not heard   1000 Hz: RESPONSE- on Level: 40 db   2000 Hz: RESPONSE- on Level:   20 db    4000 Hz: RESPONSE- on Level: tone not heard    VISION:  Right eye:  20/16  Left eye:     20/63  Both eyes: 20/20  Corrective lenses?  Yes    Medication Reconciliation: complete    ASSESSMENT/PLAN: Counseled on healthy diet and exercise. Declines fasting labs today. Referred to PCP for annual physical / health maintenance items and elevated BP. Patient states she has mono vision contacts and her eye screen is normal for those. 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 " "    COUNSELING:      reports that she quit smoking about 6 months ago. Her smoking use included Cigarettes. She has never used smokeless tobacco.  Tobacco Cessation Action Plan: Pt currently working on plan to quit, titrating down on liquid nicotine, counseled.   Estimated body mass index is 24.41 kg/(m^2) as calculated from the following:    Height as of this encounter: 5' 3.5\" (1.613 m).    Weight as of this encounter: 140 lb (63.5 kg).       Counseling Resources  [employer]  [email or web address]    KATEY Feng [facility] SCHOOL PROVIDER  [facility]     "

## 2018-05-04 NOTE — MR AVS SNAPSHOT
After Visit Summary   5/4/2018    Perri Scherer    MRN: [id number]           Patient Information     Date Of Birth          1965        Visit Information        Provider Department      5/4/2018 9:30 AM Provider, [facility] 831        Care Instructions                    Perri Scherer has completed a Wellness Check at the [facility] Clinic on 5/4/2018.      ____________________________________________  [facility] PROVIDER                                                                               Wellness Visit Recommendations:      See your regular primary care health provider every year in order to help stay healthy.    Review health changes.     Review your medicines if your doctor has prescribed any.    Talk to your provider about how often to have your cholesterol checked.    If you are at risk for diabetes, you should have a diabetes test (fasting glucose).    Shots: Get a flu shot each year. Get a tetanus shot every 10 years.     Review with your primary care provider other immunizations that you may need based on your age and/or medical/surgical history    Nutrition:     Eat at least 5 servings of fruits and vegetables each day.    Eat whole-grain bread, whole-wheat pasta and brown rice instead of white grains and rice.  You can visit: http://www.mayoclinic.org/healthy-lifestyle/nutrition-and-healthy-eating/in-depth/mediterranean-diet/art-39752801    For some information on a healthy diet.         Preventive Care to be reviewed by your primary care provider:    Females:        Cervical Cancer Screening                          Breast Cancer Screening                          Colon Cancer Screening  Males:             Prostrate Cancer Screening                          Colon Cancer Screening      Lifestyle:    Exercise at least 150 minutes a week (30 minutes a day, 5 days of the week). This will help you control your weight and help  "prevent disease or manage disease.    Limit alcohol to one drink per day or less depending on your past medical history.    No smoking.     Wear sunscreen to prevent skin cancer.    See your dentist every six months for an exam and cleaning.    Today's Vital Signs:  /86 (BP Location: Left arm, Patient Position: Chair, Cuff Size: Adult Regular)  Pulse 78  Temp 98.8  F (37.1  C) (Tympanic)  Ht 5' 3.5\" (1.613 m)  Wt 140 lb (63.5 kg)  SpO2 96%  BMI 24.41 kg/m2          Follow-ups after your visit        Who to contact     If you have questions or need follow up information about today's clinic visit or your schedule please contact [place] [address] directly at [phone].  Normal or non-critical lab and imaging results will be communicated to you by [employer], letter or phone within 4 business days after the clinic has received the results. If you do not hear from us within 7 days, please contact the clinic through [employer] or phone. If you have a critical or abnormal lab result, we will notify you by phone as soon as possible.  Submit refill requests through [employer] or call your pharmacy and they will forward the refill request to us. Please allow 3 business days for your refill to be completed.          Additional Information About Your Visit        [employer] Information     [employer] lets you send messages to your doctor, view your test results, renew your prescriptions, schedule appointments and more. To sign up, go to [email or web address] . Click on \"Log in\" on the left side of the screen, which will take you to the Welcome page. Then click on \"Sign up Now\" on the right side of the page.     You will be asked to enter the access code listed below, as well as some personal information. Please follow the directions to create your username and password.     Your access code is: [email or web address]  Expires: 2018  7:51 AM     Your access code will  in 90 days. If you need help or a new " "code, please call your [facility] clinic or [phone].        Care EveryWhere ID     This is your Care EveryWhere ID. This could be used by other organizations to access your [facility] medical records  [id number]        Your Vitals Were     Pulse Temperature Height Pulse Oximetry BMI (Body Mass Index)       78 98.8  F (37.1  C) (Tympanic) 5' 3.5\" (1.613 m) 96% 24.41 kg/m2        Blood Pressure from Last 3 Encounters:   05/04/18 138/86   04/27/18 117/74   04/26/18 140/86    Weight from Last 3 Encounters:   05/04/18 140 lb (63.5 kg)   04/26/18 140 lb (63.5 kg)   08/31/17 135 lb 6.4 oz (61.4 kg)              Today, you had the following     No orders found for display       Primary Care Provider Office Phone # Fax #    Kimmy Peter, APRN [facility] [phone] [address] [address]        Equal Access to Services     [facility]: Hadii aad ku hadasho Soomaali, waaxda luqadaha, qaybta kaalmada adeegyada, waxrobi santana . So [facility] [phone].    ATENCIÓN: Si habla español, tiene a david disposición servicios gratuitos de asistencia lingüística. Llame al [phone].    We comply with applicable federal civil rights laws and [place] laws. We do not discriminate on the basis of race, color, national origin, age, disability, sex, sexual orientation, or gender identity.            Thank you!     Thank you for choosing [place] [address]  for your care. Our goal is always to provide you with excellent care. Hearing back from our patients is one way we can continue to improve our services. Please take a few minutes to complete the written survey that you may receive in the mail after your visit with us. Thank you!             Your Updated Medication List - Protect others around you: Learn how to safely use, store and throw away your medicines at www.disposemymeds.org.          This list is accurate as of 5/4/18  9:34 AM.  Always use your most " recent med list.                   Brand Name Dispense Instructions for use Diagnosis    butalbital-acetaminophen-caffeine -40 MG per tablet    FIORICET/ESGIC    15 tablet    Take 1 tablet by mouth every 6 hours as needed    Other migraine without status migrainosus, not intractable       levothyroxine 100 MCG tablet    SYNTHROID/LEVOTHROID    90 tablet    Take 1 tablet (100 mcg) by mouth daily    Hypothyroidism, unspecified type       lisinopril 20 MG tablet    PRINIVIL/ZESTRIL    90 tablet    Take 1 tablet (20 mg) by mouth daily    Benign essential hypertension       * SUMAtriptan 50 MG tablet    IMITREX    9 tablet    Take 1 tablet (50 mg) by mouth at onset of headache for migraine May repeat in 2 hours. Max 4 tablets/24 hours.        * SUMAtriptan 50 MG tablet    IMITREX    9 tablet    Take 1 tablet (50 mg) by mouth at onset of headache for migraine May repeat in 2 hours. Max 4 tablets/24 hours.        * Notice:  This list has 2 medication(s) that are the same as other medications prescribed for you. Read the directions carefully, and ask your doctor or other care provider to review them with you.

## 2018-05-04 NOTE — PATIENT INSTRUCTIONS
Perri Scherer has completed a Wellness Check at the [facility]  Clinic on 5/4/2018.      ____________________________________________  [employer] PROVIDER                                                                               Wellness Visit Recommendations:      See your regular primary care health provider every year in order to help stay healthy.    Review health changes.     Review your medicines if your doctor has prescribed any.    Talk to your provider about how often to have your cholesterol checked.    If you are at risk for diabetes, you should have a diabetes test (fasting glucose).    Shots: Get a flu shot each year. Get a tetanus shot every 10 years.     Review with your primary care provider other immunizations that you may need based on your age and/or medical/surgical history    Nutrition:     Eat at least 5 servings of fruits and vegetables each day.    Eat whole-grain bread, whole-wheat pasta and brown rice instead of white grains and rice.  You can visit: http://www.mayoinic.org/healthy-lifestyle/nutrition-and-healthy-eating/in-depth/mediterranean-diet/art-08392882    For some information on a healthy diet.         Preventive Care to be reviewed by your primary care provider:    Females:        Cervical Cancer Screening                          Breast Cancer Screening                          Colon Cancer Screening  Males:             Prostrate Cancer Screening                          Colon Cancer Screening      Lifestyle:    Exercise at least 150 minutes a week (30 minutes a day, 5 days of the week). This will help you control your weight and help prevent disease or manage disease.    Limit alcohol to one drink per day or less depending on your past medical history.    No smoking.     Wear sunscreen to prevent skin cancer.    See your dentist every six months for an exam and cleaning.    Today's Vital Signs:  /86 (BP Location: Left arm, Patient  "Position: Chair, Cuff Size: Adult Regular)  Pulse 78  Temp 98.8  F (37.1  C) (Tympanic)  Ht 5' 3.5\" (1.613 m)  Wt 140 lb (63.5 kg)  SpO2 96%  BMI 24.41 kg/m2  "

## 2018-05-07 DIAGNOSIS — E03.9 HYPOTHYROIDISM, UNSPECIFIED TYPE: ICD-10-CM

## 2018-05-07 NOTE — TELEPHONE ENCOUNTER
"Requested Prescriptions   Pending Prescriptions Disp Refills     levothyroxine (SYNTHROID/LEVOTHROID) 100 MCG tablet [Pharmacy Med Name: LEVOTHYROXINE 0.100MG (100MCG) TAB]  Last Written Prescription Date:  05/12/2017  Last Fill Quantity: 90,  # refills: 3   Last office visit: 5/4/2018 with prescribing provider:  Silas   Future Office Visit:     90 tablet 0     Sig: TAKE 1 TABLET(100 MCG) BY MOUTH DAILY    Thyroid Protocol Passed    5/7/2018  4:54 PM       Passed - Patient is 12 years or older       Passed - Recent (12 mo) or future (30 days) visit within the authorizing provider's specialty    Patient had office visit in the last 12 months or has a visit in the next 30 days with authorizing provider or within the authorizing provider's specialty.  See \"Patient Info\" tab in inbasket, or \"Choose Columns\" in Meds & Orders section of the refill encounter.           Passed - Normal TSH on file in past 12 months    Recent Labs   Lab Test  04/26/18   0755   TSH  1.12             Passed - No active pregnancy on record    If patient is pregnant or has had a positive pregnancy test, please check TSH.         Passed - No positive pregnancy test in past 12 months    If patient is pregnant or has had a positive pregnancy test, please check TSH.            "

## 2018-05-08 RX ORDER — LEVOTHYROXINE SODIUM 100 UG/1
TABLET ORAL
Qty: 90 TABLET | Refills: 3 | Status: SHIPPED | OUTPATIENT
Start: 2018-05-08 | End: 2019-05-06

## 2018-05-08 NOTE — TELEPHONE ENCOUNTER
Prescription approved per [facility] Refill Protocol.  Angelica HYATT RN

## 2018-06-22 ENCOUNTER — OFFICE VISIT (OUTPATIENT)
Dept: FAMILY MEDICINE | Facility: CLINIC | Age: 53
End: 2018-06-22
Payer: COMMERCIAL

## 2018-06-22 VITALS
DIASTOLIC BLOOD PRESSURE: 81 MMHG | HEIGHT: 64 IN | BODY MASS INDEX: 24.09 KG/M2 | SYSTOLIC BLOOD PRESSURE: 118 MMHG | HEART RATE: 82 BPM | TEMPERATURE: 99.1 F | WEIGHT: 141.1 LBS

## 2018-06-22 DIAGNOSIS — J02.9 PHARYNGITIS, UNSPECIFIED ETIOLOGY: Primary | ICD-10-CM

## 2018-06-22 DIAGNOSIS — Z12.11 SPECIAL SCREENING FOR MALIGNANT NEOPLASMS, COLON: ICD-10-CM

## 2018-06-22 LAB
DEPRECATED S PYO AG THROAT QL EIA: NORMAL
SPECIMEN SOURCE: NORMAL

## 2018-06-22 PROCEDURE — 87880 STREP A ASSAY W/OPTIC: CPT | Performed by: NURSE PRACTITIONER

## 2018-06-22 PROCEDURE — 99213 OFFICE O/P EST LOW 20 MIN: CPT | Performed by: NURSE PRACTITIONER

## 2018-06-22 PROCEDURE — 87081 CULTURE SCREEN ONLY: CPT | Performed by: NURSE PRACTITIONER

## 2018-06-22 NOTE — PROGRESS NOTES
"  SUBJECTIVE:   Perri Scherer is a 52 year old female who presents to clinic today for the following health issues:    ENT Symptoms             Symptoms: cc Present Absent Comment   Fever/Chills  x  Chills    Fatigue   x    Muscle Aches   x    Eye Irritation   x    Sneezing   x    Nasal Chandrakant/Drg  x     Sinus Pressure/Pain   x    Loss of smell   x    Dental pain   x    Sore Throat  x     Swollen Glands  x     Ear Pain/Fullness   x    Cough  x     Wheeze   x    Chest Pain   x    Shortness of breath   x    Rash   x    Other  x  Headache, feeling nauseous      Symptom duration: 2 days    Symptom severity:  moderate   Treatments tried: Ibuprofen    Contacts: None      Problem list and histories reviewed & adjusted, as indicated.  Additional history: as documented    Labs reviewed in EPIC    Reviewed and updated as needed this visit by clinical staff  Tobacco  Allergies  Med Hx  Surg Hx  Fam Hx  Soc Hx      Reviewed and updated as needed this visit by Provider         ROS:  Constitutional, HEENT, cardiovascular, pulmonary, gi and gu systems are negative, except as otherwise noted.    OBJECTIVE:     /81  Pulse 82  Temp 99.1  F (37.3  C) (Tympanic)  Ht 5' 3.5\" (1.613 m)  Wt 141 lb 1.6 oz (64 kg)  BMI 24.6 kg/m2  Body mass index is 24.6 kg/(m^2).  GENERAL: healthy, alert and no distress  EYES: Eyes grossly normal to inspection, PERRL and conjunctivae and sclerae normal  HENT: normal cephalic/atraumatic, nose and mouth without ulcers or lesions and tonsillar erythema  NECK: bilateral anterior cervical adenopathy  RESP: lungs clear to auscultation - no rales, rhonchi or wheezes  CV: regular rate and rhythm, normal S1 S2, no S3 or S4, no murmur, click or rub, no peripheral edema and peripheral pulses strong  PSYCH: mentation appears normal, affect normal/bright    Diagnostic Test Results:  Strep screen - Negative    ASSESSMENT/PLAN:     1. Pharyngitis, unspecified etiology  - Strep, Rapid Screen-negative  - Beta " strep group A culture  -discussed symptomatic treatment, see AVS    2. Special screening for malignant neoplasms, colon  - Fecal colorectal cancer screen (FIT); Future    See Patient Instructions    KATEY Liriano [facility]

## 2018-06-22 NOTE — PATIENT INSTRUCTIONS
Your rapid strep test was negative.  We'll call you if the confirmatory culture shows anything different.    This looks like a viral infection causing your sore throat.  Unfortunately there's not much we can do to get this better faster, it's mostly letting it run its course.    What we can do is work on symptomatic measures including increased fluids, rest, appropriate use of tylenol and ibuprofen, throat drops/lozenges, increased fluids, and licorice/mint teas.    We'll contact you by phone if your confirmatory throat culture is positive.    Follow-up if symptoms persist without improvement for more than 1 week or if symptoms worsening or changing notably in the meantime.

## 2018-06-22 NOTE — LETTER
June 25, 2018      Perri Scherer  [address] [facility] [address]                The results of your recent throat culture were negative.  If you have any further questions or concerns please contact the clinic          Sincerely,        KATEY Liriano CNP/ls

## 2018-06-22 NOTE — MR AVS SNAPSHOT
After Visit Summary   6/22/2018    Perri Scherer    MRN: [id number]           Patient Information     Date Of Birth          1965        Visit Information        Provider Department      6/22/2018 9:00 AM Gabby Stratton APRN CNP [facility]        Today's Diagnoses     Throat pain    -  1    Special screening for malignant neoplasms, colon          Care Instructions    Your rapid strep test was negative.  We'll call you if the confirmatory culture shows anything different.    This looks like a viral infection causing your sore throat.  Unfortunately there's not much we can do to get this better faster, it's mostly letting it run its course.    What we can do is work on symptomatic measures including increased fluids, rest, appropriate use of tylenol and ibuprofen, throat drops/lozenges, increased fluids, and licorice/mint teas.    We'll contact you by phone if your confirmatory throat culture is positive.    Follow-up if symptoms persist without improvement for more than 1 week or if symptoms worsening or changing notably in the meantime.            Follow-ups after your visit        Future tests that were ordered for you today     Open Future Orders        Priority Expected Expires Ordered    Fecal colorectal cancer screen (FIT) Routine 7/13/2018 11/14/2018 6/22/2018            Who to contact     If you have questions or need follow up information about today's clinic visit or your schedule please contact [facility] directly at [phone].  Normal or non-critical lab and imaging results will be communicated to you by MyChart, letter or phone within 4 business days after the clinic has received the results. If you do not hear from us within 7 days, please contact the clinic through MyChart or phone. If you have a critical or abnormal lab result, we will notify you by phone as soon as possible.  Submit refill requests through [employer] or call your pharmacy and  "they will forward the refill request to us. Please allow 3 business days for your refill to be completed.          Additional Information About Your Visit        Care EveryWhere ID     This is your Care EveryWhere ID. This could be used by other organizations to access your [facility] medical records  [id number]        Your Vitals Were     Pulse Temperature Height BMI (Body Mass Index)          82 99.1  F (37.3  C) (Tympanic) 5' 3.5\" (1.613 m) 24.6 kg/m2         Blood Pressure from Last 3 Encounters:   06/22/18 118/81   05/04/18 138/86   04/27/18 117/74    Weight from Last 3 Encounters:   06/22/18 141 lb 1.6 oz (64 kg)   05/04/18 140 lb (63.5 kg)   04/26/18 140 lb (63.5 kg)              We Performed the Following     Beta strep group A culture     Strep, Rapid Screen        Primary Care Provider Office Phone # Fax #    Kimmy Peter, APRN [facility] [phone] [address] [facility] [address]        Equal Access to Services     ROHITH EBCKMAN : Hadii aad ku hadasho Soomaali, waaxda luqadaha, qaybta kaalmada adeegyasteven, cynthia santana . So [facility] [phone].    ATENCIÓN: Si habla español, tiene a david disposición servicios gratuitos de asistencia lingüística. Tracy al [phone].    We comply with applicable federal civil rights laws and [place] laws. We do not discriminate on the basis of race, color, national origin, age, disability, sex, sexual orientation, or gender identity.            Thank you!     Thank you for choosing [facility]  for your care. Our goal is always to provide you with excellent care. Hearing back from our patients is one way we can continue to improve our services. Please take a few minutes to complete the written survey that you may receive in the mail after your visit with us. Thank you!             Your Updated Medication List - Protect others around you: Learn how to safely use, store and throw away your medicines at " www.disposemymeds.org.          This list is accurate as of 6/22/18  9:13 AM.  Always use your most recent med list.                   Brand Name Dispense Instructions for use Diagnosis    butalbital-acetaminophen-caffeine -40 MG per tablet    FIORICET/ESGIC    15 tablet    Take 1 tablet by mouth every 6 hours as needed    Other migraine without status migrainosus, not intractable       levothyroxine 100 MCG tablet    SYNTHROID/LEVOTHROID    90 tablet    TAKE 1 TABLET(100 MCG) BY MOUTH DAILY    Hypothyroidism, unspecified type       lisinopril 20 MG tablet    PRINIVIL/ZESTRIL    90 tablet    Take 1 tablet (20 mg) by mouth daily    Benign essential hypertension       * SUMAtriptan 50 MG tablet    IMITREX    9 tablet    Take 1 tablet (50 mg) by mouth at onset of headache for migraine May repeat in 2 hours. Max 4 tablets/24 hours.        * SUMAtriptan 50 MG tablet    IMITREX    9 tablet    Take 1 tablet (50 mg) by mouth at onset of headache for migraine May repeat in 2 hours. Max 4 tablets/24 hours.        * Notice:  This list has 2 medication(s) that are the same as other medications prescribed for you. Read the directions carefully, and ask your doctor or other care provider to review them with you.

## 2018-06-23 LAB
BACTERIA SPEC CULT: NORMAL
SPECIMEN SOURCE: NORMAL

## 2018-08-22 ENCOUNTER — OFFICE VISIT (OUTPATIENT)
Dept: LAB | Facility: SCHOOL | Age: 53
End: 2018-08-22
Payer: COMMERCIAL

## 2018-08-22 VITALS
BODY MASS INDEX: 23.39 KG/M2 | HEIGHT: 64 IN | HEART RATE: 69 BPM | SYSTOLIC BLOOD PRESSURE: 138 MMHG | OXYGEN SATURATION: 97 % | TEMPERATURE: 98.2 F | DIASTOLIC BLOOD PRESSURE: 86 MMHG | WEIGHT: 137 LBS

## 2018-08-22 DIAGNOSIS — L98.9 LESION OF SKIN OF SCALP: ICD-10-CM

## 2018-08-22 DIAGNOSIS — L01.00 IMPETIGO: Primary | ICD-10-CM

## 2018-08-22 PROCEDURE — 99213 OFFICE O/P EST LOW 20 MIN: CPT

## 2018-08-22 RX ORDER — MUPIROCIN 20 MG/G
OINTMENT TOPICAL 3 TIMES DAILY
Qty: 22 G | Refills: 1 | Status: SHIPPED | OUTPATIENT
Start: 2018-08-22 | End: 2018-08-27

## 2018-08-22 RX ORDER — CEPHALEXIN 500 MG/1
500 CAPSULE ORAL 4 TIMES DAILY
Qty: 28 CAPSULE | Refills: 0 | Status: SHIPPED | OUTPATIENT
Start: 2018-08-22 | End: 2018-08-29

## 2018-08-22 NOTE — NURSING NOTE
"Initial /86 (BP Location: Right arm, Patient Position: Chair, Cuff Size: Adult Regular)  Pulse 69  Temp 98.2  F (36.8  C) (Tympanic)  Ht 5' 3.5\" (1.613 m)  Wt 137 lb (62.1 kg)  SpO2 97%  BMI 23.89 kg/m2 Estimated body mass index is 23.89 kg/(m^2) as calculated from the following:    Height as of this encounter: 5' 3.5\" (1.613 m).    Weight as of this encounter: 137 lb (62.1 kg). .    Danelle Cabrera CMA ([facility])  "

## 2018-08-22 NOTE — PROGRESS NOTES
"  SUBJECTIVE:   Perri Scherer is a 52 year old female who presents to clinic today for the following health issues:    Itchy Scalp       Duration: 2 weeks     Description (location/character/radiation): pt is concerned about an itchy scalp     Intensity:  moderate    Accompanying signs and symptoms: itching, some drainage, tender to the touch, new bumps are forming on the back of her head.     History (similar episodes/previous evaluation): this has been ongoing since last winter.     Precipitating or alleviating factors: None    Therapies tried and outcome: OTC triple antibiotic cream.      Patient is being seen at the [facility] clinic.     Lesion on the back of her head x at least six months, if not longer, seems to be growing in size, having some drainage.   Unsure how it really started.        /86 (BP Location: Right arm, Patient Position: Chair, Cuff Size: Adult Regular)  Pulse 69  Temp 98.2  F (36.8  C) (Tympanic)  Ht 5' 3.5\" (1.613 m)  Wt 137 lb (62.1 kg)  SpO2 97%  BMI 23.89 kg/m2  EXAM: GENERAL APPEARANCE ADULT: Alert, no acute distress  SKIN: lesion present, type:ulcerated and honey crusted lesion at the base of her scalp over the occiput.  Measures about 3 cm in diameter.  There is crusting.  No seborrhea noted.    ASSESSMENT/PLAN:      ICD-10-CM    1. Impetigo L01.00 cephALEXin (KEFLEX) 500 MG capsule     mupirocin (BACTROBAN) 2 % ointment   2. Lesion of skin of scalp L98.9 DERMATOLOGY REFERRAL     Etiology uncertain: psoriasis, seborrhea vs basal cell carcinoma.   Given the impetigo present, will work on clearing up the infection first and then have her see dermatology for recheck and consideration of biopsy if necessary.    No topical steroids given at this time given the ongoing infection.    Elissa Simon M.D.          "

## 2018-08-28 ENCOUNTER — NURSE TRIAGE (OUTPATIENT)
Dept: NURSING | Facility: CLINIC | Age: 53
End: 2018-08-28

## 2018-08-28 ENCOUNTER — TELEPHONE (OUTPATIENT)
Dept: FAMILY MEDICINE | Facility: CLINIC | Age: 53
End: 2018-08-28

## 2018-08-28 ENCOUNTER — TELEPHONE (OUTPATIENT)
Dept: LAB | Facility: SCHOOL | Age: 53
End: 2018-08-28

## 2018-08-28 NOTE — TELEPHONE ENCOUNTER
Clinic Action Needed:  Yes, callback  FNA Triage Call  Presenting Problem:    Perri was into clinic last Wednesday and treated for impetigo by MD Elissa Simon and is on medication (Keflex)  and feels she is having  side effects vaginal discharge and itching.  Today Perri is having vaginal itching.  Perri is requesting to speak with MD Simon.  Please phone Perri at [phone] (work).     Routed to:  MD Elissa Simon  Please be sure to close this encounter once this patient's issue/question has been addressed.    Christina Vargas RN/[facility]

## 2018-08-28 NOTE — TELEPHONE ENCOUNTER
Per message below, Perri would like a prescription for a yeast infection. Symptoms include severe itching and vaginal discharge.   Call Perri's cell number and leave a detailed message.  She is at work right now and with travel time may miss a call at work. Cell: [phone]. She uses [facility] in [place].  She'll call her cell phone for messages.  Routed: [id number] Gabby Stratton, MATEUS [facility]  Marline STANTON RN [place] [facility]         Christina Vargas RN        8/28/18 11:11 AM   Note      Perri was into clinic last Wednesday and treated for impetigo by MD Elissa Simon and is on medication (Keflex)  and feels she is having  side effects vaginal discharge and itching.  Today Perri is having vaginal itching.  Perri is requesting to speak with MD Simon.  Please phone Perri at [phone] (work).

## 2018-08-28 NOTE — TELEPHONE ENCOUNTER
Perri was into clinic last Wednesday and treated for impetigo by MD Elissa Simon and is on medication (Keflex)  and feels she is having  side effects vaginal discharge and itching.  Today Perri is having vaginal itching.  Perri is requesting to speak with MD Simon.  Please phone Perri at [phone] (work).

## 2018-08-28 NOTE — TELEPHONE ENCOUNTER
Reason for Call: Seen at [facility] on 8/22/18 for impetigo and would like Rx adjusted because she is having vaginal discharge and itching . Discussed briefly and Pt prefers to call back since FNA advised her [place] clinic is open from 2p-6pm today,  to speak to provider about this directly .   .Nicole Jerez RN [facility].

## 2018-08-28 NOTE — TELEPHONE ENCOUNTER
Patient calls back  S-(situation): vaginal itching and discharge    B-(background): 8/22/18 prescribed Keflex for impetigo.    A-(assessment): denies any pelvic pain, fevers, foul-smelling discharge, post-menopausal, denies exposure to STD, no sores present to vaginal area. Patient reports she has had recent intercourse and it was uncomfortable during and after intercourse, small amount of vaginal discharge, slightly yellow in color. Patient has not tried any OTC yeast infection medications    R-(recommendations): Home Care Advise:  For Itching:    Soak in warm water( no bubble baths)    Weal loose fitting undergarments and clothing made of cotton.    Avoid feminine scented products    Apply Clotrimazole Gyne-Lotrimin Cream to the area. Ask Pharmacist for other OTC yeast infection medications and follow the directions on the box.     Apply a cool compress to the area    For Discharge:    Clean the area frequently

## 2018-08-28 NOTE — TELEPHONE ENCOUNTER
Attempted to contact patient for more information, any OTC products patient may have tried, no answer, left voice message to call back.  Dr. Simon who saw patient is out of [facility].

## 2018-08-29 ENCOUNTER — TELEPHONE (OUTPATIENT)
Dept: LAB | Facility: SCHOOL | Age: 53
End: 2018-08-29

## 2018-08-29 ENCOUNTER — NURSE TRIAGE (OUTPATIENT)
Dept: NURSING | Facility: CLINIC | Age: 53
End: 2018-08-29

## 2018-08-29 DIAGNOSIS — B37.31 YEAST INFECTION OF THE VAGINA: Primary | ICD-10-CM

## 2018-08-29 RX ORDER — FLUCONAZOLE 150 MG/1
150 TABLET ORAL ONCE
Qty: 1 TABLET | Refills: 0 | Status: SHIPPED | OUTPATIENT
Start: 2018-08-29 | End: 2018-08-29

## 2018-08-29 NOTE — TELEPHONE ENCOUNTER
Clinic Action Needed:  Yes, callback  FNA Triage Call  Presenting Problem:    Perri is calling back today and is requesting to speak with MD Elissa Simon regarding having major vaginal itching  and vaginal swelling. Perri feels it is due to medication Keflex.  Perri feels she has a yeast infection and is requesting medication or speak with MD Elissa Simon.  Please phone Perri back at [phone].      Routed to:  MD/RN Pool  Please be sure to close this encounter once this patient's issue/question has been addressed.    Christina Vargas RN/[facility]

## 2018-08-29 NOTE — TELEPHONE ENCOUNTER
"Patient states she has been on Keflex for 6 days now and believes she has a \"flaming yeast infection\". States she is having a little yellowish discharge but the itching and burning is \"driving me mad\". States it also feels swollen in that area. Would like some diflucan called in if possible.    Also, the impentigo is not much better. The drainage has stopped some but is still itching. It has stopped spreading she thinks. She is not able to get into the dermatologist until 9/27/2018.      Cathy Lin RN    "

## 2018-08-29 NOTE — TELEPHONE ENCOUNTER
Diflucan sent to pharmacy.    If the drainage returns or persists, she should see someone at her primary clinic until she can be seen by dermatology.    Elissa Simon M.D.

## 2018-08-29 NOTE — TELEPHONE ENCOUNTER
I have attempted to contact this patient by phone with the following results: left message to return my call on answering machine.    Cathy Lin RN

## 2018-08-29 NOTE — TELEPHONE ENCOUNTER
Perri is calling back today and is requesting to speak with MD Elissa Simon regarding having major vaginal itching  and vaginal swelling. Perri feels it is due to medication Keflex.  Perri feels she has a yeast infection and is requesting medication or speak with MD Elissa Simon.  Please phone Perri back at [phone].

## 2018-08-29 NOTE — TELEPHONE ENCOUNTER
"RN to get more information.  I shouldn't need to call her.  If she feels she has a yeast infection, we can do an order for diflucan or she can try over the counter monistat.     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 If she continues to have problems, should see her provider.    Elissa Simon M.D.    "

## 2018-09-05 ENCOUNTER — OFFICE VISIT (OUTPATIENT)
Dept: FAMILY MEDICINE | Facility: CLINIC | Age: 53
End: 2018-09-05
Payer: COMMERCIAL

## 2018-09-05 VITALS
HEIGHT: 64 IN | SYSTOLIC BLOOD PRESSURE: 100 MMHG | TEMPERATURE: 98 F | RESPIRATION RATE: 16 BRPM | HEART RATE: 86 BPM | DIASTOLIC BLOOD PRESSURE: 70 MMHG | WEIGHT: 131.19 LBS | BODY MASS INDEX: 22.4 KG/M2

## 2018-09-05 DIAGNOSIS — N89.8 VAGINAL DISCHARGE: Primary | ICD-10-CM

## 2018-09-05 DIAGNOSIS — L40.9 PSORIASIS: ICD-10-CM

## 2018-09-05 LAB
SPECIMEN SOURCE: ABNORMAL
WET PREP SPEC: ABNORMAL

## 2018-09-05 PROCEDURE — 99214 OFFICE O/P EST MOD 30 MIN: CPT | Performed by: NURSE PRACTITIONER

## 2018-09-05 PROCEDURE — 87210 SMEAR WET MOUNT SALINE/INK: CPT | Performed by: NURSE PRACTITIONER

## 2018-09-05 RX ORDER — NYSTATIN AND TRIAMCINOLONE ACETONIDE 100000; 1 [USP'U]/G; MG/G
CREAM TOPICAL 2 TIMES DAILY
Qty: 60 G | Refills: 1 | Status: SHIPPED | OUTPATIENT
Start: 2018-09-05

## 2018-09-05 RX ORDER — METRONIDAZOLE 500 MG/1
500 TABLET ORAL 2 TIMES DAILY
Qty: 14 TABLET | Refills: 0 | Status: SHIPPED | OUTPATIENT
Start: 2018-09-05 | End: 2018-10-26

## 2018-09-05 RX ORDER — FLUCONAZOLE 150 MG/1
TABLET ORAL
Qty: 2 TABLET | Refills: 0 | Status: SHIPPED | OUTPATIENT
Start: 2018-09-05 | End: 2018-10-26

## 2018-09-05 RX ORDER — CLOBETASOL PROPIONATE 0.5 MG/G
CREAM TOPICAL
Qty: 30 G | Refills: 0 | Status: SHIPPED | OUTPATIENT
Start: 2018-09-05 | End: 2022-11-29

## 2018-09-05 NOTE — PATIENT INSTRUCTIONS
Mycolog cream twice daily for up to 7-10 days    Diflucan, take 1 tab today and then repeat treatment in 48-72 hrs

## 2018-09-05 NOTE — PROGRESS NOTES
"  SUBJECTIVE:   Perri Scherer is a 52 year old female who presents to clinic today for the following health issues:    Vaginal Symptoms      Duration: since around 8/29/18    Description  Itching, painful, discharge    Intensity:  moderate    Accompanying signs and symptoms (fever/dysuria/abdominal or back pain): None    History  Sexually active: yes, single partner, contraception not required  Possibility of pregnancy: No  Recent antibiotic use: YES, Keflex 500 mg 4x day.    Precipitating or alleviating factors: seen for impetigo    Therapies tried and outcome: Diflucan   Outcome: helped for a few days and seems to have spread to anal area.    Problem list and histories reviewed & adjusted, as indicated.  Additional history: as documented    Labs reviewed in EPIC    Reviewed and updated as needed this visit by clinical staff  Tobacco  Allergies  Med Hx  Surg Hx  Fam Hx  Soc Hx      Reviewed and updated as needed this visit by Provider         ROS:  Constitutional, HEENT, cardiovascular, pulmonary, gi and gu systems are negative, except as otherwise noted.    OBJECTIVE:     /70  Pulse 86  Temp 98  F (36.7  C) (Tympanic)  Resp 16  Ht 5' 3.5\" (1.613 m)  Wt 131 lb 3 oz (59.5 kg)  BMI 22.87 kg/m2  Body mass index is 22.87 kg/(m^2).  GENERAL: healthy, alert and no distress  ABDOMEN: soft, nontender   (female): normal female external genitalia, normal urethral meatus , vaginal mucosa pink, moist, well rugated and vaginal discharge - moderate, white and malodorous  SKIN: two small erythematous patches on the occipital area by hair line    Diagnostic Test Results:  none     ASSESSMENT/PLAN:     1. Vaginal discharge  -wet prep test is positive for BV and yeast infection  - fluconazole (DIFLUCAN) 150 MG tablet; Take 1 tablet today and repeat treatment in 48-72 hrs  Dispense: 2 tablet; Refill: 0  - nystatin-triamcinolone (MYCOLOG II) cream; Apply topically 2 times daily  Dispense: 60 g; Refill: 1  - Wet " prep  - metroNIDAZOLE (FLAGYL) 500 MG tablet; Take 1 tablet (500 mg) by mouth 2 times daily  Dispense: 14 tablet; Refill: 0    2. Psoriasis  -possible psoriasis, will try topical steroids, follow up with dermatologist as scheduled   - clobetasol (TEMOVATE) 0.05 % cream; Apply sparingly to affected area twice daily for 14 days.  Do not apply to face.  Dispense: 30 g; Refill: 0    See Patient Instructions    KATEY Liriano [facility]

## 2018-09-05 NOTE — MR AVS SNAPSHOT
"              After Visit Summary   9/5/2018    Perri Scherer    MRN: [id number]           Patient Information     Date Of Birth          1965        Visit Information        Provider Department      9/5/2018 7:40 AM Gabby Stratton APRN CNP [facility]        Today's Diagnoses     Vaginal discharge    -  1      Care Instructions    Mycolog cream twice daily for up to 7-10 days    Diflucan, take 1 tab today and then repeat treatment in 48-72 hrs               Follow-ups after your visit        Your next 10 appointments already scheduled     Sep 27, 2018  8:40 AM CDT   New Visit with Evelia Salgado PA-C   [facility] ([facility])    [address] [address] [phone]              Who to contact     If you have questions or need follow up information about today's clinic visit or your schedule please contact [facility] directly at [phone].  Normal or non-critical lab and imaging results will be communicated to you by MyChart, letter or phone within 4 business days after the clinic has received the results. If you do not hear from us within 7 days, please contact the clinic through [employer] or phone. If you have a critical or abnormal lab result, we will notify you by phone as soon as possible.  Submit refill requests through [employer] or call your pharmacy and they will forward the refill request to us. Please allow 3 business days for your refill to be completed.          Additional Information About Your Visit        Care EveryWhere ID     This is your Care EveryWhere ID. This could be used by other organizations to access your [facility] medical records  [id number]        Your Vitals Were     Pulse Temperature Respirations Height BMI (Body Mass Index)       86 98  F (36.7  C) (Tympanic) 16 5' 3.5\" (1.613 m) 22.87 kg/m2        Blood Pressure from Last 3 Encounters:   09/05/18 100/70   08/22/18 138/86   06/22/18 " 118/81    Weight from Last 3 Encounters:   09/05/18 131 lb 3 oz (59.5 kg)   08/22/18 137 lb (62.1 kg)   06/22/18 141 lb 1.6 oz (64 kg)              Today, you had the following     No orders found for display         Today's Medication Changes          These changes are accurate as of 9/5/18  8:10 AM.  If you have any questions, ask your nurse or doctor.               Start taking these medicines.        Dose/Directions    fluconazole 150 MG tablet   Commonly known as:  DIFLUCAN   Used for:  Vaginal discharge   Started by:  Gbaby Stratton APRN CNP        Take 1 tablet today and repeat treatment in 48-72 hrs   Quantity:  2 tablet   Refills:  0       nystatin-triamcinolone cream   Commonly known as:  MYCOLOG II   Used for:  Vaginal discharge   Started by:  Gabby Stratton APRN CNP        Apply topically 2 times daily   Quantity:  60 g   Refills:  1            Where to get your medicines      These medications were sent to [facility] [address] [place] [address] AT [address] [address]     Phone:  [phone]     fluconazole 150 MG tablet    nystatin-triamcinolone cream                Primary Care Provider Office Phone # Fax #    Kimmy Schroeder KATEY Peter [phone] [address] [facility] [address]        Equal Access to Services     JEAN BECKMAN AH: Hadii raul richmondo Soluz elena, waaxda luqadaha, qaybta kaalmada adeegyada, cynthia weber. So [facility] [phone].    ATENCIÓN: Si habla español, tiene a david disposición servicios gratuitos de asistencia lingüística. Tracy cheng [phone].    We comply with applicable federal civil rights laws and [place] laws. We do not discriminate on the basis of race, color, national origin, age, disability, sex, sexual orientation, or gender identity.            Thank you!     Thank you for choosing [facility]  for your care.  Our goal is always to provide you with excellent care. Hearing back from our patients is one way we can continue to improve our services. Please take a few minutes to complete the written survey that you may receive in the mail after your visit with us. Thank you!             Your Updated Medication List - Protect others around you: Learn how to safely use, store and throw away your medicines at www.disposemymeds.org.          This list is accurate as of 9/5/18  8:10 AM.  Always use your most recent med list.                   Brand Name Dispense Instructions for use Diagnosis    butalbital-acetaminophen-caffeine -40 MG per tablet    FIORICET/ESGIC    15 tablet    Take 1 tablet by mouth every 6 hours as needed    Other migraine without status migrainosus, not intractable       fluconazole 150 MG tablet    DIFLUCAN    2 tablet    Take 1 tablet today and repeat treatment in 48-72 hrs    Vaginal discharge       levothyroxine 100 MCG tablet    SYNTHROID/LEVOTHROID    90 tablet    TAKE 1 TABLET(100 MCG) BY MOUTH DAILY    Hypothyroidism, unspecified type       lisinopril 20 MG tablet    PRINIVIL/ZESTRIL    90 tablet    Take 1 tablet (20 mg) by mouth daily    Benign essential hypertension       nystatin-triamcinolone cream    MYCOLOG II    60 g    Apply topically 2 times daily    Vaginal discharge       SUMAtriptan 50 MG tablet    IMITREX    9 tablet    Take 1 tablet (50 mg) by mouth at onset of headache for migraine May repeat in 2 hours. Max 4 tablets/24 hours.

## 2018-09-05 NOTE — NURSING NOTE
"Chief Complaint   Patient presents with     Derm Problem     Seen on 8/22/18 for impetigo on scalp area.  Is not getting any better.     Vaginal Problem     Developed yeast problem around 8/29/18.       Initial /70  Pulse 86  Temp 98  F (36.7  C) (Tympanic)  Resp 16  Ht 5' 3.5\" (1.613 m)  Wt 131 lb 3 oz (59.5 kg)  BMI 22.87 kg/m2 Estimated body mass index is 22.87 kg/(m^2) as calculated from the following:    Height as of this encounter: 5' 3.5\" (1.613 m).    Weight as of this encounter: 131 lb 3 oz (59.5 kg).    Medication Reconciliation:  complete    Eun Trujillo CMA (AAMA)  "

## 2018-09-27 ENCOUNTER — TELEPHONE (OUTPATIENT)
Dept: DERMATOLOGY | Facility: CLINIC | Age: 53
End: 2018-09-27

## 2018-09-27 ENCOUNTER — OFFICE VISIT (OUTPATIENT)
Dept: DERMATOLOGY | Facility: CLINIC | Age: 53
End: 2018-09-27
Payer: COMMERCIAL

## 2018-09-27 VITALS — HEART RATE: 80 BPM | OXYGEN SATURATION: 99 % | SYSTOLIC BLOOD PRESSURE: 149 MMHG | DIASTOLIC BLOOD PRESSURE: 94 MMHG

## 2018-09-27 DIAGNOSIS — L71.0 PERIORAL DERMATITIS: Primary | ICD-10-CM

## 2018-09-27 DIAGNOSIS — L71.0 DERMATITIS, PERIORAL: Primary | ICD-10-CM

## 2018-09-27 DIAGNOSIS — L30.9 DERMATITIS: ICD-10-CM

## 2018-09-27 LAB
FUNGUS SPEC CULT: NORMAL
FUNGUS SPEC CULT: NORMAL
SPECIMEN SOURCE: NORMAL

## 2018-09-27 PROCEDURE — 87102 FUNGUS ISOLATION CULTURE: CPT | Performed by: PHYSICIAN ASSISTANT

## 2018-09-27 PROCEDURE — 87101 SKIN FUNGI CULTURE: CPT | Performed by: PHYSICIAN ASSISTANT

## 2018-09-27 PROCEDURE — 87070 CULTURE OTHR SPECIMN AEROBIC: CPT | Performed by: PHYSICIAN ASSISTANT

## 2018-09-27 PROCEDURE — 87186 SC STD MICRODIL/AGAR DIL: CPT | Performed by: PHYSICIAN ASSISTANT

## 2018-09-27 PROCEDURE — 87077 CULTURE AEROBIC IDENTIFY: CPT | Performed by: PHYSICIAN ASSISTANT

## 2018-09-27 PROCEDURE — 99213 OFFICE O/P EST LOW 20 MIN: CPT | Performed by: PHYSICIAN ASSISTANT

## 2018-09-27 RX ORDER — PIMECROLIMUS 10 MG/G
CREAM TOPICAL
Qty: 30 G | Refills: 1 | Status: SHIPPED | OUTPATIENT
Start: 2018-09-27 | End: 2018-10-26 | Stop reason: ALTCHOICE

## 2018-09-27 RX ORDER — DOXYCYCLINE 100 MG/1
100 CAPSULE ORAL 2 TIMES DAILY WITH MEALS
Qty: 28 CAPSULE | Refills: 1 | Status: SHIPPED | OUTPATIENT
Start: 2018-09-27 | End: 2018-10-11

## 2018-09-27 NOTE — PROGRESS NOTES
Perri Scherer is a 52 year old year old female patient here today for rash on scalp and face.  Patient reports that rash initially started as an itchy scaly spot on back of scalp. She notes that she has been getting some drainage. She did take keflex for 4 days but did not see any improvements. She then changed to clobetasol cream but hasn't felt like it has made much of a difference. She notes that she gets a rash in the fall around mouth that will itch occasionally.  Patient has no other skin complaints today.  Remainder of the HPI, Meds, PMH, Allergies, FH, and SH was reviewed in chart.    Past Medical History:   Diagnosis Date     Smoking        Past Surgical History:   Procedure Laterality Date     TUBAL LIGATION  1992        Family History   Problem Relation Age of Onset     Diabetes Mother      C.A.D. Father      Alzheimer Disease Maternal Grandfather        Social History     Social History     Marital status:      Spouse name: N/A     Number of children: N/A     Years of education: N/A     Occupational History     Not on file.     Social History Main Topics     Smoking status: Former Smoker     Types: Cigarettes     Quit date: 11/1/2017     Smokeless tobacco: Never Used      Comment:   otherwise E-cigs only     Alcohol use Yes      Comment: occasional     Drug use: No     Sexual activity: Yes     Partners: Male     Birth control/ protection: Surgical     Other Topics Concern     Parent/Sibling W/ Cabg, Mi Or Angioplasty Before 65f 55m? No     Social History Narrative       Outpatient Encounter Prescriptions as of 9/27/2018   Medication Sig Dispense Refill     doxycycline monohydrate 100 MG capsule Take 1 capsule (100 mg) by mouth 2 times daily (with meals) for 14 days 28 capsule 1     levothyroxine (SYNTHROID/LEVOTHROID) 100 MCG tablet TAKE 1 TABLET(100 MCG) BY MOUTH DAILY 90 tablet 3     lisinopril (PRINIVIL/ZESTRIL) 20 MG tablet Take 1 tablet (20 mg) by mouth daily 90 tablet 3     pimecrolimus  (ELIDEL) 1 % cream Apply twice daily to rash around mouth. 30 g 1     butalbital-acetaminophen-caffeine (FIORICET/ESGIC) -40 MG per tablet Take 1 tablet by mouth every 6 hours as needed (Patient not taking: Reported on 6/22/2018) 15 tablet 0     clobetasol (TEMOVATE) 0.05 % cream Apply sparingly to affected area twice daily for 14 days.  Do not apply to face. (Patient not taking: Reported on 9/27/2018) 30 g 0     fluconazole (DIFLUCAN) 150 MG tablet Take 1 tablet today and repeat treatment in 48-72 hrs (Patient not taking: Reported on 9/27/2018) 2 tablet 0     metroNIDAZOLE (FLAGYL) 500 MG tablet Take 1 tablet (500 mg) by mouth 2 times daily (Patient not taking: Reported on 9/27/2018) 14 tablet 0     nystatin-triamcinolone (MYCOLOG II) cream Apply topically 2 times daily (Patient not taking: Reported on 9/27/2018) 60 g 1     SUMAtriptan (IMITREX) 50 MG tablet Take 1 tablet (50 mg) by mouth at onset of headache for migraine May repeat in 2 hours. Max 4 tablets/24 hours. (Patient not taking: Reported on 6/22/2018) 9 tablet 1     No facility-administered encounter medications on file as of 9/27/2018.              Review Of Systems  Skin: As above  Eyes: negative  Ears/Nose/Throat: negative  Respiratory: No shortness of breath, dyspnea on exertion, cough, or hemoptysis  Cardiovascular: negative  Gastrointestinal: negative  Genitourinary: negative  Musculoskeletal: negative  Neurologic: negative  Psychiatric: negative  Hematologic/Lymphatic/Immunologic: negative  Endocrine: negative      O:   NAD, WDWN, Alert & Oriented, Mood & Affect wnl, Vitals stable   Here today alone   BP (!) 149/94 (BP Location: Left arm, Patient Position: Sitting, Cuff Size: Adult Regular)  Pulse 80  SpO2 99%   General appearance normal   Vitals stable   Alert, oriented and in no acute distress     Yellow crusted plaque with erythematous base on occipital scalp   Mild scaly papules perioral     Eyes: Conjunctivae/lids:Normal     ENT: Lips:  normal    MSK:Normal    Pulm: Breathing Normal    Neuro/Psych: Orientation:Normal; Mood/Affect:Normal  A/P:  1. Impetignized Dermatitis on scalp  Culture taken.   Start doxycycline twice daily for 2 weeks.   Can continue clobetasol, also offered solution, but patient refused.  2. Perioral Dermatitis   Apply elidel cream twice daily as needed.   Use mild cleanser and moisturizer.   Return in one month.   Patient to follow up with Primary Care provider regarding elevated blood pressure.

## 2018-09-27 NOTE — TELEPHONE ENCOUNTER
Prior Authorization Retail Medication Request    Medication/Dose: Elidel 1% cream 30 gm  ICD code (if different than what is on RX):    Previously Tried and Failed:    Rationale:      Insurance Name:  [employer]  Insurance ID:        Pharmacy Information (if different than what is on RX)  Name:  [facility] [place]  Phone:  [phone]

## 2018-09-27 NOTE — TELEPHONE ENCOUNTER
Central Prior Authorization Team   Phone: [phone]    PA Initiation    Medication: Elidel 1% cream  Insurance Company: [employer] - Phone [phone] Fax [phone]  Pharmacy Filling the Rx: [email or web address] [address] [place] [address] AVE AT [address]  Filling Pharmacy Phone: [phone]  Filling Pharmacy Fax:    Start Date: 9/27/2018    [id number]

## 2018-09-27 NOTE — MR AVS SNAPSHOT
"              After Visit Summary   9/27/2018    Perri Scherer    MRN: [id number]           Patient Information     Date Of Birth          1965        Visit Information        Provider Department      9/27/2018 8:40 AM Evelia Salgado PA-C [facility]        Today's Diagnoses     Perioral dermatitis    -  1    Dermatitis           Follow-ups after your visit        Your next 10 appointments already scheduled     Oct 26, 2018 10:40 AM CDT   Return Visit with Evelia Salgado PA-C   [facility]    [address] [facility] [address]   [phone]              Who to contact     If you have questions or need follow up information about today's clinic visit or your schedule please contact [facility] directly at [phone].  Normal or non-critical lab and imaging results will be communicated to you by MyChart, letter or phone within 4 business days after the clinic has received the results. If you do not hear from us within 7 days, please contact the clinic through MyChart or phone. If you have a critical or abnormal lab result, we will notify you by phone as soon as possible.  Submit refill requests through [employer] or call your pharmacy and they will forward the refill request to us. Please allow 3 business days for your refill to be completed.          Additional Information About Your Visit        MyChart Information     [employer] lets you send messages to your doctor, view your test results, renew your prescriptions, schedule appointments and more. To sign up, go to [email or web address] . Click on \"Log in\" on the left side of the screen, which will take you to the Welcome page. Then click on \"Sign up Now\" on the right side of the page.     You will be asked to enter the access code listed below, as well as some personal information. Please follow the directions to create your username and password.     Your access " code is: [email or web address]  Expires: 2018 10:22 AM     Your access code will  in 90 days. If you need help or a new code, please call your [facility] clinic or [phone].        Care EveryWhere ID     This is your Care EveryWhere ID. This could be used by other organizations to access your [facility] medical records  [id number]        Your Vitals Were     Pulse Pulse Oximetry                80 99%           Blood Pressure from Last 3 Encounters:   18 (!) 149/94   18 100/70   18 138/86    Weight from Last 3 Encounters:   18 59.5 kg (131 lb 3 oz)   18 62.1 kg (137 lb)   18 64 kg (141 lb 1.6 oz)              We Performed the Following     Fungus Culture, non-blood     Wound Culture Aerobic Bacterial          Today's Medication Changes          These changes are accurate as of 18 10:22 AM.  If you have any questions, ask your nurse or doctor.               Start taking these medicines.        Dose/Directions    doxycycline monohydrate 100 MG capsule   Used for:  Dermatitis   Started by:  Evelia Salgado PA-C        Dose:  100 mg   Take 1 capsule (100 mg) by mouth 2 times daily (with meals) for 14 days   Quantity:  28 capsule   Refills:  1       pimecrolimus 1 % cream   Commonly known as:  ELIDEL   Used for:  Perioral dermatitis   Started by:  Evelia Salgado PA-C        Apply twice daily to rash around mouth.   Quantity:  30 g   Refills:  1            Where to get your medicines      These medications were sent to [facility] [id number] - [address] AT [facility] OF [address] [place] [address]  [address] [facility] [address]     Phone:  [phone]     doxycycline monohydrate 100 MG capsule    pimecrolimus 1 % cream                Primary Care Provider Office Phone # Fax #    KimmyKATEY Soto [facility] [phone] [address] [facility] [address]        Equal Access to Services     JEAN BECKMAN AH: Elif  raul Bethea, wasilviada luqadaha, qaybta kaalnaveen morales, cynthia elizabethin hayaabijal serranocielo paolarhonda laalicebijal gus. So [facility] [phone].    ATENCIÓN: Si habla lis, tiene a david disposición servicios gratuitos de asistencia lingüística. Tracy al [phone].    We comply with applicable federal civil rights laws and [place] laws. We do not discriminate on the basis of race, color, national origin, age, disability, sex, sexual orientation, or gender identity.            Thank you!     Thank you for choosing [facility]  for your care. Our goal is always to provide you with excellent care. Hearing back from our patients is one way we can continue to improve our services. Please take a few minutes to complete the written survey that you may receive in the mail after your visit with us. Thank you!             Your Updated Medication List - Protect others around you: Learn how to safely use, store and throw away your medicines at www.disposemymeds.org.          This list is accurate as of 9/27/18 10:22 AM.  Always use your most recent med list.                   Brand Name Dispense Instructions for use Diagnosis    butalbital-acetaminophen-caffeine -40 MG per tablet    FIORICET/ESGIC    15 tablet    Take 1 tablet by mouth every 6 hours as needed    Other migraine without status migrainosus, not intractable       clobetasol 0.05 % cream    TEMOVATE    30 g    Apply sparingly to affected area twice daily for 14 days.  Do not apply to face.    Psoriasis       doxycycline monohydrate 100 MG capsule     28 capsule    Take 1 capsule (100 mg) by mouth 2 times daily (with meals) for 14 days    Dermatitis       fluconazole 150 MG tablet    DIFLUCAN    2 tablet    Take 1 tablet today and repeat treatment in 48-72 hrs    Vaginal discharge       levothyroxine 100 MCG tablet    SYNTHROID/LEVOTHROID    90 tablet    TAKE 1 TABLET(100 MCG) BY MOUTH DAILY    Hypothyroidism, unspecified type       lisinopril 20 MG  tablet    PRINIVIL/ZESTRIL    90 tablet    Take 1 tablet (20 mg) by mouth daily    Benign essential hypertension       metroNIDAZOLE 500 MG tablet    FLAGYL    14 tablet    Take 1 tablet (500 mg) by mouth 2 times daily    Vaginal discharge       nystatin-triamcinolone cream    MYCOLOG II    60 g    Apply topically 2 times daily    Vaginal discharge       pimecrolimus 1 % cream    ELIDEL    30 g    Apply twice daily to rash around mouth.    Perioral dermatitis       SUMAtriptan 50 MG tablet    IMITREX    9 tablet    Take 1 tablet (50 mg) by mouth at onset of headache for migraine May repeat in 2 hours. Max 4 tablets/24 hours.

## 2018-09-27 NOTE — NURSING NOTE
"Initial BP (!) 149/94 (BP Location: Left arm, Patient Position: Sitting, Cuff Size: Adult Regular)  Pulse 80  SpO2 99% Estimated body mass index is 22.87 kg/(m^2) as calculated from the following:    Height as of 9/5/18: 1.613 m (5' 3.5\").    Weight as of 9/5/18: 59.5 kg (131 lb 3 oz). .      "

## 2018-09-27 NOTE — LETTER
9/27/2018         RE: Perri Scherer  [address] [facility] [address]        Dear Colleague,    Thank you for referring your patient, Perri Scherer, to the [facility]. Please see a copy of my visit note below.    Perri Scherer is a 52 year old year old female patient here today for rash on scalp and face.  Patient reports that rash initially started as an itchy scaly spot on back of scalp. She notes that she has been getting some drainage. She did take keflex for 4 days but did not see any improvements. She then changed to clobetasol cream but hasn't felt like it has made much of a difference. She notes that she gets a rash in the fall around mouth that will itch occasionally.  Patient has no other skin complaints today.  Remainder of the HPI, Meds, PMH, Allergies, FH, and SH was reviewed in chart.    Past Medical History:   Diagnosis Date     Smoking        Past Surgical History:   Procedure Laterality Date     TUBAL LIGATION  1992        Family History   Problem Relation Age of Onset     Diabetes Mother      C.A.D. Father      Alzheimer Disease Maternal Grandfather        Social History     Social History     Marital status:      Spouse name: N/A     Number of children: N/A     Years of education: N/A     Occupational History     Not on file.     Social History Main Topics     Smoking status: Former Smoker     Types: Cigarettes     Quit date: 11/1/2017     Smokeless tobacco: Never Used      Comment:   otherwise E-cigs only     Alcohol use Yes      Comment: occasional     Drug use: No     Sexual activity: Yes     Partners: Male     Birth control/ protection: Surgical     Other Topics Concern     Parent/Sibling W/ Cabg, Mi Or Angioplasty Before 65f 55m? No     Social History Narrative       Outpatient Encounter Prescriptions as of 9/27/2018   Medication Sig Dispense Refill     doxycycline monohydrate 100 MG capsule Take 1 capsule (100 mg) by mouth 2 times daily (with meals) for 14  days 28 capsule 1     levothyroxine (SYNTHROID/LEVOTHROID) 100 MCG tablet TAKE 1 TABLET(100 MCG) BY MOUTH DAILY 90 tablet 3     lisinopril (PRINIVIL/ZESTRIL) 20 MG tablet Take 1 tablet (20 mg) by mouth daily 90 tablet 3     pimecrolimus (ELIDEL) 1 % cream Apply twice daily to rash around mouth. 30 g 1     butalbital-acetaminophen-caffeine (FIORICET/ESGIC) -40 MG per tablet Take 1 tablet by mouth every 6 hours as needed (Patient not taking: Reported on 6/22/2018) 15 tablet 0     clobetasol (TEMOVATE) 0.05 % cream Apply sparingly to affected area twice daily for 14 days.  Do not apply to face. (Patient not taking: Reported on 9/27/2018) 30 g 0     fluconazole (DIFLUCAN) 150 MG tablet Take 1 tablet today and repeat treatment in 48-72 hrs (Patient not taking: Reported on 9/27/2018) 2 tablet 0     metroNIDAZOLE (FLAGYL) 500 MG tablet Take 1 tablet (500 mg) by mouth 2 times daily (Patient not taking: Reported on 9/27/2018) 14 tablet 0     nystatin-triamcinolone (MYCOLOG II) cream Apply topically 2 times daily (Patient not taking: Reported on 9/27/2018) 60 g 1     SUMAtriptan (IMITREX) 50 MG tablet Take 1 tablet (50 mg) by mouth at onset of headache for migraine May repeat in 2 hours. Max 4 tablets/24 hours. (Patient not taking: Reported on 6/22/2018) 9 tablet 1     No facility-administered encounter medications on file as of 9/27/2018.              Review Of Systems  Skin: As above  Eyes: negative  Ears/Nose/Throat: negative  Respiratory: No shortness of breath, dyspnea on exertion, cough, or hemoptysis  Cardiovascular: negative  Gastrointestinal: negative  Genitourinary: negative  Musculoskeletal: negative  Neurologic: negative  Psychiatric: negative  Hematologic/Lymphatic/Immunologic: negative  Endocrine: negative      O:   NAD, WDWN, Alert & Oriented, Mood & Affect wnl, Vitals stable   Here today alone   BP (!) 149/94 (BP Location: Left arm, Patient Position: Sitting, Cuff Size: Adult Regular)  Pulse 80  SpO2  99%   General appearance normal   Vitals stable   Alert, oriented and in no acute distress     Yellow crusted plaque with erythematous base on occipital scalp   Mild scaly papules perioral     Eyes: Conjunctivae/lids:Normal     ENT: Lips: normal    MSK:Normal    Pulm: Breathing Normal    Neuro/Psych: Orientation:Normal; Mood/Affect:Normal  A/P:  1. Impetignized Dermatitis on scalp  Culture taken.   Start doxycycline twice daily for 2 weeks.   Can continue clobetasol, also offered solution, but patient refused.  2. Perioral Dermatitis   Apply elidel cream twice daily as needed.   Use mild cleanser and moisturizer.   Return in one month.   Patient to follow up with Primary Care provider regarding elevated blood pressure.        Again, thank you for allowing me to participate in the care of your patient.        Sincerely,        Evelia Ryder PA-C

## 2018-09-28 RX ORDER — TACROLIMUS 1 MG/G
OINTMENT TOPICAL
Qty: 30 G | Refills: 1 | Status: SHIPPED | OUTPATIENT
Start: 2018-09-28

## 2018-09-28 NOTE — TELEPHONE ENCOUNTER
Unable to reach patient. Message left that medication ordered not covered and insurance denied PA, so a substitute medication was sent to pharmacy that hopefully, insurance will cover. Call if questions.  Dyan Gasca RN

## 2018-09-28 NOTE — TELEPHONE ENCOUNTER
PRIOR AUTHORIZATION DENIED    Medication: Elidel 1% cream    Denial Date: 9/28/2018    Denial Rational:  Medication is excluded from coverage.          Appeal Information: n/a

## 2018-09-29 LAB
BACTERIA SPEC CULT: ABNORMAL
Lab: ABNORMAL
SPECIMEN SOURCE: ABNORMAL

## 2018-10-25 LAB
BACTERIA SPEC CULT: NORMAL
Lab: NORMAL
SPECIMEN SOURCE: NORMAL

## 2018-10-26 ENCOUNTER — OFFICE VISIT (OUTPATIENT)
Dept: DERMATOLOGY | Facility: CLINIC | Age: 53
End: 2018-10-26
Payer: COMMERCIAL

## 2018-10-26 VITALS — SYSTOLIC BLOOD PRESSURE: 135 MMHG | DIASTOLIC BLOOD PRESSURE: 82 MMHG | OXYGEN SATURATION: 99 % | HEART RATE: 62 BPM

## 2018-10-26 DIAGNOSIS — L40.0 PLAQUE PSORIASIS: Primary | ICD-10-CM

## 2018-10-26 PROCEDURE — 99213 OFFICE O/P EST LOW 20 MIN: CPT | Performed by: PHYSICIAN ASSISTANT

## 2018-10-26 NOTE — LETTER
10/26/2018         RE: Perri Scherer  [address] [facility] [address]        Dear Colleague,    Thank you for referring your patient, Perri Scherer, to the [facility]. Please see a copy of my visit note below.    Perri Scherer is a 53 year old year old female patient here today for recheck scalp. Patient notes rash has improved on scalp. Not draining but still itchy. Notes new itchy scaly areas on elbows.  Patient has no other skin complaints today.  Remainder of the HPI, Meds, PMH, Allergies, FH, and SH was reviewed in chart.    Past Medical History:   Diagnosis Date     Smoking        Past Surgical History:   Procedure Laterality Date     TUBAL LIGATION  1992        Family History   Problem Relation Age of Onset     Diabetes Mother      C.A.D. Father      Alzheimer Disease Maternal Grandfather      Melanoma No family hx of        Social History     Social History     Marital status:      Spouse name: N/A     Number of children: N/A     Years of education: N/A     Occupational History     Not on file.     Social History Main Topics     Smoking status: Former Smoker     Types: Cigarettes     Quit date: 11/1/2017     Smokeless tobacco: Never Used      Comment:   otherwise E-cigs only     Alcohol use Yes      Comment: occasional     Drug use: No     Sexual activity: Yes     Partners: Male     Birth control/ protection: Surgical     Other Topics Concern     Parent/Sibling W/ Cabg, Mi Or Angioplasty Before 65f 55m? No     Social History Narrative       Outpatient Encounter Prescriptions as of 10/26/2018   Medication Sig Dispense Refill     clobetasol (TEMOVATE) 0.05 % cream Apply sparingly to affected area twice daily for 14 days.  Do not apply to face. 30 g 0     levothyroxine (SYNTHROID/LEVOTHROID) 100 MCG tablet TAKE 1 TABLET(100 MCG) BY MOUTH DAILY 90 tablet 3     tacrolimus (PROTOPIC) 0.1 % ointment Apply twice daily as needed. 30 g 1     butalbital-acetaminophen-caffeine  (FIORICET/ESGIC) -40 MG per tablet Take 1 tablet by mouth every 6 hours as needed (Patient not taking: Reported on 6/22/2018) 15 tablet 0     lisinopril (PRINIVIL/ZESTRIL) 20 MG tablet Take 1 tablet (20 mg) by mouth daily (Patient not taking: Reported on 10/26/2018) 90 tablet 3     nystatin-triamcinolone (MYCOLOG II) cream Apply topically 2 times daily (Patient not taking: Reported on 9/27/2018) 60 g 1     SUMAtriptan (IMITREX) 50 MG tablet Take 1 tablet (50 mg) by mouth at onset of headache for migraine May repeat in 2 hours. Max 4 tablets/24 hours. (Patient not taking: Reported on 6/22/2018) 9 tablet 1     [DISCONTINUED] fluconazole (DIFLUCAN) 150 MG tablet Take 1 tablet today and repeat treatment in 48-72 hrs (Patient not taking: Reported on 9/27/2018) 2 tablet 0     [DISCONTINUED] metroNIDAZOLE (FLAGYL) 500 MG tablet Take 1 tablet (500 mg) by mouth 2 times daily (Patient not taking: Reported on 9/27/2018) 14 tablet 0     [DISCONTINUED] pimecrolimus (ELIDEL) 1 % cream Apply twice daily to rash around mouth. 30 g 1     No facility-administered encounter medications on file as of 10/26/2018.              Review Of Systems  Skin: As above  Eyes: negative  Ears/Nose/Throat: negative  Respiratory: No shortness of breath, dyspnea on exertion, cough, or hemoptysis  Cardiovascular: negative  Gastrointestinal: negative  Genitourinary: negative  Musculoskeletal: negative  Neurologic: negative  Psychiatric: negative  Hematologic/Lymphatic/Immunologic: negative  Endocrine: negative      O:   NAD, WDWN, Alert & Oriented, Mood & Affect wnl, Vitals stable   Here today alone   /82  Pulse 62  SpO2 99%   General appearance normal   Vitals stable   Alert, oriented and in no acute distress     Mild pink scaly plaque on occipital scalp  Thin scaly plaque on bilateral elbows    Eyes: Conjunctivae/lids:Normal     ENT: Lips: normal    MSK:Normal    Pulm: Breathing Normal    Neuro/Psych: Orientation:Normal;  Mood/Affect:Normal  A/P:  1. Plaque Psoriasis on scalp and elbows  Discussed pathophysiology, informational handout was given.   Continue clobetasol twice daily for two weeks then 1-2 times weekly as needed.   Consider intralesional steroid injections.   Skin care regimen reviewed with patient: Eliminate harsh soaps, i.e. Dial, zest, irsih spring; Mild soaps such as Cetaphil or Dove sensitive skin, avoid hot or cold showers, aggressive use of emollients including vanicream, cetaphil or cerave discussed with patient.  Return to clinic as needed.       Again, thank you for allowing me to participate in the care of your patient.        Sincerely,        Evelia Ryder PA-C

## 2018-10-26 NOTE — MR AVS SNAPSHOT
"              After Visit Summary   10/26/2018    Perri Scherer    MRN: [id number]           Patient Information     Date Of Birth          1965        Visit Information        Provider Department      10/26/2018 10:40 AM Evelia Salgado PA-C [facility]        Today's Diagnoses     Plaque psoriasis    -  1       Follow-ups after your visit        Who to contact     If you have questions or need follow up information about today's clinic visit or your schedule please contact [facility] directly at [phone].  Normal or non-critical lab and imaging results will be communicated to you by [employer], letter or phone within 4 business days after the clinic has received the results. If you do not hear from us within 7 days, please contact the clinic through VISENZEhart or phone. If you have a critical or abnormal lab result, we will notify you by phone as soon as possible.  Submit refill requests through [employer] or call your pharmacy and they will forward the refill request to us. Please allow 3 business days for your refill to be completed.          Additional Information About Your Visit        MyChart Information     [employer] lets you send messages to your doctor, view your test results, renew your prescriptions, schedule appointments and more. To sign up, go to [email or web address] . Click on \"Log in\" on the left side of the screen, which will take you to the Welcome page. Then click on \"Sign up Now\" on the right side of the page.     You will be asked to enter the access code listed below, as well as some personal information. Please follow the directions to create your username and password.     Your access code is: [email or web address]  Expires: 2018 10:22 AM     Your access code will  in 90 days. If you need help or a new code, please call your [facility] or [phone].        Care EveryWhere ID     This is your Care EveryWhere ID. This could be used by other " organizations to access your [place] medical records  [id number]        Your Vitals Were     Pulse Pulse Oximetry                62 99%           Blood Pressure from Last 3 Encounters:   10/26/18 135/82   09/27/18 (!) 149/94   09/05/18 100/70    Weight from Last 3 Encounters:   09/05/18 131 lb 3 oz (59.5 kg)   08/22/18 137 lb (62.1 kg)   06/22/18 141 lb 1.6 oz (64 kg)              Today, you had the following     No orders found for display         Today's Medication Changes          These changes are accurate as of 10/26/18 11:03 AM.  If you have any questions, ask your nurse or doctor.               Stop taking these medicines if you haven't already. Please contact your care team if you have questions.     pimecrolimus 1 % cream   Commonly known as:  ELIDEL   Stopped by:  Evelia Salgado PA-C                    Primary Care Provider Office Phone # Fax #    Kimmy Roldant, APRN [facility] [phone] [address] [facility] [address]        Equal Access to Services     ROHITH BECKMAN : Hadii aad ku hadasho Soomaali, waaxda luqadaha, qaybta kaalmada adeegyasteven, cynthia santana . So [facility] [phone].    ATENCIÓN: Si habla español, tiene a david disposición servicios gratuitos de asistencia lingüística. Tracy al [phone].    We comply with applicable federal civil rights laws and [place] laws. We do not discriminate on the basis of race, color, national origin, age, disability, sex, sexual orientation, or gender identity.            Thank you!     Thank you for choosing [facility]  for your care. Our goal is always to provide you with excellent care. Hearing back from our patients is one way we can continue to improve our services. Please take a few minutes to complete the written survey that you may receive in the mail after your visit with us. Thank you!             Your Updated Medication List - Protect others around you: Learn how to safely use,  store and throw away your medicines at www.disposemymeds.org.          This list is accurate as of 10/26/18 11:03 AM.  Always use your most recent med list.                   Brand Name Dispense Instructions for use Diagnosis    butalbital-acetaminophen-caffeine -40 MG per tablet    FIORICET/ESGIC    15 tablet    Take 1 tablet by mouth every 6 hours as needed    Other migraine without status migrainosus, not intractable       clobetasol 0.05 % cream    TEMOVATE    30 g    Apply sparingly to affected area twice daily for 14 days.  Do not apply to face.    Psoriasis       levothyroxine 100 MCG tablet    SYNTHROID/LEVOTHROID    90 tablet    TAKE 1 TABLET(100 MCG) BY MOUTH DAILY    Hypothyroidism, unspecified type       lisinopril 20 MG tablet    PRINIVIL/ZESTRIL    90 tablet    Take 1 tablet (20 mg) by mouth daily    Benign essential hypertension       nystatin-triamcinolone cream    MYCOLOG II    60 g    Apply topically 2 times daily    Vaginal discharge       SUMAtriptan 50 MG tablet    IMITREX    9 tablet    Take 1 tablet (50 mg) by mouth at onset of headache for migraine May repeat in 2 hours. Max 4 tablets/24 hours.        tacrolimus 0.1 % ointment    PROTOPIC    30 g    Apply twice daily as needed.    Dermatitis, perioral

## 2018-10-26 NOTE — PROGRESS NOTES
Perri Scherer is a 53 year old year old female patient here today for recheck scalp. Patient notes rash has improved on scalp. Not draining but still itchy. Notes new itchy scaly areas on elbows.  Patient has no other skin complaints today.  Remainder of the HPI, Meds, PMH, Allergies, FH, and SH was reviewed in chart.    Past Medical History:   Diagnosis Date     Smoking        Past Surgical History:   Procedure Laterality Date     TUBAL LIGATION  1992        Family History   Problem Relation Age of Onset     Diabetes Mother      C.A.D. Father      Alzheimer Disease Maternal Grandfather      Melanoma No family hx of        Social History     Social History     Marital status:      Spouse name: N/A     Number of children: N/A     Years of education: N/A     Occupational History     Not on file.     Social History Main Topics     Smoking status: Former Smoker     Types: Cigarettes     Quit date: 11/1/2017     Smokeless tobacco: Never Used      Comment:   otherwise E-cigs only     Alcohol use Yes      Comment: occasional     Drug use: No     Sexual activity: Yes     Partners: Male     Birth control/ protection: Surgical     Other Topics Concern     Parent/Sibling W/ Cabg, Mi Or Angioplasty Before 65f 55m? No     Social History Narrative       Outpatient Encounter Prescriptions as of 10/26/2018   Medication Sig Dispense Refill     clobetasol (TEMOVATE) 0.05 % cream Apply sparingly to affected area twice daily for 14 days.  Do not apply to face. 30 g 0     levothyroxine (SYNTHROID/LEVOTHROID) 100 MCG tablet TAKE 1 TABLET(100 MCG) BY MOUTH DAILY 90 tablet 3     tacrolimus (PROTOPIC) 0.1 % ointment Apply twice daily as needed. 30 g 1     butalbital-acetaminophen-caffeine (FIORICET/ESGIC) -40 MG per tablet Take 1 tablet by mouth every 6 hours as needed (Patient not taking: Reported on 6/22/2018) 15 tablet 0     lisinopril (PRINIVIL/ZESTRIL) 20 MG tablet Take 1 tablet (20 mg) by mouth daily (Patient not taking:  Reported on 10/26/2018) 90 tablet 3     nystatin-triamcinolone (MYCOLOG II) cream Apply topically 2 times daily (Patient not taking: Reported on 9/27/2018) 60 g 1     SUMAtriptan (IMITREX) 50 MG tablet Take 1 tablet (50 mg) by mouth at onset of headache for migraine May repeat in 2 hours. Max 4 tablets/24 hours. (Patient not taking: Reported on 6/22/2018) 9 tablet 1     [DISCONTINUED] fluconazole (DIFLUCAN) 150 MG tablet Take 1 tablet today and repeat treatment in 48-72 hrs (Patient not taking: Reported on 9/27/2018) 2 tablet 0     [DISCONTINUED] metroNIDAZOLE (FLAGYL) 500 MG tablet Take 1 tablet (500 mg) by mouth 2 times daily (Patient not taking: Reported on 9/27/2018) 14 tablet 0     [DISCONTINUED] pimecrolimus (ELIDEL) 1 % cream Apply twice daily to rash around mouth. 30 g 1     No facility-administered encounter medications on file as of 10/26/2018.              Review Of Systems  Skin: As above  Eyes: negative  Ears/Nose/Throat: negative  Respiratory: No shortness of breath, dyspnea on exertion, cough, or hemoptysis  Cardiovascular: negative  Gastrointestinal: negative  Genitourinary: negative  Musculoskeletal: negative  Neurologic: negative  Psychiatric: negative  Hematologic/Lymphatic/Immunologic: negative  Endocrine: negative      O:   NAD, WDWN, Alert & Oriented, Mood & Affect wnl, Vitals stable   Here today alone   /82  Pulse 62  SpO2 99%   General appearance normal   Vitals stable   Alert, oriented and in no acute distress     Mild pink scaly plaque on occipital scalp  Thin scaly plaque on bilateral elbows    Eyes: Conjunctivae/lids:Normal     ENT: Lips: normal    MSK:Normal    Pulm: Breathing Normal    Neuro/Psych: Orientation:Normal; Mood/Affect:Normal  A/P:  1. Plaque Psoriasis on scalp and elbows  Discussed pathophysiology, informational handout was given.   Continue clobetasol twice daily for two weeks then 1-2 times weekly as needed.   Consider intralesional steroid injections.   Skin care  regimen reviewed with patient: Eliminate harsh soaps, i.e. Dial, zest, irsih spring; Mild soaps such as Cetaphil or Dove sensitive skin, avoid hot or cold showers, aggressive use of emollients including vanicream, cetaphil or cerave discussed with patient.  Return to clinic as needed.

## 2018-10-26 NOTE — NURSING NOTE
"Initial /82  Pulse 62  SpO2 99% Estimated body mass index is 22.87 kg/(m^2) as calculated from the following:    Height as of 9/5/18: 1.613 m (5' 3.5\").    Weight as of 9/5/18: 59.5 kg (131 lb 3 oz). .    Bhavna Rothman LPN    "

## 2018-12-07 ENCOUNTER — OFFICE VISIT (OUTPATIENT)
Dept: LAB | Facility: SCHOOL | Age: 53
End: 2018-12-07
Payer: COMMERCIAL

## 2018-12-07 VITALS
TEMPERATURE: 98 F | OXYGEN SATURATION: 100 % | SYSTOLIC BLOOD PRESSURE: 154 MMHG | HEIGHT: 64 IN | HEART RATE: 66 BPM | DIASTOLIC BLOOD PRESSURE: 100 MMHG | WEIGHT: 137 LBS | BODY MASS INDEX: 23.39 KG/M2 | RESPIRATION RATE: 16 BRPM

## 2018-12-07 DIAGNOSIS — H66.002 ACUTE SUPPURATIVE OTITIS MEDIA OF LEFT EAR WITHOUT SPONTANEOUS RUPTURE OF TYMPANIC MEMBRANE, RECURRENCE NOT SPECIFIED: ICD-10-CM

## 2018-12-07 DIAGNOSIS — I10 BENIGN ESSENTIAL HYPERTENSION: Primary | ICD-10-CM

## 2018-12-07 PROCEDURE — 99213 OFFICE O/P EST LOW 20 MIN: CPT

## 2018-12-07 RX ORDER — AMLODIPINE BESYLATE 5 MG/1
5 TABLET ORAL DAILY
Qty: 30 TABLET | Refills: 1 | Status: SHIPPED | OUTPATIENT
Start: 2018-12-07 | End: 2019-05-21

## 2018-12-07 RX ORDER — AMOXICILLIN 875 MG
875 TABLET ORAL 2 TIMES DAILY
Qty: 20 TABLET | Refills: 0 | Status: SHIPPED | OUTPATIENT
Start: 2018-12-07 | End: 2019-05-21

## 2018-12-07 NOTE — MR AVS SNAPSHOT
After Visit Summary   12/7/2018    Perri Scherer    MRN: [id number]           Patient Information     Date Of Birth          1965        Visit Information        Provider Department      12/7/2018 8:30 AM Provider, [facility]         Today's Diagnoses     Benign essential hypertension    -  1    Acute suppurative otitis media of left ear without spontaneous rupture of tympanic membrane, recurrence not specified          Care Instructions      Thank you for using the [facility].  If there is no improvement of your condition, please call and schedule an appointment with your primary care provider.    The medication (s), dosing, route and duration was discussed with the patient.  In addition the drug monograph was reviewed and given to the patient for the medication (s).    Middle Ear Infection (Adult)  You have an infection of the middle ear, the space behind the eardrum. This is also called acute otitis media (AOM). Sometimes it is caused by the common cold. This is because congestion can block the internal passage (eustachian tube) that drains fluid from the middle ear. When the middle ear fills with fluid, bacteria can grow there and cause an infection. Oral antibiotics are used to treat this illness, not ear drops. Symptoms usually start to improve within 1 to 2 days of treatment.    Home care  The following are general care guidelines:    Finish all of the antibiotic medicine given, even though you may feel better after the first few days.    You may use over-the-counter medicine, such as acetaminophen or ibuprofen, to control pain and fever, unless something else was prescribed. If you have chronic liver or kidney disease or have ever had a stomach ulcer or gastrointestinal bleeding, talk with your healthcare provider before using these medicines. Do not give aspirin to anyone under 18 years of age who has a fever. It may cause  "severe illness or death.  Follow-up care  Follow up with your healthcare provider, or as advised, in 2 weeks if all symptoms have not gotten better, or if hearing doesn't go back to normal within 1 month.  When to seek medical advice  Call your healthcare provider right away if any of these occur:    Ear pain gets worse or does not improve after 3 days of treatment    Unusual drowsiness or confusion    Neck pain, stiff neck, or headache    Fluid or blood draining from the ear canal    Fever of 100.4 F (38 C) or as advised     Seizure  Date Last Reviewed: 6/1/2016 2000-2018 [employer]. [address] [place], [place] [address]. All rights reserved. This information is not intended as a substitute for professional medical care. Always follow your healthcare professional's instructions.                Follow-ups after your visit        Who to contact     If you have questions or need follow up information about today's clinic visit or your schedule please contact [place] [address] directly at [phone].  Normal or non-critical lab and imaging results will be communicated to you by Workechart, letter or phone within 4 business days after the clinic has received the results. If you do not hear from us within 7 days, please contact the clinic through Workechart or phone. If you have a critical or abnormal lab result, we will notify you by phone as soon as possible.  Submit refill requests through Startpack or call your pharmacy and they will forward the refill request to us. Please allow 3 business days for your refill to be completed.          Additional Information About Your Visit        Startpack Information     Startpack lets you send messages to your doctor, view your test results, renew your prescriptions, schedule appointments and more. To sign up, go to [email or web address] . Click on \"Log in\" on the left side of the screen, which will take you to the Welcome page. Then click on " "\"Sign up Now\" on the right side of the page.     You will be asked to enter the access code listed below, as well as some personal information. Please follow the directions to create your username and password.     Your access code is: [email or web address]  Expires: 2018  9:22 AM     Your access code will  in 90 days. If you need help or a new code, please call your [facility] clinic or [phone].        Care EveryWhere ID     This is your Care EveryWhere ID. This could be used by other organizations to access your [facility] medical records  [id number]        Your Vitals Were     Pulse Temperature Respirations Height Pulse Oximetry BMI (Body Mass Index)    66 98  F (36.7  C) (Tympanic) 16 5' 3.5\" (1.613 m) 100% 23.89 kg/m2       Blood Pressure from Last 3 Encounters:   18 (!) 154/100   10/26/18 135/82   18 (!) 149/94    Weight from Last 3 Encounters:   18 137 lb (62.1 kg)   18 131 lb 3 oz (59.5 kg)   18 137 lb (62.1 kg)              Today, you had the following     No orders found for display         Today's Medication Changes          These changes are accurate as of 18  8:55 AM.  If you have any questions, ask your nurse or doctor.               Start taking these medicines.        Dose/Directions    amLODIPine 5 MG tablet   Commonly known as:  NORVASC   Used for:  Benign essential hypertension   Started by:  [employer]        Dose:  5 mg   Take 1 tablet (5 mg) by mouth daily   Quantity:  30 tablet   Refills:  1       amoxicillin 875 MG tablet   Commonly known as:  AMOXIL   Used for:  Acute suppurative otitis media of left ear without spontaneous rupture of tympanic membrane, recurrence not specified   Started by:  [employer]        Dose:  875 mg   Take 1 tablet (875 mg) by mouth 2 times daily   Quantity:  20 tablet   Refills:  0            Where to get your medicines      These medications were sent to [facility] [address] [place] [address] - [address] AT [facility] [address]  [address], [facility] [address]     Phone:  [phone]     amLODIPine 5 MG tablet         Some of these will need a paper prescription and others can be bought over the counter.  Ask your nurse if you have questions.     Bring a paper prescription for each of these medications     amoxicillin 875 MG tablet                Primary Care Provider Office Phone # Fax #    KATEY Fernandez [facility] [phone] [address] [facility] [address]        Equal Access to Services     JEAN BECKMAN : Hadii aad ku hadasho Soomaali, waaxda luqadaha, qaybta kaalmada adeegyada, cynthia santana . So [facility] [phone].    ATENCIÓN: Si habla español, tiene a david disposición servicios gratuitos de asistencia lingüística. Llame al [phone].    We comply with applicable federal civil rights laws and [place] laws. We do not discriminate on the basis of race, color, national origin, age, disability, sex, sexual orientation, or gender identity.            Thank you!     Thank you for choosing [facility]  for your care. Our goal is always to provide you with excellent care. Hearing back from our patients is one way we can continue to improve our services. Please take a few minutes to complete the written survey that you may receive in the mail after your visit with us. Thank you!             Your Updated Medication List - Protect others around you: Learn how to safely use, store and throw away your medicines at www.disposemymeds.org.          This list is accurate as of 12/7/18  8:55 AM.  Always use your most recent med list.                   Brand Name Dispense Instructions for use Diagnosis    amLODIPine 5 MG tablet    NORVASC    30 tablet    Take 1 tablet (5 mg) by mouth daily    Benign essential hypertension       amoxicillin 875 MG tablet    AMOXIL    20 tablet    Take 1 tablet (875 mg) by mouth 2 times  daily    Acute suppurative otitis media of left ear without spontaneous rupture of tympanic membrane, recurrence not specified       butalbital-acetaminophen-caffeine -40 MG tablet    FIORICET/ESGIC    15 tablet    Take 1 tablet by mouth every 6 hours as needed    Other migraine without status migrainosus, not intractable       clobetasol 0.05 % external cream    TEMOVATE    30 g    Apply sparingly to affected area twice daily for 14 days.  Do not apply to face.    Psoriasis       levothyroxine 100 MCG tablet    SYNTHROID/LEVOTHROID    90 tablet    TAKE 1 TABLET(100 MCG) BY MOUTH DAILY    Hypothyroidism, unspecified type       nystatin-triamcinolone 976954-7.1 UNIT/GM-% external cream    MYCOLOG II    60 g    Apply topically 2 times daily    Vaginal discharge       SUMAtriptan 50 MG tablet    IMITREX    9 tablet    Take 1 tablet (50 mg) by mouth at onset of headache for migraine May repeat in 2 hours. Max 4 tablets/24 hours.        tacrolimus 0.1 % external ointment    PROTOPIC    30 g    Apply twice daily as needed.    Dermatitis, perioral

## 2018-12-07 NOTE — PROGRESS NOTES
SUBJECTIVE:   Perri Scherer is a 53 year old female who presents to clinic today for the following health issues:      ENT Symptoms             Symptoms: cc Present Absent Comment   Fever/Chills   x    Fatigue   x    Muscle Aches   x    Eye Irritation   x    Sneezing  x     Nasal Chandrakant/Drg  x  congestion   Sinus Pressure/Pain   x    Loss of smell   x    Dental pain   x    Sore Throat  x  mild   Swollen Glands   x    Ear Pain/Fullness x x  Bilateral, left is worse, both ears itch and there is flaking in both ears. Hx of scalp psoriasis.   Cough   x    Wheeze   x    Chest Pain   x    Shortness of breath   x    Rash   x    Other         Symptom duration:  3 weeks   Symptom severity:  moderate   Treatments tried:  none   Contacts:  on plane little over a week ago       Problem list and histories reviewed & adjusted, as indicated.  Additional history: as documented    Patient Active Problem List   Diagnosis     Nicotine use disorder     Family history of diabetes mellitus     Hyperlipidemia LDL goal <160     Hypothyroidism     Benign essential hypertension     Hypothyroidism, unspecified type     Other migraine without status migrainosus, not intractable     Past Surgical History:   Procedure Laterality Date     TUBAL LIGATION  1992       Social History   Substance Use Topics     Smoking status: Former Smoker     Types: Cigarettes     Quit date: 11/1/2017     Smokeless tobacco: Never Used      Comment:   otherwise E-cigs only     Alcohol use Yes      Comment: occasional     Family History   Problem Relation Age of Onset     Diabetes Mother      C.A.D. Father      Alzheimer Disease Maternal Grandfather      Melanoma No family hx of            Reviewed and updated as needed this visit by clinical staff       Reviewed and updated as needed this visit by Provider         ROS:  Constitutional, HEENT, cardiovascular, pulmonary, gi and gu systems are negative, except as otherwise noted.    OBJECTIVE:     BP (!) 154/100  Pulse  "66  Temp 98  F (36.7  C) (Tympanic)  Resp 16  Ht 5' 3.5\" (1.613 m)  Wt 137 lb (62.1 kg)  SpO2 100%  BMI 23.89 kg/m2  Body mass index is 23.89 kg/(m^2).  GENERAL: healthy, alert and no distress  EYES: Eyes grossly normal to inspection, PERRL and conjunctivae and sclerae normal  HENT: normal cephalic/atraumatic, right ear: clear effusion and mild erythema in canal, left ear: bulging membrane, mucopurulent effusion and fissure to top of ear canal, nose and mouth without ulcers or lesions, oropharynx clear, oral mucous membranes moist and sinuses: not tender  NECK: no adenopathy, no asymmetry, masses, or scars and thyroid normal to palpation  CV: regular rates and rhythm  NEURO: Normal strength and tone, mentation intact and speech normal    Diagnostic Test Results:  none     ASSESSMENT/PLAN:       ICD-10-CM    1. Benign essential hypertension I10 amLODIPine (NORVASC) 5 MG tablet   2. Acute suppurative otitis media of left ear without spontaneous rupture of tympanic membrane, recurrence not specified H66.002 amoxicillin (AMOXIL) 875 MG tablet       CONSULTATION/REFERRAL to DERM if ear itching and flaking does not improve. Trial small amount of Clobetasole in canals and on fissure.    FUTURE APPOINTMENTS:       - Follow up in 1 week for persistent ear symptoms, sooner for new or worsening symptoms. Repeat BP check in 2 weeks. Discussed limitations of school clinic.       Patient Instructions     Thank you for using the [facility].  If there is no improvement of your condition, please call and schedule an appointment with your primary care provider.    The medication (s), dosing, route and duration was discussed with the patient.  In addition the drug monograph was reviewed and given to the patient for the medication (s).    Middle Ear Infection (Adult)  You have an infection of the middle ear, the space behind the eardrum. This is also called acute otitis media (AOM). Sometimes it is caused " by the common cold. This is because congestion can block the internal passage (eustachian tube) that drains fluid from the middle ear. When the middle ear fills with fluid, bacteria can grow there and cause an infection. Oral antibiotics are used to treat this illness, not ear drops. Symptoms usually start to improve within 1 to 2 days of treatment.    Home care  The following are general care guidelines:    Finish all of the antibiotic medicine given, even though you may feel better after the first few days.    You may use over-the-counter medicine, such as acetaminophen or ibuprofen, to control pain and fever, unless something else was prescribed. If you have chronic liver or kidney disease or have ever had a stomach ulcer or gastrointestinal bleeding, talk with your healthcare provider before using these medicines. Do not give aspirin to anyone under 18 years of age who has a fever. It may cause severe illness or death.  Follow-up care  Follow up with your healthcare provider, or as advised, in 2 weeks if all symptoms have not gotten better, or if hearing doesn't go back to normal within 1 month.  When to seek medical advice  Call your healthcare provider right away if any of these occur:    Ear pain gets worse or does not improve after 3 days of treatment    Unusual drowsiness or confusion    Neck pain, stiff neck, or headache    Fluid or blood draining from the ear canal    Fever of 100.4 F (38 C) or as advised     Seizure  Date Last Reviewed: 6/1/2016 2000-2018 [employer]. [address] [place] [address]. All rights reserved. This information is not intended as a substitute for professional medical care. Always follow your healthcare professional's instructions.          KATEY Feng [facility] PROVIDER  [facility]

## 2018-12-07 NOTE — PATIENT INSTRUCTIONS
Thank you for using the [facility].  If there is no improvement of your condition, please call and schedule an appointment with your primary care provider.    The medication (s), dosing, route and duration was discussed with the patient.  In addition the drug monograph was reviewed and given to the patient for the medication (s).    Middle Ear Infection (Adult)  You have an infection of the middle ear, the space behind the eardrum. This is also called acute otitis media (AOM). Sometimes it is caused by the common cold. This is because congestion can block the internal passage (eustachian tube) that drains fluid from the middle ear. When the middle ear fills with fluid, bacteria can grow there and cause an infection. Oral antibiotics are used to treat this illness, not ear drops. Symptoms usually start to improve within 1 to 2 days of treatment.    Home care  The following are general care guidelines:    Finish all of the antibiotic medicine given, even though you may feel better after the first few days.    You may use over-the-counter medicine, such as acetaminophen or ibuprofen, to control pain and fever, unless something else was prescribed. If you have chronic liver or kidney disease or have ever had a stomach ulcer or gastrointestinal bleeding, talk with your healthcare provider before using these medicines. Do not give aspirin to anyone under 18 years of age who has a fever. It may cause severe illness or death.  Follow-up care  Follow up with your healthcare provider, or as advised, in 2 weeks if all symptoms have not gotten better, or if hearing doesn't go back to normal within 1 month.  When to seek medical advice  Call your healthcare provider right away if any of these occur:    Ear pain gets worse or does not improve after 3 days of treatment    Unusual drowsiness or confusion    Neck pain, stiff neck, or headache    Fluid or blood draining from the ear canal    Fever of  100.4 F (38 C) or as advised     Seizure  Date Last Reviewed: 6/1/2016 2000-2018 [employer]. [address] [place] [address], [place] [address]. All rights reserved. This information is not intended as a substitute for professional medical care. Always follow your healthcare professional's instructions.

## 2019-02-07 ENCOUNTER — TELEPHONE (OUTPATIENT)
Dept: FAMILY MEDICINE | Facility: CLINIC | Age: 54
End: 2019-02-07

## 2019-02-07 NOTE — TELEPHONE ENCOUNTER
Panel Management Review      Patient has the following on her problem list: None      Composite cancer screening  Chart review shows that this patient is due/due soon for the following Pap Smear, Mammogram and Fecal Colorectal (FIT)  Summary:    Patient is due/failing the following:   FIT, MAMMOGRAM and PAP    Action needed:   Patient needs office visit for physical with pap.    Type of outreach:    Sent letter.    Questions for provider review:    None                                                                                                                                    Dominique Bourgeois

## 2019-02-07 NOTE — LETTER
February 7, 2019      Perri Scherer  [address] [facility] [address]          Dear Perri Scherer, Please complete your fit test     At [facility] we care about your health and are committed to providing quality patient care, which includes staying current on preventative cancer screenings.  You can increase your chances of finding and treating cancers through regular screenings.      Our records show that you are due for the following screening(s):      Colonoscopy for colon cancer - Call [phone] to schedule   Recommended every ten years for everyone age 50 and older  Please take a moment to read over the enclosed information packet about colon cancer screening.   We strongly urge our patient's to consider having a colonoscopy done, which is the best screening test available and only needs to be done every 10 years if normal.      Mammogram for breast cancer - [phone] to schedule  Recommended every 1-2 years for women age 50 and older  Mammograms help detect breast cancer, which is the most common cancer among women in the [place].  You may need to start having mammograms earlier and more often if you have had breast cancer, breast problems, or a family history of breast cancer.     Pap Smear for cervical cancer - [phone] to schedule  Recommended every three years for women 21 and older  A Pap test is used to detect cervical cancer.  The test should be taken at least once every three years but women who are at a greater risk for cervical cancer may need to have the test more often.      You are at a greater risk for cervical cancer if:   - You have had a sexually transmitted disease   - You have had more than one sex partner   - You have had an abnormal pap test in the past    If you have a My-Chart Account, you also can schedule this appointment through there.    If you have already had one or all of the above screening tests at another facility, please call us so that we  may update your chart.      Your partners in health,      Quality Committee   [facility]

## 2019-03-14 ENCOUNTER — TELEPHONE (OUTPATIENT)
Dept: FAMILY MEDICINE | Facility: CLINIC | Age: 54
End: 2019-03-14

## 2019-03-14 DIAGNOSIS — Z12.31 ENCOUNTER FOR SCREENING MAMMOGRAM FOR BREAST CANCER: Primary | ICD-10-CM

## 2019-03-14 NOTE — TELEPHONE ENCOUNTER
Panel Management Review      Patient has the following on her problem list:   Patient Active Problem List   Diagnosis     Nicotine use disorder     Family history of diabetes mellitus     Hyperlipidemia LDL goal <160     Hypothyroidism     Benign essential hypertension     Hypothyroidism, unspecified type     Other migraine without status migrainosus, not intractable       Composite cancer screening  Chart review shows that this patient is due/due soon for the following   Health Maintenance   Topic Date Due     HIV SCREEN (SYSTEM ASSIGNED)  10/22/1983     HEPATITIS C SCREENING  10/22/1983     MAMMO SCREEN Q2 YR (SYSTEM ASSIGNED)  12/27/2013     COLON CANCER SCREEN (SYSTEM ASSIGNED)  10/22/2015     ZOSTER IMMUNIZATION (1 of 2) 10/22/2015     PREVENTIVE CARE VISIT  01/19/2016     LIPID MONITORING Q1 YEAR  04/26/2017     PAP SCREENING Q3 YR (SYSTEM ASSIGNED)  01/19/2018     INFLUENZA VACCINE (1) 09/01/2018     PHQ-2 Q1 YR  04/26/2019     DTAP/TDAP/TD IMMUNIZATION (3 - Td) 03/23/2020     IPV IMMUNIZATION  Aged Out     MENINGITIS IMMUNIZATION  Aged Out     Summary:    Patient is due/failing the following:   See list above -mammo and b/p check     Action needed:   Patient needs nurse only appointment.    Type of outreach:    Sent letter.    Questions for provider review:    None                                                                                                                                    Catherine Olivia MA     Chart routed to none .

## 2019-03-14 NOTE — LETTER
[facility]  [address]  [place] [address]  Phone: [phone]      3/14/2019     Perri Scherer  [address] [facility] [address]      Dear Perri:    .I noticed that your blood pressure was elevated at a recent visit. It is important to get this under control due to the long-term effects of hypertension on the heart. To have this rechecked, call the clinic scheduling desk at [phone] to schedule a nurse only appointment free of charge at the [facility] of your choice.     BP Readings from Last 6 Encounters:   12/07/18 (!) 154/100   10/26/18 135/82   09/27/18 (!) 149/94   09/05/18 100/70   08/22/18 138/86   06/22/18 118/81     You are also due for a screening Mammogram. Please contact the Diagnostics Registration Department at: [phone] to schedule this appointment, updated orders have been placed.    Sincerely,      Gabby DEL TORO CNP/Catherine Olivia MA

## 2019-04-29 ENCOUNTER — TELEPHONE (OUTPATIENT)
Dept: FAMILY MEDICINE | Facility: CLINIC | Age: 54
End: 2019-04-29

## 2019-04-29 DIAGNOSIS — E03.9 HYPOTHYROIDISM, UNSPECIFIED TYPE: Primary | ICD-10-CM

## 2019-04-29 NOTE — TELEPHONE ENCOUNTER
"TSH ordered. Left message that \"lab was placed as requested, call back if further questions\".      GUERITA AlvesN, RN    "

## 2019-04-29 NOTE — TELEPHONE ENCOUNTER
Reason for Call: Request for an order or referral:    Order or referral being requested: lab orders to renew thyroid medication    Date needed: as soon as possible    Has the patient been seen by the PCP for this problem? NO    Additional comments: pt last had this checked 4/2018    Phone number Patient can be reached at:  Home number on file [phone] (home) [phone]    Best Time:  any    Can we leave a detailed message on this number?  YES    Call taken on 4/29/2019 at 10:54 AM by Alycia Myers

## 2019-05-01 DIAGNOSIS — E03.9 HYPOTHYROIDISM, UNSPECIFIED TYPE: ICD-10-CM

## 2019-05-01 LAB — TSH SERPL DL<=0.005 MIU/L-ACNC: 1.31 MU/L (ref 0.4–4)

## 2019-05-01 PROCEDURE — 84443 ASSAY THYROID STIM HORMONE: CPT | Performed by: NURSE PRACTITIONER

## 2019-05-01 PROCEDURE — 36415 COLL VENOUS BLD VENIPUNCTURE: CPT | Performed by: NURSE PRACTITIONER

## 2019-05-02 ENCOUNTER — TELEPHONE (OUTPATIENT)
Dept: FAMILY MEDICINE | Facility: CLINIC | Age: 54
End: 2019-05-02

## 2019-05-02 NOTE — TELEPHONE ENCOUNTER
Panel Management Review      Patient has the following on her problem list:   Patient Active Problem List   Diagnosis     Nicotine use disorder     Family history of diabetes mellitus     Hyperlipidemia LDL goal <160     Hypothyroidism     Benign essential hypertension     Hypothyroidism, unspecified type     Other migraine without status migrainosus, not intractable       Composite cancer screening  Chart review shows that this patient is due/due soon for the following   Health Maintenance   Topic Date Due     HIV SCREEN (SYSTEM ASSIGNED)  10/22/1983     HEPATITIS C SCREENING  10/22/1983     MAMMO SCREEN Q2 YR (SYSTEM ASSIGNED)  12/27/2013     COLON CANCER SCREEN (SYSTEM ASSIGNED)  10/22/2015     ZOSTER IMMUNIZATION (1 of 2) 10/22/2015     PREVENTIVE CARE VISIT  01/19/2016     LIPID MONITORING Q1 YEAR  04/26/2017     PAP SCREENING Q3 YR (SYSTEM ASSIGNED)  01/19/2018     PHQ-2  01/01/2019     INFLUENZA VACCINE (Season Ended) 09/01/2019     DTAP/TDAP/TD IMMUNIZATION (3 - Td) 03/23/2020     IPV IMMUNIZATION  Aged Out     MENINGITIS IMMUNIZATION  Aged Out     Summary:    Patient is due/failing the following:   PE with pap, labs, mammoram, colon screen.     Action needed:   Patient needs office visit.    Type of outreach:    Sent letter.    Questions for provider review:    None                                                                                                                                    Catherine Olivia MA     Chart routed to none .

## 2019-05-02 NOTE — LETTER
[facility]  [address]  [facility] [address]  Phone: [phone]      5/2/2019     Perri Scherer  [address] [facility] [address]      Dear Perri:    I have reviewed your chart and below is a copy of your health maintenance list:  Health Maintenance   Topic Date Due     HIV SCREEN (SYSTEM ASSIGNED)  10/22/1983     HEPATITIS C SCREENING  10/22/1983     MAMMO SCREEN Q2 YR (SYSTEM ASSIGNED)  12/27/2013     COLON CANCER SCREEN (SYSTEM ASSIGNED)  10/22/2015     ZOSTER IMMUNIZATION (1 of 2) 10/22/2015     PREVENTIVE CARE VISIT  01/19/2016     LIPID MONITORING Q1 YEAR  04/26/2017     PAP SCREENING Q3 YR (SYSTEM ASSIGNED)  01/19/2018     PHQ-2  01/01/2019     INFLUENZA VACCINE (Season Ended) 09/01/2019     DTAP/TDAP/TD IMMUNIZATION (3 - Td) 03/23/2020     IPV IMMUNIZATION  Aged Out     MENINGITIS IMMUNIZATION  Aged Out     You are due for a physical with pap screening and possible lab work. Please call the clinic scheduling desk at [phone].    You are also due for a screening Mammogram. I have placed updated orders, please contact the Diagnostics Registration Department at: [phone] to schedule this appointment.    If you have any additional questions or concerns, if you have had testing done elsewhere, or if you wish to decline, please notify your clinic, take care!    Sincerely,      Tiffanie DEL TORO CNP/Gabby DEL TORO CNP/Catherine Olivia MA

## 2019-05-06 DIAGNOSIS — E03.9 HYPOTHYROIDISM, UNSPECIFIED TYPE: ICD-10-CM

## 2019-05-06 RX ORDER — LEVOTHYROXINE SODIUM 100 UG/1
100 TABLET ORAL DAILY
Qty: 90 TABLET | Refills: 1 | Status: SHIPPED | OUTPATIENT
Start: 2019-05-06 | End: 2019-10-22

## 2019-05-06 NOTE — TELEPHONE ENCOUNTER
Reason for Call:  Medication or medication refill:    Do you use a [facility]?  Name of the pharmacy and phone number for the current request:  [facility] [place] [phone]    Name of the medication requested: Levothyroxine - Pt called for refill.  Had lab work done last week and TSH was normal.  No need to call patient back, unless there are questions or problems.      Levothyroxine  Last Written Prescription Date:  5/8/18  Last Fill Quantity: 90,  # refills: 3   Last office visit: 12/7/2018 with prescribing provider:     Future Office Visit:      Other request:     Can we leave a detailed message on this number? YES    Phone number patient can be reached at: Work number on file:  [phone] (work)    Best Time: any    Call taken on 5/6/2019 at 7:36 AM by Bettie Christian

## 2019-05-06 NOTE — TELEPHONE ENCOUNTER
"Requested Prescriptions   Pending Prescriptions Disp Refills     levothyroxine (SYNTHROID/LEVOTHROID) 100 MCG tablet 90 tablet 3       Thyroid Protocol Passed - 5/6/2019  8:44 AM        Passed - Patient is 12 years or older        Passed - Recent (12 mo) or future (30 days) visit within the authorizing provider's specialty     Patient had office visit in the last 12 months or has a visit in the next 30 days with authorizing provider or within the authorizing provider's specialty.  See \"Patient Info\" tab in inbasket, or \"Choose Columns\" in Meds & Orders section of the refill encounter.              Passed - Medication is active on med list        Passed - Normal TSH on file in past 12 months     Recent Labs   Lab Test 05/01/19  0724   TSH 1.31              Passed - No active pregnancy on record     If patient is pregnant or has had a positive pregnancy test, please check TSH.          Passed - No positive pregnancy test in past 12 months     If patient is pregnant or has had a positive pregnancy test, please check TSH.          Notes recorded by Kimmy Peter APRN CNP on 5/2/2019 at 11:27 AM CDT  TSH is normal  "

## 2019-05-21 ENCOUNTER — OFFICE VISIT (OUTPATIENT)
Dept: LAB | Facility: SCHOOL | Age: 54
End: 2019-05-21
Payer: COMMERCIAL

## 2019-05-21 VITALS
OXYGEN SATURATION: 97 % | WEIGHT: 137 LBS | SYSTOLIC BLOOD PRESSURE: 120 MMHG | DIASTOLIC BLOOD PRESSURE: 68 MMHG | TEMPERATURE: 98.7 F | HEIGHT: 64 IN | HEART RATE: 68 BPM | BODY MASS INDEX: 23.39 KG/M2

## 2019-05-21 DIAGNOSIS — Z00.00 WELLNESS EXAMINATION: Primary | ICD-10-CM

## 2019-05-21 PROCEDURE — 90471 IMMUNIZATION ADMIN: CPT

## 2019-05-21 PROCEDURE — 90715 TDAP VACCINE 7 YRS/> IM: CPT

## 2019-05-21 PROCEDURE — 99213 OFFICE O/P EST LOW 20 MIN: CPT | Mod: 25

## 2019-05-21 RX ORDER — LISINOPRIL 20 MG/1
20 TABLET ORAL DAILY
COMMUNITY
End: 2019-10-22

## 2019-05-21 ASSESSMENT — MIFFLIN-ST. JEOR: SCORE: 1203.49

## 2019-05-21 NOTE — PATIENT INSTRUCTIONS
"Perri Scherer has completed a Wellness Check at the [facility] on 5/21/2019.      ____________________________________________  [place] [employer]CHO[employer] PROVIDER                                                                               Wellness Visit Recommendations:      See your regular primary care health provider every year in order to help stay healthy.    Review health changes.     Review your medicines if your doctor has prescribed any.    Talk to your provider about how often to have your cholesterol checked.    If you are at risk for diabetes, you should have a diabetes test (fasting glucose).    Shots: Get a flu shot each year. Get a tetanus shot every 10 years.     Review with your primary care provider other immunizations that you may need based on your age and/or medical/surgical history    Nutrition:     Eat at least 5 servings of fruits and vegetables each day.    Eat whole-grain bread, whole-wheat pasta and brown rice instead of white grains and rice.    Preventive Care to be reviewed by your primary care provider:    Females:        Cervical Cancer Screening                          Breast Cancer Screening                          Colon Cancer Screening  Males:             Prostrate Cancer Screening                          Colon Cancer Screening      Lifestyle:    Exercise at least 150 minutes a week (30 minutes a day, 5 days of the week). This will help you control your weight and help prevent disease or manage disease.    Limit alcohol to one drink per day or less depending on your past medical history.    No smoking.     Wear sunscreen to prevent skin cancer.    See your dentist every six months for an exam and cleaning.    Today's Vital Signs:  /68 (Cuff Size: Adult Large)   Pulse 68   Temp 98.7  F (37.1  C) (Tympanic)   Ht 1.613 m (5' 3.5\")   Wt 62.1 kg (137 lb)   SpO2 97%   BMI 23.89 kg/m    "

## 2019-05-21 NOTE — NURSING NOTE
Screening Questionnaire for Adult Immunization    Are you sick today?   No   Do you have allergies to medications, food, a vaccine component or latex?   No   Have you ever had a serious reaction after receiving a vaccination?   No   Do you have a long-term health problem with heart disease, lung disease, asthma, kidney disease, metabolic disease (e.g. diabetes), anemia, or other blood disorder?   No   Do you have cancer, leukemia, HIV/AIDS, or any other immune system problem?   No   In the past 3 months, have you taken medications that affect  your immune system, such as prednisone, other steroids, or anticancer drugs; drugs for the treatment of rheumatoid arthritis, Crohn s disease, or psoriasis; or have you had radiation treatments?   No   Have you had a seizure, or a brain or other nervous system problem?   No   During the past year, have you received a transfusion of blood or blood     products, or been given immune (gamma) globulin or antiviral drug?   No   For women: Are you pregnant or is there a chance you could become        pregnant during the next month?   No   Have you received any vaccinations in the past 4 weeks?   No     Immunization questionnaire answers were all negative.    Per orders of Ramila Juares, MOSES injection of Tdap given by Julita Nicole. Patient instructed to remain in clinic for 15 minutes afterwards, and to report any adverse reaction to me immediately.       Screening performed by Julita Nicole on 5/21/2019 at 3:19 PM.

## 2019-05-21 NOTE — PROGRESS NOTES
"SUBJECTIVE:  CC: Perri Scherer is an 53 year old woman who presents for Wellness Check at [id number].   Chief Complaint   Patient presents with     Wellness Visit     Medication Reconciliation     she decided to change back to her lisinopril because the amlodipine was making her dizzy.     Review of Healthy Lifestyle:    Do you get at least three servings of calcium containing foods daily (dairy, green leafy vegetables, etc.)? yes     Do you have a high-fiber diet? yes     Amount of exercise or daily activities, outside of work: 4 day(s) per week    Do you wear sunscreen on a regular basis? Yes    Are you taking your medications regularly Yes     Have you had an eye exam in the past two years? Yes-last May 3rd this year    Do you see a dentist twice per year? yes    Do you have sleep apnea, excessive snoring or excessive daytime drowsiness? no    Do you use tobacco in any form? E-cig     OBJECTIVE:    Vitals: /68 (Cuff Size: Adult Large)   Pulse 68   Temp 98.7  F (37.1  C) (Tympanic)   Ht 1.613 m (5' 3.5\")   Wt 62.1 kg (137 lb)   SpO2 97%   BMI 23.89 kg/m    BMI= Body mass index is 23.89 kg/m .    HEARING: Right Ear:        500Hz:  RESPONSE- on Level:   20 db    1000 Hz: RESPONSE- on Level:   20 db    2000 Hz: RESPONSE- on Level:   20 db    4000 Hz: RESPONSE- on Level: 25 db    Left Ear:       500Hz:  RESPONSE- on Level: 25 db   1000 Hz: RESPONSE- on Level: 40 db   2000 Hz: RESPONSE- on Level: 40 db   4000 Hz: RESPONSE- on Level: tone not heard    VISION:  New glasses this month    Medication Reconciliation: complete    ASSESSMENT/PLAN:  Patient is here today for wellness examination.  Patient was given information on recommendations for health, preventative care, diet and lifestyle changes for her health.  Recommend that patient establishes care with a primary care provider and gets yearly check ups.    COUNSELING:      reports that she quit smoking about 18 months ago. " "Her smoking use included cigarettes. She has never used smokeless tobacco.  Tobacco Cessation Action Plan: Information offered: Patient not interested at this time, recommended follow-up with PCP for smoking cessation plan  Estimated body mass index is 23.89 kg/m  as calculated from the following:    Height as of this encounter: 1.613 m (5' 3.5\").    Weight as of this encounter: 62.1 kg (137 lb).     Karol Mcarthur NP on 5/21/2019 at 3:22 PM  [facility]     "

## 2019-07-19 ENCOUNTER — TELEPHONE (OUTPATIENT)
Dept: FAMILY MEDICINE | Facility: CLINIC | Age: 54
End: 2019-07-19

## 2019-07-19 NOTE — TELEPHONE ENCOUNTER
Reason for Call:  Other call back    Detailed comments: Pt picked up Lisinopril and is wondering why she needs an appt and if this is just for BP check, labs, or OV, please advise.    Phone Number Patient can be reached at: Other phone number: Before 1pm [phone]*    Best Time: before 1, after 1 try cell    Can we leave a detailed message on this number? YES    Call taken on 7/19/2019 at 8:39 AM by Loreto Simpson

## 2019-07-19 NOTE — TELEPHONE ENCOUNTER
Patient is due for a BMP lab. Orders are in Epic, patient can schedule a LAB ONLY appointment for blood draw.   [facility] fatoumata to deliver message and assist in scheduling a LAB ONLY appointment. .

## 2019-07-22 ENCOUNTER — DOCUMENTATION ONLY (OUTPATIENT)
Dept: FAMILY MEDICINE | Facility: CLINIC | Age: 54
End: 2019-07-22

## 2019-07-22 DIAGNOSIS — I10 BENIGN ESSENTIAL HYPERTENSION: Primary | ICD-10-CM

## 2019-07-22 DIAGNOSIS — E78.5 HYPERLIPIDEMIA LDL GOAL <160: ICD-10-CM

## 2019-07-22 NOTE — PROGRESS NOTES
Called to the ED to start patient on high flow. Patient on 6 lpm SpO2 88%. Patient on 15 lpm via HFNC SALTER. Will continue to monitor patient closely. Patient has lab appointment.  Need orders ASAP.  Thank you!

## 2019-07-24 DIAGNOSIS — E78.5 HYPERLIPIDEMIA LDL GOAL <160: ICD-10-CM

## 2019-07-24 DIAGNOSIS — I10 BENIGN ESSENTIAL HYPERTENSION: ICD-10-CM

## 2019-07-24 LAB
ANION GAP SERPL CALCULATED.3IONS-SCNC: 4 MMOL/L (ref 3–14)
BUN SERPL-MCNC: 11 MG/DL (ref 7–30)
CALCIUM SERPL-MCNC: 9 MG/DL (ref 8.5–10.1)
CHLORIDE SERPL-SCNC: 106 MMOL/L (ref 94–109)
CHOLEST SERPL-MCNC: 225 MG/DL
CO2 SERPL-SCNC: 28 MMOL/L (ref 20–32)
CREAT SERPL-MCNC: 0.64 MG/DL (ref 0.52–1.04)
GFR SERPL CREATININE-BSD FRML MDRD: >90 ML/MIN/{1.73_M2}
GLUCOSE SERPL-MCNC: 87 MG/DL (ref 70–99)
HDLC SERPL-MCNC: 86 MG/DL
LDLC SERPL CALC-MCNC: 116 MG/DL
NONHDLC SERPL-MCNC: 139 MG/DL
POTASSIUM SERPL-SCNC: 4 MMOL/L (ref 3.4–5.3)
SODIUM SERPL-SCNC: 138 MMOL/L (ref 133–144)
TRIGL SERPL-MCNC: 116 MG/DL

## 2019-07-24 PROCEDURE — 80048 BASIC METABOLIC PNL TOTAL CA: CPT | Performed by: NURSE PRACTITIONER

## 2019-07-24 PROCEDURE — 36415 COLL VENOUS BLD VENIPUNCTURE: CPT | Performed by: NURSE PRACTITIONER

## 2019-07-24 PROCEDURE — 80061 LIPID PANEL: CPT | Performed by: NURSE PRACTITIONER

## 2019-07-29 DIAGNOSIS — I10 BENIGN ESSENTIAL HYPERTENSION: ICD-10-CM

## 2019-07-30 ENCOUNTER — TELEPHONE (OUTPATIENT)
Dept: FAMILY MEDICINE | Facility: CLINIC | Age: 54
End: 2019-07-30

## 2019-07-30 RX ORDER — LISINOPRIL 20 MG/1
TABLET ORAL
Qty: 90 TABLET | Refills: 2 | Status: SHIPPED | OUTPATIENT
Start: 2019-07-30 | End: 2020-08-03

## 2019-07-30 NOTE — TELEPHONE ENCOUNTER
Panel Management Review      Patient has the following on her problem list:   Patient Active Problem List   Diagnosis     Nicotine use disorder     Family history of diabetes mellitus     Hyperlipidemia LDL goal <160     Hypothyroidism     Benign essential hypertension     Hypothyroidism, unspecified type     Other migraine without status migrainosus, not intractable       Composite cancer screening  Chart review shows that this patient is due/due soon for the following   Health Maintenance   Topic Date Due     HEPATITIS C SCREENING  1965     COLONOSCOPY  10/22/1975     HIV SCREENING  10/22/1980     MAMMO SCREENING  12/27/2013     ZOSTER IMMUNIZATION (1 of 2) 10/22/2015     PREVENTIVE CARE VISIT  01/19/2016     PAP  01/19/2018     INFLUENZA VACCINE (1) 09/01/2019     LIPID  07/24/2020     DTAP/TDAP/TD IMMUNIZATION (4 - Td) 05/21/2029     PHQ-2  Completed     IPV IMMUNIZATION  Aged Out     MENINGITIS IMMUNIZATION  Aged Out     Summary:    Patient is due/failing the following:   COLONOSCOPY, MAMMOGRAM and PAP    Action needed:   Patient needs office visit for Physical with pap screening. Needs mammogram and colon cancer screening.    Type of outreach:    Phone, left message for patient to call back.     Questions for provider review:    None                                                                                                                                    Catherine Olivia MA     Chart routed to none .

## 2019-07-30 NOTE — TELEPHONE ENCOUNTER
"Requested Prescriptions   Pending Prescriptions Disp Refills     lisinopril (PRINIVIL/ZESTRIL) 20 MG tablet [Pharmacy Med Name: LISINOPRIL 20MG TABLETS] 15 tablet 0     Sig: TAKE 1 TABLET BY MOUTH DAILY  Last Written Prescription Date:  7/17/2019  Last Fill Quantity: 15,  # refills: 0   Last office visit: 5/21/2019 with provider:  Lyubov   Future Office Visit:           ACE Inhibitors (Including Combos) Protocol Passed - 7/29/2019  8:01 PM        Passed - Blood pressure under 140/90 in past 12 months     BP Readings from Last 3 Encounters:   05/21/19 120/68   12/07/18 (!) 154/100   10/26/18 135/82                 Passed - Recent (12 mo) or future (30 days) visit within the authorizing provider's specialty     Patient had office visit in the last 12 months or has a visit in the next 30 days with authorizing provider or within the authorizing provider's specialty.  See \"Patient Info\" tab in inbasket, or \"Choose Columns\" in Meds & Orders section of the refill encounter.              Passed - Medication is active on med list        Passed - Patient is age 18 or older        Passed - No active pregnancy on record        Passed - Normal serum creatinine on file in past 12 months     Recent Labs   Lab Test 07/24/19 0719   CR 0.64             Passed - Normal serum potassium on file in past 12 months     Recent Labs   Lab Test 07/24/19 0719   POTASSIUM 4.0             Passed - No positive pregnancy test within past 12 months          "

## 2019-10-22 ENCOUNTER — OFFICE VISIT (OUTPATIENT)
Dept: FAMILY MEDICINE | Facility: CLINIC | Age: 54
End: 2019-10-22
Payer: COMMERCIAL

## 2019-10-22 VITALS
OXYGEN SATURATION: 98 % | HEIGHT: 64 IN | BODY MASS INDEX: 24.24 KG/M2 | DIASTOLIC BLOOD PRESSURE: 66 MMHG | RESPIRATION RATE: 12 BRPM | TEMPERATURE: 98.3 F | HEART RATE: 81 BPM | WEIGHT: 142 LBS | SYSTOLIC BLOOD PRESSURE: 128 MMHG

## 2019-10-22 DIAGNOSIS — B37.9 YEAST INFECTION: Primary | ICD-10-CM

## 2019-10-22 DIAGNOSIS — B96.89 BACTERIAL VAGINOSIS: ICD-10-CM

## 2019-10-22 DIAGNOSIS — N94.89 VAGINAL BURNING: Primary | ICD-10-CM

## 2019-10-22 DIAGNOSIS — N76.0 BACTERIAL VAGINOSIS: ICD-10-CM

## 2019-10-22 DIAGNOSIS — E03.9 HYPOTHYROIDISM, UNSPECIFIED TYPE: ICD-10-CM

## 2019-10-22 LAB
SPECIMEN SOURCE: ABNORMAL
WET PREP SPEC: ABNORMAL

## 2019-10-22 PROCEDURE — 87210 SMEAR WET MOUNT SALINE/INK: CPT | Performed by: NURSE PRACTITIONER

## 2019-10-22 PROCEDURE — 99214 OFFICE O/P EST MOD 30 MIN: CPT | Performed by: NURSE PRACTITIONER

## 2019-10-22 RX ORDER — LEVOTHYROXINE SODIUM 100 UG/1
100 TABLET ORAL DAILY
Qty: 90 TABLET | Refills: 3 | Status: CANCELLED | OUTPATIENT
Start: 2019-10-22

## 2019-10-22 RX ORDER — METRONIDAZOLE 500 MG/1
500 TABLET ORAL 2 TIMES DAILY
Qty: 14 TABLET | Refills: 0 | Status: SHIPPED | OUTPATIENT
Start: 2019-10-22 | End: 2019-11-07

## 2019-10-22 RX ORDER — LEVOTHYROXINE SODIUM 100 UG/1
100 TABLET ORAL DAILY
Qty: 90 TABLET | Refills: 2 | Status: SHIPPED | OUTPATIENT
Start: 2019-10-22 | End: 2020-08-03

## 2019-10-22 ASSESSMENT — MIFFLIN-ST. JEOR: SCORE: 1221.17

## 2019-10-22 NOTE — PROGRESS NOTES
"Subjective     Perri Scherer is a 54 year old female who presents to clinic today for the following health issues:    HPI   Hypothyroidism Follow-up      Since last visit, patient describes the following symptoms: Weight stable, no hair loss, no skin changes, no constipation, no loose stools        How many servings of fruits and vegetables do you eat daily?  2-3    On average, how many sweetened beverages do you drink each day (soda, juice, sweet tea, etc)?   0    How many days per week do you miss taking your medication? 0        Vaginal Symptoms- due for a pap, wants referral to OB/GYN      Duration: 1 1/2 months, states she has had yeast infections since she was 12 years old. Patient \"self treats \" on a monthly basis. Last wet prep here in clinic was 1 year ago was positive for both yeast and bacteria.     Description  itching and burning    Intensity:  severe    Accompanying signs and symptoms (fever/dysuria/abdominal or back pain): None    History  Sexually active: yes, single partner, contraception not required  Possibility of pregnancy: No  Recent antibiotic use: no     Precipitating or alleviating factors: Hx of chronic yeast infections    Therapies tried and outcome: none, yogurt and probiotics, monistatat   Outcome: none      Patient Active Problem List   Diagnosis     Nicotine use disorder     Family history of diabetes mellitus     Hyperlipidemia LDL goal <160     Hypothyroidism     Benign essential hypertension     Hypothyroidism, unspecified type     Other migraine without status migrainosus, not intractable     Past Surgical History:   Procedure Laterality Date     TUBAL LIGATION         Social History     Tobacco Use     Smoking status: Current Every Day Smoker     Types: Other     Last attempt to quit: 2017     Years since quittin.9     Smokeless tobacco: Never Used     Tobacco comment:   otherwise E-cigs only   Substance Use Topics     Alcohol use: Yes     Comment: weekends mostly.  " 10 beers per week     Family History   Problem Relation Age of Onset     Diabetes Mother      C.A.D. Father      Alzheimer Disease Maternal Grandfather      Substance Abuse Brother      Other - See Comments Sister         plane crash     Melanoma No family hx of            -------------------------------------  Reviewed and updated as needed this visit by Provider         Review of Systems   ROS COMP: Constitutional, HEENT, cardiovascular, pulmonary, GI, , musculoskeletal, neuro, skin, endocrine and psych systems are negative, except as otherwise noted.      Objective    There were no vitals taken for this visit.  There is no height or weight on file to calculate BMI.  Physical Exam   GENERAL: healthy, alert and no distress  RESP: lungs clear to auscultation - no rales, rhonchi or wheezes  CV: regular rate and rhythm, normal S1 S2, no S3 or S4, no murmur, click or rub, no peripheral edema and peripheral pulses strong  MS: no gross musculoskeletal defects noted, no edema    Diagnostic Test Results:  Labs reviewed in Epic        Assessment & Plan     1. Yeast infection  - patient reports frequent vaginal infections although last lab reported for vaginal yeast was 1 year ago. Patient requesting to see GYN  - OB/GYN REFERRAL    2. Hypothyroidism, unspecified type  stable  - levothyroxine (SYNTHROID/LEVOTHROID) 100 MCG tablet; Take 1 tablet (100 mcg) by mouth daily  Dispense: 90 tablet; Refill: 2    3. Bacterial vaginosis    - metroNIDAZOLE (FLAGYL) 500 MG tablet; Take 1 tablet (500 mg) by mouth 2 times daily for 7 days  Dispense: 14 tablet; Refill: 0     Tobacco Cessation:   reports that she has been smoking other. She has never used smokeless tobacco.  Tobacco Cessation Action Plan: pt using E Cig to cut down        No follow-ups on file.    KATEY Fernandez [facility]

## 2019-11-07 ENCOUNTER — OFFICE VISIT (OUTPATIENT)
Dept: OBGYN | Facility: CLINIC | Age: 54
End: 2019-11-07
Payer: COMMERCIAL

## 2019-11-07 VITALS
SYSTOLIC BLOOD PRESSURE: 125 MMHG | TEMPERATURE: 96.8 F | RESPIRATION RATE: 16 BRPM | WEIGHT: 141.6 LBS | DIASTOLIC BLOOD PRESSURE: 75 MMHG | HEIGHT: 64 IN | HEART RATE: 69 BPM | BODY MASS INDEX: 24.17 KG/M2

## 2019-11-07 DIAGNOSIS — N76.0 VAGINAL INFECTION: ICD-10-CM

## 2019-11-07 DIAGNOSIS — N90.89 IRRITATION OF VULVA: Primary | ICD-10-CM

## 2019-11-07 DIAGNOSIS — Z12.4 CERVICAL CANCER SCREENING: ICD-10-CM

## 2019-11-07 LAB
SPECIMEN SOURCE: ABNORMAL
WET PREP SPEC: ABNORMAL

## 2019-11-07 PROCEDURE — 99204 OFFICE O/P NEW MOD 45 MIN: CPT | Mod: 25 | Performed by: OBSTETRICS & GYNECOLOGY

## 2019-11-07 PROCEDURE — 87109 MYCOPLASMA: CPT | Mod: 59 | Performed by: OBSTETRICS & GYNECOLOGY

## 2019-11-07 PROCEDURE — 87109 MYCOPLASMA: CPT | Performed by: OBSTETRICS & GYNECOLOGY

## 2019-11-07 PROCEDURE — 88305 TISSUE EXAM BY PATHOLOGIST: CPT | Performed by: OBSTETRICS & GYNECOLOGY

## 2019-11-07 PROCEDURE — G0145 SCR C/V CYTO,THINLAYER,RESCR: HCPCS | Performed by: OBSTETRICS & GYNECOLOGY

## 2019-11-07 PROCEDURE — 87210 SMEAR WET MOUNT SALINE/INK: CPT | Performed by: OBSTETRICS & GYNECOLOGY

## 2019-11-07 PROCEDURE — 88312 SPECIAL STAINS GROUP 1: CPT | Performed by: OBSTETRICS & GYNECOLOGY

## 2019-11-07 PROCEDURE — 87624 HPV HI-RISK TYP POOLED RSLT: CPT | Performed by: OBSTETRICS & GYNECOLOGY

## 2019-11-07 PROCEDURE — 87491 CHLMYD TRACH DNA AMP PROBE: CPT | Performed by: OBSTETRICS & GYNECOLOGY

## 2019-11-07 PROCEDURE — 56606 BIOPSY OF VULVA/PERINEUM: CPT | Performed by: OBSTETRICS & GYNECOLOGY

## 2019-11-07 PROCEDURE — 56605 BIOPSY OF VULVA/PERINEUM: CPT | Performed by: OBSTETRICS & GYNECOLOGY

## 2019-11-07 PROCEDURE — 87591 N.GONORRHOEAE DNA AMP PROB: CPT | Performed by: OBSTETRICS & GYNECOLOGY

## 2019-11-07 PROCEDURE — 87102 FUNGUS ISOLATION CULTURE: CPT | Performed by: OBSTETRICS & GYNECOLOGY

## 2019-11-07 RX ORDER — METRONIDAZOLE 500 MG/1
500 TABLET ORAL 3 TIMES DAILY
Qty: 21 TABLET | Refills: 0 | Status: SHIPPED | OUTPATIENT
Start: 2019-11-07 | End: 2019-11-14

## 2019-11-07 ASSESSMENT — MIFFLIN-ST. JEOR: SCORE: 1219.35

## 2019-11-07 NOTE — PROGRESS NOTES
Vulvar Biopsy Procedure Note    Perri Scherer  1965  [id number]    The patient was counseled on the risks (including pain/bleeding), benefits (diagnosis, rule out malignancy). Verbal and written consent were obtained.     Technique: The patient was placed in the dorsal lithotomy position.  The vulvar was was cleaned with betadine swabsx3. Local 1% lidocaine was injected into the planned biopsy locations (see exam note). Punch biopsies were obtained and hemostasis was obtained with silver nitrate. The patient tolerated the procedure well and there were no complications. EBL: 0cc.       Lulu Fernandez MD  OB/GYN

## 2019-11-07 NOTE — LETTER
November 14, 2019    Perri Scherer  [address] [place] [id number]    Dear ,  This letter is regarding your recent Pap smear (cervical cancer screening) and Human Papillomavirus (HPV) test.  We are happy to inform you that your Pap smear result is normal. Cervical cancer is closely linked with certain types of HPV. Your results showed no evidence of high-risk HPV.  We recommend you have your next PAP smear and HPV test in 5 years.  You will still need to return to the clinic every year for an annual exam and other preventive tests.  If you have additional questions regarding this result, please call our registered nurse, [place] at [phone].  Sincerely,    Lulu Fernandez MD/richard

## 2019-11-07 NOTE — PROGRESS NOTES
"[facility]  OB/GYN Clinic   Gynecology Consult Note    CC: recurrent vaginitis, vulvar itching     HPI: Ms. Scherer is a 54 year old  being seen for GYN consultation for recurrent vaginitis and vulvar itching.   Today she reports that she has had recurrent yeast infections since age 12 (when she also notes she started smoking). She notices a clear-white discharge and vulvar itching that happens at least once a month. Sometimes every other month. She uses over the counter creams and seeks care when the creams don't improve her symptoms. Most of the time these help, but she feels like even between treatments she has chronic itching, especially on her perineum. Denies hemorrhoids. She uses clobetasol prn for itching. Probably has to apply this once every other week. Denies vulvar mass or lesion.   Took metronidazole; no change. Drainage, itching. Took oral tablets.   She notes almost daily hot flashes and vaginal dryness.   No bowel or bladder symptoms.     GYN Hx: Menopause since 12 years ago. Denies postmenopausal bleeding. Denies any hx of STI or PID. Currently sexually active with one male partner. Had an endometrial ablation in '.   Denies any hx of excessive bleeding with dental work or cuts.   No abnormal PAP smears, but notes missing several screens.No condylomata hx.     As far as her psoriasis, she reports getting one itchy spot on the back of her head, that improves with a cream from her Dermatologist.     ROS: A 10 pt ROS was completed and found to be otherwise negative unless mentioned in the HPI.     PMH:   Past Medical History:   Diagnosis Date     HTN (hypertension), benign      Hypothyroidism      Psoriasis      Smoking        PSHx:   Past Surgical History:   Procedure Laterality Date     TUBAL LIGATION     Endometrial ablation in \"    OBHx:   OB History    Para Term  AB Living   2 2 2 0 0 2   SAB TAB Ectopic Multiple Live Births   0 0 0 0 0      # Outcome " Date GA Lbr Rufino/2nd Weight Sex Delivery Anes PTL Lv   2 Term            1 Term                Medications:   butalbital-acetaminophen-caffeine (FIORICET/ESGIC) -40 MG per tablet, Take 1 tablet by mouth every 6 hours as needed  clobetasol (TEMOVATE) 0.05 % cream, Apply sparingly to affected area twice daily for 14 days.  Do not apply to face. (Patient not taking: Reported on 2019)  levothyroxine (SYNTHROID/LEVOTHROID) 100 MCG tablet, Take 1 tablet (100 mcg) by mouth daily  lisinopril (PRINIVIL/ZESTRIL) 20 MG tablet, TAKE 1 TABLET BY MOUTH DAILY  [] metroNIDAZOLE (FLAGYL) 500 MG tablet, Take 1 tablet (500 mg) by mouth 2 times daily for 7 days  nystatin-triamcinolone (MYCOLOG II) cream, Apply topically 2 times daily (Patient not taking: Reported on 2018)  SUMAtriptan (IMITREX) 50 MG tablet, Take 1 tablet (50 mg) by mouth at onset of headache for migraine May repeat in 2 hours. Max 4 tablets/24 hours. (Patient not taking: Reported on 10/22/2019)  tacrolimus (PROTOPIC) 0.1 % ointment, Apply twice daily as needed. (Patient not taking: Reported on 2019)    No current facility-administered medications on file prior to visit.       Allergies:      Allergies   Allergen Reactions     Norvasc [Amlodipine] Other (See Comments)     Dizziness         Social History:   Social History     Socioeconomic History     Marital status:      Spouse name: Not on file     Number of children: Not on file     Years of education: Not on file     Highest education level: Not on file   Occupational History     Not on file   Social Needs     Financial resource strain: Not on file     Food insecurity:     Worry: Not on file     Inability: Not on file     Transportation needs:     Medical: Not on file     Non-medical: Not on file   Tobacco Use     Smoking status: Current Every Day Smoker     Types: Other     Last attempt to quit: 2017     Years since quittin.0     Smokeless tobacco: Never Used      Tobacco comment:   otherwise E-cigs only   Substance and Sexual Activity     Alcohol use: Yes     Comment: weekends mostly.  10 beers per week     Drug use: No     Sexual activity: Yes     Partners: Male     Birth control/protection: Surgical   Lifestyle     Physical activity:     Days per week: Not on file     Minutes per session: Not on file     Stress: Not on file   Relationships     Social connections:     Talks on phone: Not on file     Gets together: Not on file     Attends Episcopal service: Not on file     Active member of club or organization: Not on file     Attends meetings of clubs or organizations: Not on file     Relationship status: Not on file     Intimate partner violence:     Fear of current or ex partner: Not on file     Emotionally abused: Not on file     Physically abused: Not on file     Forced sexual activity: Not on file   Other Topics Concern     Parent/sibling w/ CABG, MI or angioplasty before 65F 55M? No   Social History Narrative     Not on file     Social History     Socioeconomic History     Marital status:      Spouse name: Not on file     Number of children: Not on file     Years of education: Not on file     Highest education level: Not on file   Occupational History     Not on file   Social Needs     Financial resource strain: Not on file     Food insecurity:     Worry: Not on file     Inability: Not on file     Transportation needs:     Medical: Not on file     Non-medical: Not on file   Tobacco Use     Smoking status: Current Every Day Smoker     Types: Other     Last attempt to quit: 2017     Years since quittin.0     Smokeless tobacco: Never Used     Tobacco comment:   otherwise E-cigs only   Substance and Sexual Activity     Alcohol use: Yes     Comment: weekends mostly.  10 beers per week     Drug use: No     Sexual activity: Yes     Partners: Male     Birth control/protection: Surgical   Lifestyle     Physical activity:     Days per week: Not on file      "Minutes per session: Not on file     Stress: Not on file   Relationships     Social connections:     Talks on phone: Not on file     Gets together: Not on file     Attends Scientology service: Not on file     Active member of club or organization: Not on file     Attends meetings of clubs or organizations: Not on file     Relationship status: Not on file     Intimate partner violence:     Fear of current or ex partner: Not on file     Emotionally abused: Not on file     Physically abused: Not on file     Forced sexual activity: Not on file   Other Topics Concern     Parent/sibling w/ CABG, MI or angioplasty before 65F 55M? No   Social History Narrative     Not on file       Family History:   Family History   Problem Relation Age of Onset     Diabetes Mother      C.A.D. Father      Alzheimer Disease Maternal Grandfather      Substance Abuse Brother      Other - See Comments Sister         plane crash     Melanoma No family hx of    Denies family hx of endometrial, ovarian, cervical or breast cancer.     Physical Exam:   Vitals:    11/07/19 0935   BP: 125/75   BP Location: Right arm   Patient Position: Chair   Cuff Size: Adult Regular   Pulse: 69   Resp: 16   Temp: 96.8  F (36  C)   TempSrc: Tympanic   Weight: 64.2 kg (141 lb 9.6 oz)   Height: 1.613 m (5' 3.5\")      Estimated body mass index is 24.76 kg/m  as calculated from the following:    Height as of 10/22/19: 1.613 m (5' 3.5\").    Weight as of 10/22/19: 64.4 kg (142 lb).    Gen: Pleasant, talkative female in no apparent distress   Respiratory: breathing comfortably on room air   Cardiac: Regular rate, warm and well-perfused.   GI: Abd soft and non-tender  : Mons and labia majora with diffuse mild erythema. Mild erythema at the urethra with mild vaginal atrophy. Normal vulvar architecture is maintained. The skin at the perineum is mildly thickened with several small superficial fissures. Two biopsies obtained here. There is light area of whitening near the right " side of the urethra with small superficial fissures. Biopsy was obtained here. Speculum exam with normal vagina and cervix with scant thin white discharge.   Rectal: no masses or hemorrhoids visually appreciated  MSK: Grossly normal movement of all four extremities  Psych: mood and affect bright     A&P: Ms. Scherer is a 54 year old  being seen for GYN consultation for recurrent vaginitis and vulvar itching.   Vaginal swabs collected include wet prep, mycoplasma/ureaplasma, GC/Chlam and yeast culture.   Biopsies taken due to skin changes as noted in the exam portion - rule out malignancy and aide with diagnosis.   Discussed that she may need a prolonged treatment for BV, and that this may put her at risk for a yeast infection. Discussed that she may need suppressive therapy if she is getting monthly yeast infections.   Might also benefits from some topical estrogen, given the mild atrophy.   Will mail vulvar cares handout.     Health maintenance: PAP smear collected    Will call to discuss results: [phone] (call work phone).     45 minutes was spent face-to-face with the patient, greater than 50% of the time was spent in counseling/coordinating of care as noted above in the A&P and does not include the time spent on the vulvar biopsies.     Lulu Fernandez MD  OB/GYN  2019

## 2019-11-07 NOTE — NURSING NOTE
"Initial /75 (BP Location: Right arm, Patient Position: Chair, Cuff Size: Adult Regular)   Pulse 69   Temp 96.8  F (36  C) (Tympanic)   Resp 16   Ht 1.613 m (5' 3.5\")   Wt 64.2 kg (141 lb 9.6 oz)   BMI 24.69 kg/m   Estimated body mass index is 24.69 kg/m  as calculated from the following:    Height as of this encounter: 1.613 m (5' 3.5\").    Weight as of this encounter: 64.2 kg (141 lb 9.6 oz). .      "

## 2019-11-08 LAB
BACTERIA SPEC CULT: ABNORMAL
C TRACH DNA SPEC QL NAA+PROBE: NEGATIVE
N GONORRHOEA DNA SPEC QL NAA+PROBE: NEGATIVE
SPECIMEN SOURCE: ABNORMAL
SPECIMEN SOURCE: NORMAL
SPECIMEN SOURCE: NORMAL

## 2019-11-11 LAB
COPATH REPORT: NORMAL
Lab: NORMAL
SPECIMEN SOURCE: NORMAL
YEAST SPEC QL CULT: NORMAL

## 2019-11-12 LAB
COPATH REPORT: NORMAL
PAP: NORMAL

## 2019-11-13 ENCOUNTER — DOCUMENTATION ONLY (OUTPATIENT)
Dept: OBGYN | Facility: CLINIC | Age: 54
End: 2019-11-13

## 2019-11-13 LAB
FINAL DIAGNOSIS: NORMAL
HPV HR 12 DNA CVX QL NAA+PROBE: NEGATIVE
HPV16 DNA SPEC QL NAA+PROBE: NEGATIVE
HPV18 DNA SPEC QL NAA+PROBE: NEGATIVE
SPECIMEN DESCRIPTION: NORMAL
SPECIMEN SOURCE CVX/VAG CYTO: NORMAL

## 2019-11-15 LAB
BACTERIA SPEC CULT: NORMAL
SPECIMEN SOURCE: NORMAL

## 2019-11-27 ENCOUNTER — TELEPHONE (OUTPATIENT)
Dept: OBGYN | Facility: CLINIC | Age: 54
End: 2019-11-27

## 2019-11-27 NOTE — TELEPHONE ENCOUNTER
"Reason for call:  Patient reporting a symptom    Symptom or request: Onced everything was \"cleared up\" there was a pill to put her on to prevent yeast infection.  Calling now - thinks everything is cleared up - can she just resume normal life or next recommendations?    Duration (how long have symptoms been present):     Have you been treated for this before? Yes    Additional comments:     Phone Number patient can be reached at:  Work number on file:  [phone] (work)-direct to pt    Best Time:  any    Can we leave a detailed message on this number:  YES please    Call taken on 11/27/2019 at 9:04 AM by Sepideh Saunders    "

## 2019-11-27 NOTE — TELEPHONE ENCOUNTER
"Last office visit 11/7/19.  GYN consult for recurrent vaginitis and vulvar itching  Patient reports \" everything has cleared up down there.\"    Patient inquiring on next steps.  Does she need Diflucan for preventative treatment?    Please review and advise.    Thank you.    Glenny Plata   Ob/Gyn Clinic  RN      "

## 2019-12-20 ENCOUNTER — OFFICE VISIT (OUTPATIENT)
Dept: FAMILY MEDICINE | Facility: CLINIC | Age: 54
End: 2019-12-20
Payer: COMMERCIAL

## 2019-12-20 VITALS
OXYGEN SATURATION: 99 % | DIASTOLIC BLOOD PRESSURE: 70 MMHG | RESPIRATION RATE: 10 BRPM | WEIGHT: 143.8 LBS | TEMPERATURE: 98.9 F | SYSTOLIC BLOOD PRESSURE: 118 MMHG | BODY MASS INDEX: 24.55 KG/M2 | HEIGHT: 64 IN | HEART RATE: 93 BPM

## 2019-12-20 DIAGNOSIS — J01.90 ACUTE SINUSITIS WITH SYMPTOMS > 10 DAYS: Primary | ICD-10-CM

## 2019-12-20 DIAGNOSIS — N76.0 BACTERIAL VAGINOSIS: Primary | ICD-10-CM

## 2019-12-20 DIAGNOSIS — B96.89 BACTERIAL VAGINOSIS: Primary | ICD-10-CM

## 2019-12-20 LAB
DEPRECATED S PYO AG THROAT QL EIA: NORMAL
SPECIMEN SOURCE: NORMAL

## 2019-12-20 PROCEDURE — 87880 STREP A ASSAY W/OPTIC: CPT | Performed by: NURSE PRACTITIONER

## 2019-12-20 PROCEDURE — 87081 CULTURE SCREEN ONLY: CPT | Performed by: NURSE PRACTITIONER

## 2019-12-20 PROCEDURE — 99213 OFFICE O/P EST LOW 20 MIN: CPT | Performed by: NURSE PRACTITIONER

## 2019-12-20 RX ORDER — METRONIDAZOLE 7.5 MG/G
1 GEL VAGINAL
Qty: 80 G | Refills: 0 | Status: SHIPPED | OUTPATIENT
Start: 2019-12-23 | End: 2020-02-21

## 2019-12-20 ASSESSMENT — MIFFLIN-ST. JEOR: SCORE: 1229.33

## 2019-12-20 NOTE — NURSING NOTE
"Initial /70 (BP Location: Right arm, Patient Position: Sitting, Cuff Size: Adult Regular)   Pulse 93   Temp 98.9  F (37.2  C) (Tympanic)   Resp 10   Ht 1.613 m (5' 3.5\")   Wt 65.2 kg (143 lb 12.8 oz)   SpO2 99%   BMI 25.07 kg/m   Estimated body mass index is 25.07 kg/m  as calculated from the following:    Height as of this encounter: 1.613 m (5' 3.5\").    Weight as of this encounter: 65.2 kg (143 lb 12.8 oz). .      "

## 2019-12-20 NOTE — TELEPHONE ENCOUNTER
Lulu Fernandez MD  You 23 minutes ago (2:56 PM)      Can you let her know that I sent a script for prevention of BV.   -Lulu     Routing comment

## 2019-12-20 NOTE — PROGRESS NOTES
Subjective     Perri Scherer is a 54 year old female who presents to clinic today for the following health issues:    HPI   ENT Symptoms             Symptoms: cc Present Absent Comment   Fever/Chills  x     Fatigue  x     Muscle Aches   x    Eye Irritation  x     Sneezing  x     Nasal Chandrakant/Drg  x     Sinus Pressure/Pain  x     Loss of smell   x    Dental pain   x    Sore Throat  x     Swollen Glands  x     Ear Pain/Fullness   x    Cough  x     Wheeze  x     Chest Pain   x    Shortness of breath   x    Rash   x    Other         Symptom duration: Started before    Symptom severity: Moderate   Treatments tried: Ibu   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       Patient Active Problem List   Diagnosis     Nicotine use disorder     Family history of diabetes mellitus     Hyperlipidemia LDL goal <160     Hypothyroidism     Benign essential hypertension     Hypothyroidism, unspecified type     Other migraine without status migrainosus, not intractable     Past Surgical History:   Procedure Laterality Date     TUBAL LIGATION         Social History     Tobacco Use     Smoking status: Current Every Day Smoker     Packs/day: 0.00     Types: Other     Last attempt to quit: 2017     Years since quittin.1     Smokeless tobacco: Never Used     Tobacco comment: E-cig 3mg   Substance Use Topics     Alcohol use: Yes     Comment: weekends mostly.  10 beers per week     Family History   Problem Relation Age of Onset     Diabetes Mother      C.A.D. Father      Alzheimer Disease Maternal Grandfather      Substance Abuse Brother      Other - See Comments Sister         plane crash     Melanoma No family hx of          Current Outpatient Medications   Medication Sig Dispense Refill     amoxicillin-clavulanate (AUGMENTIN) 875-125 MG tablet Take 1 tablet by mouth 2 times daily for 10 days 20 tablet 0     clobetasol (TEMOVATE) 0.05 % cream Apply sparingly to affected area  "twice daily for 14 days.  Do not apply to face. 30 g 0     levothyroxine (SYNTHROID/LEVOTHROID) 100 MCG tablet Take 1 tablet (100 mcg) by mouth daily 90 tablet 2     lisinopril (PRINIVIL/ZESTRIL) 20 MG tablet TAKE 1 TABLET BY MOUTH DAILY 90 tablet 2     nystatin-triamcinolone (MYCOLOG II) cream Apply topically 2 times daily 60 g 1     butalbital-acetaminophen-caffeine (FIORICET/ESGIC) -40 MG per tablet Take 1 tablet by mouth every 6 hours as needed (Patient not taking: Reported on 11/7/2019) 15 tablet 0     [START ON 12/23/2019] metroNIDAZOLE (METROGEL) 0.75 % vaginal gel Place 1 applicator (5 g) vaginally twice a week 80 g 0     SUMAtriptan (IMITREX) 50 MG tablet Take 1 tablet (50 mg) by mouth at onset of headache for migraine May repeat in 2 hours. Max 4 tablets/24 hours. (Patient not taking: Reported on 10/22/2019) 9 tablet 1     tacrolimus (PROTOPIC) 0.1 % ointment Apply twice daily as needed. (Patient not taking: Reported on 5/21/2019) 30 g 1       Reviewed and updated as needed this visit by Provider  Tobacco  Allergies  Meds  Problems  Med Hx  Surg Hx  Fam Hx         Review of Systems   ROS COMP: Constitutional, HEENT, cardiovascular, pulmonary, gi and gu systems are negative, except as otherwise noted.      Objective    /70 (BP Location: Right arm, Patient Position: Sitting, Cuff Size: Adult Regular)   Pulse 93   Temp 98.9  F (37.2  C) (Tympanic)   Resp 10   Ht 1.613 m (5' 3.5\")   Wt 65.2 kg (143 lb 12.8 oz)   SpO2 99%   BMI 25.07 kg/m    Body mass index is 25.07 kg/m .  Physical Exam  Vitals signs and nursing note reviewed.   Constitutional:       Appearance: Normal appearance.   HENT:      Head: Normocephalic and atraumatic.      Right Ear: Tympanic membrane and ear canal normal.      Left Ear: Tympanic membrane and ear canal normal.      Nose: No rhinorrhea.      Right Sinus: Maxillary sinus tenderness present. No frontal sinus tenderness.      Left Sinus: Maxillary sinus " "tenderness present. No frontal sinus tenderness.      Mouth/Throat:      Lips: Pink.      Mouth: Mucous membranes are moist.      Pharynx: Oropharynx is clear.   Eyes:      General: Lids are normal.      Conjunctiva/sclera: Conjunctivae normal.      Comments: Non icteric   Neck:      Musculoskeletal: Neck supple.   Cardiovascular:      Rate and Rhythm: Normal rate and regular rhythm.      Pulses: Normal pulses.      Heart sounds: Normal heart sounds, S1 normal and S2 normal.   Pulmonary:      Effort: Pulmonary effort is normal.      Breath sounds: Normal breath sounds and air entry.   Lymphadenopathy:      Cervical: No cervical adenopathy.   Skin:     General: Skin is warm and dry.   Neurological:      General: No focal deficit present.      Mental Status: She is alert and oriented to person, place, and time.   Psychiatric:         Mood and Affect: Mood normal.         Behavior: Behavior normal.         Thought Content: Thought content normal.         Judgment: Judgment normal.          Diagnostic Test Results:  Labs reviewed in Epic  none         Assessment & Plan     1. Acute sinusitis with symptoms > 10 days    - Strep, Rapid Screen  - amoxicillin-clavulanate (AUGMENTIN) 875-125 MG tablet; Take 1 tablet by mouth 2 times daily for 10 days  Dispense: 20 tablet; Refill: 0  - Beta strep group A culture     BMI:   Estimated body mass index is 25.07 kg/m  as calculated from the following:    Height as of this encounter: 1.613 m (5' 3.5\").    Weight as of this encounter: 65.2 kg (143 lb 12.8 oz).           See Patient Instructions    Return in about 1 week (around 12/27/2019).    KATEY Zamora [facility]    "

## 2019-12-20 NOTE — PATIENT INSTRUCTIONS
"I think this is a likely bacterial infection.  I have sent a prescription for Augmentin to your pharmacy.  Take this twice daily with food. Take a probiotic such as Culturelle or Florastor while on the antibiotic or eat a Greek yogurt containing \"live active cultures\" daily.       For relief of your symptoms:     Use Wilber's vapor rub on your nostrils to promote air flow through your nose    Take hot steam showers/baths at least 2x per day until sinuses are cleared    Apply warm packs to the face.      Drink plenty of fluids to assist with clearing of secretions    Take Sudafed twice daily     Use afrin spray as prescribed for nasal congestion for the next 3 days.     Take Tylenol or Ibuprofen for pain. Do not take more than: Tylenol 1000 mg 4 times a day = 4000 mg per day. Ibuprofen 600 mg 5 times a day = 3000 mg WITH FOOD    Nasal saline rinses/sprays 1-2 times daily. Obtain nasal saline spray (Ayr or Ocean are brand names).  Get into a hot shower, and wait for the sensation of your sinuses \"opening\". Occlude one side of your nose, and use a gentle spray of saline into the opposite side of your nose.  Then blow your nose to try to mobilize the nasal secretions.    Please take your medicines as recommended above and review the discharge instructions for concerning signs/symptoms that would require your prompt return to the clinic for further evaluation. Please follow up in clinic as we have recommended below.  If your symptoms worsen prior to your follow up appointment, do not hesitate to return here to the clinic or emergency department for further evaluation.      "

## 2019-12-21 LAB
BACTERIA SPEC CULT: NORMAL
SPECIMEN SOURCE: NORMAL

## 2020-02-05 ENCOUNTER — TELEPHONE (OUTPATIENT)
Dept: OBGYN | Facility: CLINIC | Age: 55
End: 2020-02-05

## 2020-02-05 NOTE — TELEPHONE ENCOUNTER
Panel Management Review    Health Maintenance List    Health Maintenance   Topic Date Due     HEPATITIS C SCREENING  1965     HIV SCREENING  10/22/1980     PNEUMOCOCCAL IMMUNIZATION 19-64 MEDIUM RISK (1 of 1 - PPSV23) 10/22/1984     MAMMO SCREENING  12/27/2013     ZOSTER IMMUNIZATION (1 of 2) 10/22/2015     PREVENTIVE CARE VISIT  01/19/2016     INFLUENZA VACCINE (1) 09/01/2019     PHQ-2  01/01/2020     LIPID  07/24/2020     HPV TEST  11/07/2024     PAP  11/07/2024     DTAP/TDAP/TD IMMUNIZATION (4 - Td) 05/21/2029     COLONOSCOPY  11/07/2029     IPV IMMUNIZATION  Aged Out     MENINGITIS IMMUNIZATION  Aged Out       Composite cancer screening  Chart review shows that this patient is due/due soon for the following Mammogram  Lab Results   Component Value Date    PAP NIL 11/07/2019     Past Surgical History:   Procedure Laterality Date     TUBAL LIGATION  1992       Is hysterectomy listed in surgical history? No   Is mastectomy listed in surgical history? No     Summary:    Patient is due/failing the following:   Mammogram    Action needed: Mammogram Due    Type of outreach:  Sent letter.      Staff Signature:  Guerda Styles LPN

## 2020-02-05 NOTE — LETTER
Perri Scherer  [address] [facility] [address]      Perri Scherer,    We have records showing that you are overdue for a Mammogram. If you have done one at an outside clinic we would appreciate if you could send the records to us. Our direct clinic fax number is [phone]. Please call [phone] to schedule your Mammogram.    Thank you,     Guerda Stlyes LPN

## 2020-07-27 DIAGNOSIS — I10 BENIGN ESSENTIAL HYPERTENSION: ICD-10-CM

## 2020-07-29 RX ORDER — LISINOPRIL 20 MG/1
TABLET ORAL
Qty: 30 TABLET | Refills: 0 | OUTPATIENT
Start: 2020-07-29

## 2020-08-03 ENCOUNTER — VIRTUAL VISIT (OUTPATIENT)
Dept: FAMILY MEDICINE | Facility: CLINIC | Age: 55
End: 2020-08-03
Payer: COMMERCIAL

## 2020-08-03 DIAGNOSIS — E03.9 HYPOTHYROIDISM, UNSPECIFIED TYPE: Primary | ICD-10-CM

## 2020-08-03 DIAGNOSIS — I10 BENIGN ESSENTIAL HYPERTENSION: ICD-10-CM

## 2020-08-03 PROCEDURE — 99214 OFFICE O/P EST MOD 30 MIN: CPT | Mod: TEL | Performed by: NURSE PRACTITIONER

## 2020-08-03 RX ORDER — LEVOTHYROXINE SODIUM 100 UG/1
100 TABLET ORAL DAILY
Qty: 90 TABLET | Refills: 3 | Status: SHIPPED | OUTPATIENT
Start: 2020-08-03 | End: 2021-08-07

## 2020-08-03 RX ORDER — LISINOPRIL 20 MG/1
20 TABLET ORAL DAILY
Qty: 90 TABLET | Refills: 3 | Status: SHIPPED | OUTPATIENT
Start: 2020-08-03 | End: 2021-07-13

## 2020-08-03 NOTE — PROGRESS NOTES
"Perri Scherer is a 54 year old female who is being evaluated via a billable video visit.    THIS VIIST WAS CHANGED TO TELEPHONE  The patient has been notified of following:     \"This video (CHANGED TO TELEPHONE DUE TO TECHNICAL ISSUES) visit will be conducted via a call between you and your physician/provider. We have found that certain health care needs can be provided without the need for an in-person physical exam.  This service lets us provide the care you need with a video conversation.  If a prescription is necessary we can send it directly to your pharmacy.  If lab work is needed we can place an order for that and you can then stop by our lab to have the test done at a later time.    Video visits are billed at different rates depending on your insurance coverage.  Please reach out to your insurance provider with any questions.    If during the course of the call the physician/provider feels a video visit is not appropriate, you will not be charged for this service.\"    Patient has given verbal consent for Video visit? Yes  How would you like to obtain your AVS? Mail a copy  If you are dropped from the video visit, the video invite should be resent to: Text to cell phone: [phone]  Will anyone else be joining your video visit? No      Subjective     Perri Scherer is a 54 year old female who presents today via video visit for the following health issues:    HPI    Hypertension Follow-up      Do you check your blood pressure regularly outside of the clinic? No     Are you following a low salt diet? Yes    Are your blood pressures ever more than 140 on the top number (systolic) OR more   than 90 on the bottom number (diastolic), for example 140/90? No      How many servings of fruits and vegetables do you eat daily?  2-3    On average, how many sweetened beverages do you drink each day (Examples: soda, juice, sweet tea, etc.  Do NOT count diet or artificially sweetened beverages)?   0    How many days per " week do you exercise enough to make your heart beat faster? 5    How many minutes a day do you exercise enough to make your heart beat faster? 20 - 29    How many days per week do you miss taking your medication? 0             Hypothyroidism: patient is feeling well but would like her TSH updated.   TSH   Date Value Ref Range Status   2019 1.31 0.40 - 4.00 mU/L Final         Patient Active Problem List   Diagnosis     Nicotine use disorder     Family history of diabetes mellitus     Hyperlipidemia LDL goal <160     Hypothyroidism     Benign essential hypertension     Hypothyroidism, unspecified type     Other migraine without status migrainosus, not intractable     Past Surgical History:   Procedure Laterality Date     TUBAL LIGATION         Social History     Tobacco Use     Smoking status: Current Every Day Smoker     Packs/day: 0.00     Types: Other     Last attempt to quit: 2017     Years since quittin.7     Smokeless tobacco: Never Used     Tobacco comment: E-cig 3mg   Substance Use Topics     Alcohol use: Yes     Comment: weekends mostly.  10 beers per week     Family History   Problem Relation Age of Onset     Diabetes Mother      C.A.D. Father      Alzheimer Disease Maternal Grandfather      Substance Abuse Brother      Other - See Comments Sister         plane crash     Melanoma No family hx of            Reviewed and updated as needed this visit by Provider         Review of Systems   Constitutional, HEENT, cardiovascular, pulmonary, GI, , musculoskeletal, neuro, skin, endocrine and psych systems are negative, except as otherwise noted.      Objective             Physical Exam     GENERAL: Healthy, alert and no distress  EYES: Eyes grossly normal to inspection.  No discharge or erythema, or obvious scleral/conjunctival abnormalities.  RESP: No audible wheeze, cough, or visible cyanosis.  No visible retractions or increased work of breathing.    SKIN: Visible skin clear. No significant  "rash, abnormal pigmentation or lesions.  NEURO: Cranial nerves grossly intact.  Mentation and speech appropriate for age.  PSYCH: Mentation appears normal, affect normal/bright, judgement and insight intact, normal speech and appearance well-groomed.      Diagnostic Test Results:  Labs reviewed in Epic        Assessment & Plan     1. Benign essential hypertension  controlled  - lisinopril (ZESTRIL) 20 MG tablet; Take 1 tablet (20 mg) by mouth daily  Dispense: 90 tablet; Refill: 3  - **Basic metabolic panel FUTURE anytime; Future    2. Hypothyroidism, unspecified type  stable  - **TSH with free T4 reflex FUTURE anytime; Future  - levothyroxine (SYNTHROID/LEVOTHROID) 100 MCG tablet; Take 1 tablet (100 mcg) by mouth daily  Dispense: 90 tablet; Refill: 3     BMI:   Estimated body mass index is 25.07 kg/m  as calculated from the following:    Height as of 12/20/19: 1.613 m (5' 3.5\").    Weight as of 12/20/19: 65.2 kg (143 lb 12.8 oz).               No follow-ups on file.    KATEY Fernandez CNP  [facility]      Telephone call was 6 minutes.       KATEY Fernandez CNP      "

## 2020-08-17 DIAGNOSIS — E03.9 HYPOTHYROIDISM, UNSPECIFIED TYPE: ICD-10-CM

## 2020-08-17 DIAGNOSIS — I10 BENIGN ESSENTIAL HYPERTENSION: ICD-10-CM

## 2020-08-17 LAB
ANION GAP SERPL CALCULATED.3IONS-SCNC: 7 MMOL/L (ref 3–14)
BUN SERPL-MCNC: 13 MG/DL (ref 7–30)
CALCIUM SERPL-MCNC: 9 MG/DL (ref 8.5–10.1)
CHLORIDE SERPL-SCNC: 103 MMOL/L (ref 94–109)
CO2 SERPL-SCNC: 27 MMOL/L (ref 20–32)
CREAT SERPL-MCNC: 0.83 MG/DL (ref 0.52–1.04)
GFR SERPL CREATININE-BSD FRML MDRD: 79 ML/MIN/{1.73_M2}
GLUCOSE SERPL-MCNC: 102 MG/DL (ref 70–99)
POTASSIUM SERPL-SCNC: 4.1 MMOL/L (ref 3.4–5.3)
SODIUM SERPL-SCNC: 137 MMOL/L (ref 133–144)
TSH SERPL DL<=0.005 MIU/L-ACNC: 0.91 MU/L (ref 0.4–4)

## 2020-08-17 PROCEDURE — 80048 BASIC METABOLIC PNL TOTAL CA: CPT | Performed by: NURSE PRACTITIONER

## 2020-08-17 PROCEDURE — 36415 COLL VENOUS BLD VENIPUNCTURE: CPT | Performed by: NURSE PRACTITIONER

## 2020-08-17 PROCEDURE — 84443 ASSAY THYROID STIM HORMONE: CPT | Performed by: NURSE PRACTITIONER

## 2021-05-28 ENCOUNTER — RECORDS - HEALTHEAST (OUTPATIENT)
Dept: ADMINISTRATIVE | Facility: CLINIC | Age: 56
End: 2021-05-28

## 2021-05-29 ENCOUNTER — RECORDS - HEALTHEAST (OUTPATIENT)
Dept: ADMINISTRATIVE | Facility: CLINIC | Age: 56
End: 2021-05-29

## 2021-05-30 ENCOUNTER — RECORDS - HEALTHEAST (OUTPATIENT)
Dept: ADMINISTRATIVE | Facility: CLINIC | Age: 56
End: 2021-05-30

## 2021-06-01 ENCOUNTER — RECORDS - HEALTHEAST (OUTPATIENT)
Dept: ADMINISTRATIVE | Facility: CLINIC | Age: 56
End: 2021-06-01

## 2021-07-07 ENCOUNTER — TELEPHONE (OUTPATIENT)
Dept: FAMILY MEDICINE | Facility: CLINIC | Age: 56
End: 2021-07-07

## 2021-07-07 DIAGNOSIS — I10 BENIGN ESSENTIAL HYPERTENSION: ICD-10-CM

## 2021-07-08 NOTE — TELEPHONE ENCOUNTER
"Requested Prescriptions   Pending Prescriptions Disp Refills     lisinopril (ZESTRIL) 20 MG tablet [Pharmacy Med Name: LISINOPRIL 20MG TABLET] 90 tablet 3     Sig: TAKE 1 TABLET BY MOUTH DAILY       ACE Inhibitors (Including Combos) Protocol Failed - 7/7/2021  1:00 AM        Failed - Blood pressure under 140/90 in past 12 months     BP Readings from Last 3 Encounters:   12/20/19 118/70   11/07/19 125/75   10/22/19 128/66                 Passed - Recent (12 mo) or future (30 days) visit within the authorizing provider's specialty     Patient has had an office visit with the authorizing provider or a provider within the authorizing providers department within the previous 12 mos or has a future within next 30 days. See \"Patient Info\" tab in inbasket, or \"Choose Columns\" in Meds & Orders section of the refill encounter.              Passed - Medication is active on med list        Passed - Patient is age 18 or older        Passed - No active pregnancy on record        Passed - Normal serum creatinine on file in past 12 months     Recent Labs   Lab Test 08/17/20  1512   CR 0.83       Ok to refill medication if creatinine is low          Passed - Normal serum potassium on file in past 12 months     Recent Labs   Lab Test 08/17/20  1512   POTASSIUM 4.1             Passed - No positive pregnancy test within past 12 months             "

## 2021-07-13 ENCOUNTER — RECORDS - HEALTHEAST (OUTPATIENT)
Dept: ADMINISTRATIVE | Facility: CLINIC | Age: 56
End: 2021-07-13

## 2021-07-13 RX ORDER — LISINOPRIL 20 MG/1
TABLET ORAL
Qty: 30 TABLET | Refills: 0 | Status: SHIPPED | OUTPATIENT
Start: 2021-07-13 | End: 2021-08-12

## 2021-07-15 ENCOUNTER — TELEPHONE (OUTPATIENT)
Dept: FAMILY MEDICINE | Facility: CLINIC | Age: 56
End: 2021-07-15

## 2021-07-15 DIAGNOSIS — E03.9 HYPOTHYROIDISM, UNSPECIFIED TYPE: Primary | ICD-10-CM

## 2021-07-15 NOTE — TELEPHONE ENCOUNTER
Patient called back and was notified. Patient set up appointment for 08/16/2021    Evangelina Samaniego on 7/15/2021 at 12:52 PM

## 2021-07-15 NOTE — TELEPHONE ENCOUNTER
Reason for Call:  Other     Detailed comments: Patient is calling asking if he can get a lab blood draw order for her yearly thyroid check.     Phone Number Patient can be reached at: Home number on file [phone] (home)    Best Time: any    Can we leave a detailed message on this number? YES    Call taken on 7/15/2021 at 12:53 PM by Evangelina Samaniego

## 2021-07-15 NOTE — TELEPHONE ENCOUNTER
Has appointment scheduled for 8/16/21. Please add any other orders and sign. Thank you!    Cathy Lin RN

## 2021-07-21 ENCOUNTER — RECORDS - HEALTHEAST (OUTPATIENT)
Dept: ADMINISTRATIVE | Facility: CLINIC | Age: 56
End: 2021-07-21

## 2021-08-06 DIAGNOSIS — E03.9 HYPOTHYROIDISM, UNSPECIFIED TYPE: ICD-10-CM

## 2021-08-07 RX ORDER — LEVOTHYROXINE SODIUM 100 UG/1
TABLET ORAL
Qty: 90 TABLET | Refills: 0 | Status: SHIPPED | OUTPATIENT
Start: 2021-08-07 | End: 2021-08-16

## 2021-08-16 ENCOUNTER — OFFICE VISIT (OUTPATIENT)
Dept: FAMILY MEDICINE | Facility: CLINIC | Age: 56
End: 2021-08-16
Payer: COMMERCIAL

## 2021-08-16 VITALS
OXYGEN SATURATION: 97 % | TEMPERATURE: 98 F | DIASTOLIC BLOOD PRESSURE: 88 MMHG | HEIGHT: 64 IN | HEART RATE: 76 BPM | WEIGHT: 130.8 LBS | BODY MASS INDEX: 22.33 KG/M2 | SYSTOLIC BLOOD PRESSURE: 128 MMHG

## 2021-08-16 DIAGNOSIS — I10 BENIGN ESSENTIAL HYPERTENSION: ICD-10-CM

## 2021-08-16 DIAGNOSIS — E03.9 HYPOTHYROIDISM, UNSPECIFIED TYPE: ICD-10-CM

## 2021-08-16 DIAGNOSIS — G43.809 OTHER MIGRAINE WITHOUT STATUS MIGRAINOSUS, NOT INTRACTABLE: Primary | ICD-10-CM

## 2021-08-16 LAB
ANION GAP SERPL CALCULATED.3IONS-SCNC: 3 MMOL/L (ref 3–14)
BUN SERPL-MCNC: 10 MG/DL (ref 7–30)
CALCIUM SERPL-MCNC: 9.4 MG/DL (ref 8.5–10.1)
CHLORIDE BLD-SCNC: 107 MMOL/L (ref 94–109)
CHOLEST SERPL-MCNC: 233 MG/DL
CO2 SERPL-SCNC: 29 MMOL/L (ref 20–32)
CREAT SERPL-MCNC: 0.72 MG/DL (ref 0.52–1.04)
FASTING STATUS PATIENT QL REPORTED: YES
GFR SERPL CREATININE-BSD FRML MDRD: >90 ML/MIN/1.73M2
GLUCOSE BLD-MCNC: 102 MG/DL (ref 70–99)
HDLC SERPL-MCNC: 75 MG/DL
LDLC SERPL CALC-MCNC: 138 MG/DL
NONHDLC SERPL-MCNC: 158 MG/DL
POTASSIUM BLD-SCNC: 4.4 MMOL/L (ref 3.4–5.3)
SODIUM SERPL-SCNC: 139 MMOL/L (ref 133–144)
TRIGL SERPL-MCNC: 102 MG/DL
TSH SERPL DL<=0.005 MIU/L-ACNC: 2.52 MU/L (ref 0.4–4)

## 2021-08-16 PROCEDURE — 84443 ASSAY THYROID STIM HORMONE: CPT | Performed by: NURSE PRACTITIONER

## 2021-08-16 PROCEDURE — 80048 BASIC METABOLIC PNL TOTAL CA: CPT | Performed by: NURSE PRACTITIONER

## 2021-08-16 PROCEDURE — 36415 COLL VENOUS BLD VENIPUNCTURE: CPT | Performed by: NURSE PRACTITIONER

## 2021-08-16 PROCEDURE — 99214 OFFICE O/P EST MOD 30 MIN: CPT | Performed by: NURSE PRACTITIONER

## 2021-08-16 PROCEDURE — 80061 LIPID PANEL: CPT | Performed by: NURSE PRACTITIONER

## 2021-08-16 RX ORDER — LEVOTHYROXINE SODIUM 100 UG/1
100 TABLET ORAL DAILY
Qty: 90 TABLET | Refills: 3 | Status: SHIPPED | OUTPATIENT
Start: 2021-08-16 | End: 2022-08-03

## 2021-08-16 RX ORDER — SUMATRIPTAN 50 MG/1
50 TABLET, FILM COATED ORAL
Qty: 9 TABLET | Refills: 1 | Status: SHIPPED | OUTPATIENT
Start: 2021-08-16

## 2021-08-16 RX ORDER — LISINOPRIL 20 MG/1
20 TABLET ORAL DAILY
Qty: 90 TABLET | Refills: 3 | Status: SHIPPED | OUTPATIENT
Start: 2021-08-16 | End: 2022-08-03

## 2021-08-16 ASSESSMENT — MIFFLIN-ST. JEOR: SCORE: 1166.95

## 2021-08-16 NOTE — PATIENT INSTRUCTIONS
Thank you for choosing [facility].  You may be receiving an email and/or telephone survey request from [facility] [employer] regarding your visit today.  Please take a few minutes to respond to the survey to let us know how we are doing.      If you have questions or concerns, please contact us via [employer] or you can contact your care team at [phone] option 2.    Our Clinic hours are:  Monday - Thursday 7am-6pm  Friday 7am-5pm    The [place] outpatient lab hours are:  Monday - Friday 7am-4:30pm    Appointments are required, call [phone]    If you have clinical questions after hours or would like to schedule an appointment,  call the clinic at [phone].

## 2021-08-16 NOTE — LETTER
August 16, 2021      Perri Scherer  [address] [place] [facility] [address]        Dear ,    We are writing to inform you of your test results.    All labs are stable- cholesterol is slightly higher- recommend eating more fish/chicken in diet/ and try to exercise 30 minutes daily       Resulted Orders   Lipid panel reflex to direct LDL Fasting   Result Value Ref Range    Cholesterol 233 (H) <200 mg/dL      Comment:      Age 0-19 years  Desirable: <170 mg/dL  Borderline high:  170-199 mg/dl  High:            >199 mg/dl    Age 20 years and older  Desirable: <200 mg/dL    Triglycerides 102 <150 mg/dL      Comment:      0-9 years:  Normal:    Less than 75 mg/dL  Borderline high:  75-99 mg/dL  High:             Greater than or equal to 100 mg/dL    0-19 years:  Normal:    Less than 90 mg/dL  Borderline high:   mg/dL  High:             Greater than or equal to 130 mg/dL    20 years and older:  Normal:    Less than 150 mg/dL  Borderline high:  150-199 mg/dL  High:             200-499 mg/dL  Very high:   Greater than or equal to 500 mg/dL    Direct Measure HDL 75 >=50 mg/dL      Comment:      0-19 years:       Greater than or equal to 45 mg/dL   Low: Less than 40 mg/dL   Borderline low: 40-44 mg/dL     20 years and older:   Female: Greater than or equal to 50 mg/dL   Male:   Greater than or equal to 40 mg/dL         LDL Cholesterol Calculated 138 (H) <=100 mg/dL      Comment:      Age 0-19 years:  Desirable: 0-110 mg/dL   Borderline high: 110-129 mg/dL   High: >= 130 mg/dL    Age 20 years and older:  Desirable: <100mg/dL  Above desirable: 100-129 mg/dL   Borderline high: 130-159 mg/dL   High: 160-189 mg/dL   Very high: >= 190 mg/dL    Non HDL Cholesterol 158 (H) <130 mg/dL      Comment:      0-19 years:  Desirable:          Less than 120 mg/dL  Borderline high:   120-144 mg/dL  High:                   Greater than or equal to 145 mg/dL    20 years and older:  Desirable:          130 mg/dL  Above  Desirable: 130-159 mg/dL  Borderline high:   160-189 mg/dL  High:               190-219 mg/dL  Very high:     Greater than or equal to 220 mg/dL    Patient Fasting > 8hrs? Yes    Basic metabolic panel  (Ca, Cl, CO2, Creat, Gluc, K, Na, BUN)   Result Value Ref Range    Sodium 139 133 - 144 mmol/L    Potassium 4.4 3.4 - 5.3 mmol/L    Chloride 107 94 - 109 mmol/L    Carbon Dioxide (CO2) 29 20 - 32 mmol/L    Anion Gap 3 3 - 14 mmol/L    Urea Nitrogen 10 7 - 30 mg/dL    Creatinine 0.72 0.52 - 1.04 mg/dL    Calcium 9.4 8.5 - 10.1 mg/dL    Glucose 102 (H) 70 - 99 mg/dL    GFR Estimate >90 >60 mL/min/1.73m2      Comment:      As of July 11, 2021, eGFR is calculated by the CKD-EPI creatinine equation, without race adjustment. eGFR can be influenced by muscle mass, exercise, and diet. The reported eGFR is an estimation only and is only applicable if the renal function is stable.   TSH with free T4 reflex   Result Value Ref Range    TSH 2.52 0.40 - 4.00 mU/L       If you have any questions or concerns, please call the clinic at the number listed above.       Sincerely,      KATEY Fernandez CNP

## 2021-08-16 NOTE — PROGRESS NOTES
Assessment & Plan     Benign essential hypertension  Controlled   - refilled lisinopril (ZESTRIL) 20 MG tablet; Take 1 tablet (20 mg) by mouth daily  - Lipid panel reflex to direct LDL Fasting  - Basic metabolic panel  (Ca, Cl, CO2, Creat, Gluc, K, Na, BUN)    Hypothyroidism, unspecified type  stable  - refilled levothyroxine (SYNTHROID/LEVOTHROID) 100 MCG tablet; Take 1 tablet (100 mcg) by mouth daily  - TSH with free T4 reflex  - TSH with free T4 reflex; Future    Other migraine without status migrainosus, not intractable  Well controlled   -Refilled  SUMAtriptan (IMITREX) 50 MG tablet; Take 1 tablet (50 mg) by mouth at onset of headache for migraine May repeat in 2 hours. Max 4 tablets/24 hours.          No follow-ups on file.    KATEY Fernandez [facility] EMILY Rayo is a 55 year old who presents for the following health issues     HPI     Hypertension Follow-up      Do you check your blood pressure regularly outside of the clinic? No     Are you following a low salt diet? Yes    Are your blood pressures ever more than 140 on the top number (systolic) OR more   than 90 on the bottom number (diastolic), for example 140/90? No    Migraine     Since your last clinic visit, how have your headaches changed?  Improved, only gets once a year now.     How often are you getting headaches or migraines? Once a year      Are you able to do normal daily activities when you have a migraine? No    Are you taking rescue/relief medications? (Select all that apply) sumatriptan (Imitrex)    How helpful is your rescue/relief medication?  I get only a small amount of relief    Are you taking any medications to prevent migraines? (Select all that apply)  No    In the past 4 weeks, how often have you gone to urgent care or the emergency room because of your headaches?  0    Hypothyroidism Follow-up      Since last visit, patient describes the following symptoms: dry skin      How many  servings of fruits and vegetables do you eat daily?  2-3    On average, how many sweetened beverages do you drink each day (Examples: soda, juice, sweet tea, etc.  Do NOT count diet or artificially sweetened beverages)?   0    How many days per week do you exercise enough to make your heart beat faster? 5    How many minutes a day do you exercise enough to make your heart beat faster? 10 - 19    How many days per week do you miss taking your medication? 0        Review of Systems   Constitutional, HEENT, cardiovascular, pulmonary, GI, , musculoskeletal, neuro, skin, endocrine and psych systems are negative, except as otherwise noted.      Objective    There were no vitals taken for this visit.  There is no height or weight on file to calculate BMI.  Physical Exam   GENERAL: healthy, alert and no distress  NECK: no adenopathy, no asymmetry, masses, or scars and thyroid normal to palpation  RESP: lungs clear to auscultation - no rales, rhonchi or wheezes  CV: regular rate and rhythm, normal S1 S2, no S3 or S4, no murmur, click or rub, no peripheral edema and peripheral pulses strong  MS: no gross musculoskeletal defects noted, no edema

## 2021-12-20 ENCOUNTER — TELEPHONE (OUTPATIENT)
Dept: FAMILY MEDICINE | Facility: CLINIC | Age: 56
End: 2021-12-20
Payer: COMMERCIAL

## 2021-12-20 NOTE — LETTER
December 20, 2021      Perri Scherer  [address] [facility] [address]        Dear Perri,     Your healthcare team cares about your health. To provide you with the best care,   we have reviewed your chart and based on our findings, we see that you are due to:     - BREAST CANCER SCREENING:  Schedule Annual Mammogram. Breast center scheduling number - [phone] or schedule in MyChart (self referall)    If you have already completed these items, please contact the clinic via phone or   [employer] so your care team can review and update your records. Thank you for   choosing [facility] for your healthcare needs. For any questions,   concerns, or to schedule an appointment please contact the clinic.       Healthy Regards,      Your [facility] Care Team

## 2022-08-01 DIAGNOSIS — E03.9 HYPOTHYROIDISM, UNSPECIFIED TYPE: ICD-10-CM

## 2022-08-01 DIAGNOSIS — I10 BENIGN ESSENTIAL HYPERTENSION: ICD-10-CM

## 2022-08-01 NOTE — TELEPHONE ENCOUNTER
Duplicate of other request that was also sent today. Writer is unable to refuse this duplicate request due to no authorizing provider with [facility].    Julio PATEL [facility]

## 2022-08-03 RX ORDER — LEVOTHYROXINE SODIUM 100 UG/1
100 TABLET ORAL DAILY
Qty: 90 TABLET | Refills: 0 | Status: SHIPPED | OUTPATIENT
Start: 2022-08-03 | End: 2022-10-31

## 2022-08-03 RX ORDER — LISINOPRIL 20 MG/1
20 TABLET ORAL DAILY
Qty: 90 TABLET | Refills: 0 | Status: SHIPPED | OUTPATIENT
Start: 2022-08-03 | End: 2022-10-31

## 2022-10-31 DIAGNOSIS — E03.9 HYPOTHYROIDISM, UNSPECIFIED TYPE: ICD-10-CM

## 2022-10-31 DIAGNOSIS — I10 BENIGN ESSENTIAL HYPERTENSION: ICD-10-CM

## 2022-10-31 RX ORDER — LEVOTHYROXINE SODIUM 100 UG/1
100 TABLET ORAL DAILY
Qty: 60 TABLET | Refills: 0 | Status: SHIPPED | OUTPATIENT
Start: 2022-10-31 | End: 2022-11-29

## 2022-10-31 RX ORDER — LEVOTHYROXINE SODIUM 100 UG/1
100 TABLET ORAL DAILY
Qty: 90 TABLET | Refills: 0 | OUTPATIENT
Start: 2022-10-31

## 2022-10-31 RX ORDER — LISINOPRIL 20 MG/1
20 TABLET ORAL DAILY
Qty: 90 TABLET | Refills: 0 | OUTPATIENT
Start: 2022-10-31

## 2022-10-31 RX ORDER — LISINOPRIL 20 MG/1
20 TABLET ORAL DAILY
Qty: 60 TABLET | Refills: 0 | Status: SHIPPED | OUTPATIENT
Start: 2022-10-31 | End: 2022-11-29

## 2022-10-31 NOTE — TELEPHONE ENCOUNTER
Notified patient. Scheduled establishing care appt with Dr Duran on 11/29/22. Patient is asking for short medication refill to get to appt  Miriam Adams on 10/31/2022 at 2:42 PM

## 2022-10-31 NOTE — TELEPHONE ENCOUNTER
I have not seen this patient since 2019, is overdue for follow up. Patient needs to establish with a PCP

## 2022-10-31 NOTE — TELEPHONE ENCOUNTER
"Requested Prescriptions   Pending Prescriptions Disp Refills     lisinopril (ZESTRIL) 20 MG tablet 90 tablet 0     Sig: Take 1 tablet (20 mg) by mouth daily       ACE Inhibitors (Including Combos) Protocol Failed - 10/31/2022 10:25 AM        Failed - Blood pressure under 140/90 in past 12 months     BP Readings from Last 3 Encounters:   08/16/21 128/88   12/20/19 118/70   11/07/19 125/75                 Failed - Recent (12 mo) or future (30 days) visit within the authorizing provider's specialty     Patient has had an office visit with the authorizing provider or a provider within the authorizing providers department within the previous 12 mos or has a future within next 30 days. See \"Patient Info\" tab in inbasket, or \"Choose Columns\" in Meds & Orders section of the refill encounter.              Failed - Normal serum creatinine on file in past 12 months     Recent Labs   Lab Test 08/16/21  0732   CR 0.72       Ok to refill medication if creatinine is low          Failed - Normal serum potassium on file in past 12 months     Recent Labs   Lab Test 08/16/21  0732   POTASSIUM 4.4             Passed - Medication is active on med list        Passed - Patient is age 18 or older        Passed - No active pregnancy on record        Passed - No positive pregnancy test within past 12 months           levothyroxine (SYNTHROID/LEVOTHROID) 100 MCG tablet 90 tablet 0     Sig: Take 1 tablet (100 mcg) by mouth daily       There is no refill protocol information for this order          "

## 2022-11-29 ENCOUNTER — OFFICE VISIT (OUTPATIENT)
Dept: FAMILY MEDICINE | Facility: CLINIC | Age: 57
End: 2022-11-29
Payer: COMMERCIAL

## 2022-11-29 VITALS
BODY MASS INDEX: 19.67 KG/M2 | WEIGHT: 111 LBS | OXYGEN SATURATION: 97 % | HEART RATE: 90 BPM | RESPIRATION RATE: 18 BRPM | TEMPERATURE: 97.8 F | HEIGHT: 63 IN | SYSTOLIC BLOOD PRESSURE: 120 MMHG | DIASTOLIC BLOOD PRESSURE: 70 MMHG

## 2022-11-29 DIAGNOSIS — E03.8 OTHER SPECIFIED HYPOTHYROIDISM: Primary | ICD-10-CM

## 2022-11-29 DIAGNOSIS — E03.9 HYPOTHYROIDISM, UNSPECIFIED TYPE: ICD-10-CM

## 2022-11-29 DIAGNOSIS — I10 BENIGN ESSENTIAL HYPERTENSION: ICD-10-CM

## 2022-11-29 DIAGNOSIS — L40.9 PSORIASIS: ICD-10-CM

## 2022-11-29 LAB
ANION GAP SERPL CALCULATED.3IONS-SCNC: 7 MMOL/L (ref 7–15)
BUN SERPL-MCNC: 10.4 MG/DL (ref 6–20)
CALCIUM SERPL-MCNC: 9.6 MG/DL (ref 8.6–10)
CHLORIDE SERPL-SCNC: 105 MMOL/L (ref 98–107)
CREAT SERPL-MCNC: 0.6 MG/DL (ref 0.51–0.95)
DEPRECATED HCO3 PLAS-SCNC: 29 MMOL/L (ref 22–29)
GFR SERPL CREATININE-BSD FRML MDRD: >90 ML/MIN/1.73M2
GLUCOSE SERPL-MCNC: 90 MG/DL (ref 70–99)
POTASSIUM SERPL-SCNC: 4.3 MMOL/L (ref 3.4–5.3)
SODIUM SERPL-SCNC: 141 MMOL/L (ref 136–145)
T4 FREE SERPL-MCNC: 1.61 NG/DL (ref 0.9–1.7)
TSH SERPL DL<=0.005 MIU/L-ACNC: 0.07 UIU/ML (ref 0.3–4.2)

## 2022-11-29 PROCEDURE — 80048 BASIC METABOLIC PNL TOTAL CA: CPT | Performed by: FAMILY MEDICINE

## 2022-11-29 PROCEDURE — 36415 COLL VENOUS BLD VENIPUNCTURE: CPT | Performed by: FAMILY MEDICINE

## 2022-11-29 PROCEDURE — 84443 ASSAY THYROID STIM HORMONE: CPT | Performed by: FAMILY MEDICINE

## 2022-11-29 PROCEDURE — 84439 ASSAY OF FREE THYROXINE: CPT | Performed by: FAMILY MEDICINE

## 2022-11-29 PROCEDURE — 99214 OFFICE O/P EST MOD 30 MIN: CPT | Performed by: FAMILY MEDICINE

## 2022-11-29 RX ORDER — CLOBETASOL PROPIONATE 0.5 MG/G
CREAM TOPICAL DAILY PRN
Qty: 30 G | Refills: 0 | Status: SHIPPED | OUTPATIENT
Start: 2022-11-29

## 2022-11-29 RX ORDER — LISINOPRIL 20 MG/1
20 TABLET ORAL DAILY
Qty: 90 TABLET | Refills: 3 | Status: SHIPPED | OUTPATIENT
Start: 2022-11-29 | End: 2023-12-01

## 2022-11-29 RX ORDER — LEVOTHYROXINE SODIUM 100 UG/1
100 TABLET ORAL DAILY
Qty: 60 TABLET | Refills: 0 | Status: CANCELLED | OUTPATIENT
Start: 2022-11-29

## 2022-11-29 RX ORDER — LEVOTHYROXINE SODIUM 88 UG/1
88 TABLET ORAL DAILY
Qty: 90 TABLET | Refills: 3 | Status: SHIPPED | OUTPATIENT
Start: 2022-11-29 | End: 2023-01-27

## 2022-11-29 ASSESSMENT — PAIN SCALES - GENERAL: PAINLEVEL: NO PAIN (0)

## 2022-11-29 NOTE — PROGRESS NOTES
Assessment & Plan     Hypothyroidism, unspecified type  Stable. Check TSH today. If within goal range will send script for Levothyroxine 100 mcg daily   - TSH with free T4 reflex    Benign essential hypertension  Stable, no issues. /70 today in clinic. Wishes to continue on current dose. Check labs today. Refill provided.   - lisinopril (ZESTRIL) 20 MG tablet  Dispense: 90 tablet; Refill: 3  - Basic metabolic panel  (Ca, Cl, CO2, Creat, Gluc, K, Na, BUN)    Psoriasis  Spot on head which will flare up at times. Will use clobetasol cream and it resolves. Requesting a refill.   - clobetasol (TEMOVATE) 0.05 % external cream  Dispense: 30 g; Refill: 0      Nicotine/Tobacco Cessation:  She reports that she has been smoking other and cigarettes. She has been smoking an average of .5 packs per day. She has never used smokeless tobacco.  Nicotine/Tobacco Cessation Plan:   Information offered: Patient not interested at this time      Discussed need for other preventive cares and cancer screenings. She is not interested at this time and wishes to have her medications refilled and no further testing.     The risks, benefits and treatment options of prescribed medications or other treatments have been discussed with the patient. The patient verbalized their understanding and should call or follow up if no improvement or if they develop further problems.        Daniel Duran, [facility] EMILY Rayo is a 57 year old, presenting for the following health issues:  Hypertension and Thyroid Problem      History of Present Illness       Hypertension: She presents for follow up of hypertension.  She does not check blood pressure  regularly outside of the clinic. Outpatient blood pressures have not been over 140/90. She does not follow a low salt diet.     Hypothyroidism:     Since last visit, patient describes the following symptoms::  Dry skin and Weight loss    Weight loss::  >20 lbs.     "  57 year old female who presents to clinic to establish care.       Hypertension Follow-up  On Lisinopril 20 mg daily.   BP well controlled today in clinic.  No symptoms.   Active with exercise.   Not willing to check BP at home.     Hypothyroidism Follow-up  On Levothyroxine 100 mcg daily.   Has been on medication for last 14 years.   Denies any issues. Reports taking every day.   Due for labs.         How many servings of fruits and vegetables do you eat daily?  2-3    On average, how many sweetened beverages do you drink each day (Examples: soda, juice, sweet tea, etc.  Do NOT count diet or artificially sweetened beverages)?   0    How many days per week do you exercise enough to make your heart beat faster? 5    How many minutes a day do you exercise enough to make your heart beat faster? 60 or more  How many days per week do you miss taking your medication? 2-Blood pressure    What makes it hard for you to take your medications?  remembering to take    Preventive Health  Discussed need for colon cancer screening, breast cancer screening and patient defers.     Tobacco use  Current smoker, not interested in quitting.       Review of Systems         Objective    /70 (BP Location: Left arm, Patient Position: Chair, Cuff Size: Adult Regular)   Pulse 90   Temp 97.8  F (36.6  C) (Tympanic)   Resp 18   Ht 1.6 m (5' 3\")   Wt 50.3 kg (111 lb)   SpO2 97%   BMI 19.66 kg/m    Body mass index is 19.66 kg/m .  Physical Exam   General: alert, cooperative, no acute distress   CV: RRR, no murmur  Resp: non-labored breathing, clear to auscultation, no wheezing or rales   Extremities: No peripheral edema, calves non-tender.       "

## 2023-01-01 NOTE — NURSING NOTE
"Initial /86 (BP Location: Left arm, Patient Position: Chair, Cuff Size: Adult Regular)  Pulse 78  Temp 98.8  F (37.1  C) (Tympanic)  Ht 5' 3.5\" (1.613 m)  Wt 140 lb (63.5 kg)  SpO2 96%  BMI 24.41 kg/m2 Estimated body mass index is 24.41 kg/(m^2) as calculated from the following:    Height as of this encounter: 5' 3.5\" (1.613 m).    Weight as of this encounter: 140 lb (63.5 kg). .    Danelle Cabrera CMA ([facility])  "
Pupils equal, round and reactive to light, Extra-ocular movement intact, eyes are clear b/l

## 2023-01-27 ENCOUNTER — LAB (OUTPATIENT)
Dept: LAB | Facility: CLINIC | Age: 58
End: 2023-01-27
Payer: MEDICAID

## 2023-01-27 DIAGNOSIS — E03.8 OTHER SPECIFIED HYPOTHYROIDISM: ICD-10-CM

## 2023-01-27 DIAGNOSIS — E03.9 HYPOTHYROIDISM, UNSPECIFIED TYPE: Primary | ICD-10-CM

## 2023-01-27 LAB
T4 FREE SERPL-MCNC: 1.57 NG/DL (ref 0.9–1.7)
TSH SERPL DL<=0.005 MIU/L-ACNC: 0.22 UIU/ML (ref 0.3–4.2)

## 2023-01-27 PROCEDURE — 36415 COLL VENOUS BLD VENIPUNCTURE: CPT

## 2023-01-27 PROCEDURE — 84439 ASSAY OF FREE THYROXINE: CPT

## 2023-01-27 PROCEDURE — 84443 ASSAY THYROID STIM HORMONE: CPT

## 2023-01-27 RX ORDER — LEVOTHYROXINE SODIUM 75 UG/1
75 TABLET ORAL DAILY
Qty: 90 TABLET | Refills: 3 | Status: SHIPPED | OUTPATIENT
Start: 2023-01-27 | End: 2023-11-21

## 2023-02-07 ENCOUNTER — TELEPHONE (OUTPATIENT)
Dept: FAMILY MEDICINE | Facility: CLINIC | Age: 58
End: 2023-02-07
Payer: MEDICAID

## 2023-02-07 NOTE — LETTER
February 7, 2023      Perri Scherer  [address] [facility] [address]      Your healthcare team cares about your health. To provide you with the best care, we have reviewed your chart and based on our findings, we see that you are due to:     PREVENTATIVE VISIT: Physical    If you have already completed these items, please contact the clinic via phone or Mychart so your care team can review and update your records.  Thank you for choosing [facility] for your healthcare needs. For any questions, concerns, or to schedule an appointment please contact the clinic.       Healthy Regards,      Your [facility] Care Team

## 2023-02-07 NOTE — TELEPHONE ENCOUNTER
Patient Quality Outreach    Patient is due for the following:   Colon Cancer Screening  Breast Cancer Screening - Mammogram  Physical Preventive Adult Physical    Next Steps:   Schedule a Annual Wellness Visit    Type of outreach:    Sent letter.      Questions for provider review:    None     Jazmín Selby, [facility]

## 2023-03-16 ENCOUNTER — OFFICE VISIT (OUTPATIENT)
Dept: FAMILY MEDICINE | Facility: CLINIC | Age: 58
End: 2023-03-16
Payer: MEDICAID

## 2023-03-16 VITALS
HEART RATE: 86 BPM | OXYGEN SATURATION: 96 % | WEIGHT: 115.9 LBS | TEMPERATURE: 97.7 F | BODY MASS INDEX: 20.53 KG/M2 | DIASTOLIC BLOOD PRESSURE: 68 MMHG | RESPIRATION RATE: 16 BRPM | SYSTOLIC BLOOD PRESSURE: 120 MMHG

## 2023-03-16 DIAGNOSIS — H66.002 NON-RECURRENT ACUTE SUPPURATIVE OTITIS MEDIA OF LEFT EAR WITHOUT SPONTANEOUS RUPTURE OF TYMPANIC MEMBRANE: Primary | ICD-10-CM

## 2023-03-16 PROCEDURE — 99213 OFFICE O/P EST LOW 20 MIN: CPT | Performed by: NURSE PRACTITIONER

## 2023-03-16 ASSESSMENT — PAIN SCALES - GENERAL: PAINLEVEL: NO PAIN (0)

## 2023-03-16 NOTE — PROGRESS NOTES
Assessment & Plan     Non-recurrent acute suppurative otitis media of left ear without spontaneous rupture of tympanic membrane  Mucopurulent effusion on exam. Start Augmentin. If not improving, she will let me know and will refer to ENT.  - amoxicillin-clavulanate (AUGMENTIN) 875-125 MG tablet; Take 1 tablet by mouth 2 times daily for 10 days    Patient Instructions   You have an ear infection.  Please begin taking the antibiotic, Augmentin, to treat this infection.  You will take this twice daily for 10 days.  Please complete all the medication.    Please use Tylenol 650mg every 4 hours or ibuprofen 600mg every 6 hours by mouth for pain/fever.  Do not exceed 4000mg of acetaminophen or 2400mg of ibuprofen from any source in a 24 hour period.  Taking Tylenol and ibuprofen together may be helpful in reducing pain.    If you are still having symptoms following completing the medication, please see your primary care provider for a recheck.  Return to clinic with new or worsening symptoms.    If hearing is not improved in 4-6 weeks, let me know and I will refer you to ENT.        Return in about 1 month (around 4/16/2023) for worsening or continued symptoms.    KATEY Zamora CNP  [facility] EMILY Rayo is a 57 year old, presenting for the following health issues:  Otalgia      History of Present Illness       Reason for visit:  Left ear plugged and recent blood in ear when cleaning with some pain  Symptom onset:  More than a month    She eats 2-3 servings of fruits and vegetables daily.She consumes 1 sweetened beverage(s) daily.She exercises with enough effort to increase her heart rate 30 to 60 minutes per day.  She exercises with enough effort to increase her heart rate 5 days per week.   She is taking medications regularly.     Above HPI reviewed. Additionally, has had hx of cerumen impactions so started flushing ears at home. Feels worse now. No otorrhea. No fevers.  Developed pain a few days ago.         Review of Systems   Constitutional, HEENT, cardiovascular, pulmonary, gi and gu systems are negative, except as otherwise noted.      Objective    /68   Pulse 86   Temp 97.7  F (36.5  C) (Tympanic)   Resp 16   Wt 52.6 kg (115 lb 14.4 oz)   SpO2 96%   BMI 20.53 kg/m    Body mass index is 20.53 kg/m .  Physical Exam  Vitals and nursing note reviewed.   Constitutional:       Appearance: Normal appearance.   HENT:      Head: Normocephalic and atraumatic.      Right Ear: Tympanic membrane and ear canal normal.      Left Ear: Ear canal normal. A middle ear effusion (mucopurulent) is present. Tympanic membrane is retracted.      Nose: Nose normal. No rhinorrhea.      Right Sinus: No maxillary sinus tenderness or frontal sinus tenderness.      Left Sinus: No maxillary sinus tenderness or frontal sinus tenderness.      Mouth/Throat:      Lips: Pink.      Mouth: Mucous membranes are moist.      Pharynx: Oropharynx is clear.   Eyes:      General: Lids are normal.      Conjunctiva/sclera: Conjunctivae normal.      Comments: Non icteric   Cardiovascular:      Rate and Rhythm: Normal rate and regular rhythm.      Pulses: Normal pulses.      Heart sounds: Normal heart sounds, S1 normal and S2 normal.   Pulmonary:      Effort: Pulmonary effort is normal.      Breath sounds: Normal breath sounds and air entry.   Musculoskeletal:      Cervical back: Neck supple.   Lymphadenopathy:      Cervical: No cervical adenopathy.   Skin:     General: Skin is warm and dry.   Neurological:      General: No focal deficit present.      Mental Status: She is alert and oriented to person, place, and time.   Psychiatric:         Mood and Affect: Mood normal.         Behavior: Behavior normal.         Thought Content: Thought content normal.         Judgment: Judgment normal.

## 2023-03-16 NOTE — PATIENT INSTRUCTIONS
You have an ear infection.  Please begin taking the antibiotic, Augmentin, to treat this infection.  You will take this twice daily for 10 days.  Please complete all the medication.    Please use Tylenol 650mg every 4 hours or ibuprofen 600mg every 6 hours by mouth for pain/fever.  Do not exceed 4000mg of acetaminophen or 2400mg of ibuprofen from any source in a 24 hour period.  Taking Tylenol and ibuprofen together may be helpful in reducing pain.    If you are still having symptoms following completing the medication, please see your primary care provider for a recheck.  Return to clinic with new or worsening symptoms.    If hearing is not improved in 4-6 weeks, let me know and I will refer you to ENT.

## 2023-03-23 ENCOUNTER — LAB (OUTPATIENT)
Dept: LAB | Facility: CLINIC | Age: 58
End: 2023-03-23
Payer: MEDICAID

## 2023-03-23 DIAGNOSIS — E03.9 HYPOTHYROIDISM, UNSPECIFIED TYPE: ICD-10-CM

## 2023-03-23 LAB — TSH SERPL DL<=0.005 MIU/L-ACNC: 1.39 UIU/ML (ref 0.3–4.2)

## 2023-03-23 PROCEDURE — 36415 COLL VENOUS BLD VENIPUNCTURE: CPT

## 2023-03-23 PROCEDURE — 84443 ASSAY THYROID STIM HORMONE: CPT

## 2023-03-24 ENCOUNTER — TELEPHONE (OUTPATIENT)
Dept: FAMILY MEDICINE | Facility: CLINIC | Age: 58
End: 2023-03-24
Payer: MEDICAID

## 2023-03-24 NOTE — TELEPHONE ENCOUNTER
Results given to patient with good understanding for   labs yesterday, 3/24/23. Kiana Luz on 3/24/2023 at 11:45 AM

## 2023-04-07 ENCOUNTER — OFFICE VISIT (OUTPATIENT)
Dept: FAMILY MEDICINE | Facility: CLINIC | Age: 58
End: 2023-04-07
Payer: MEDICAID

## 2023-04-07 VITALS
BODY MASS INDEX: 20.38 KG/M2 | SYSTOLIC BLOOD PRESSURE: 104 MMHG | RESPIRATION RATE: 16 BRPM | WEIGHT: 115 LBS | OXYGEN SATURATION: 99 % | HEART RATE: 88 BPM | HEIGHT: 63 IN | TEMPERATURE: 97.5 F | DIASTOLIC BLOOD PRESSURE: 68 MMHG

## 2023-04-07 DIAGNOSIS — H66.002 NON-RECURRENT ACUTE SUPPURATIVE OTITIS MEDIA OF LEFT EAR WITHOUT SPONTANEOUS RUPTURE OF TYMPANIC MEMBRANE: Primary | ICD-10-CM

## 2023-04-07 DIAGNOSIS — R51.9 FACIAL PAIN: ICD-10-CM

## 2023-04-07 PROCEDURE — 99214 OFFICE O/P EST MOD 30 MIN: CPT | Performed by: NURSE PRACTITIONER

## 2023-04-07 RX ORDER — CEFDINIR 300 MG/1
300 CAPSULE ORAL 2 TIMES DAILY
Qty: 20 CAPSULE | Refills: 0 | Status: SHIPPED | OUTPATIENT
Start: 2023-04-07 | End: 2023-04-17

## 2023-04-07 ASSESSMENT — PAIN SCALES - GENERAL: PAINLEVEL: MODERATE PAIN (5)

## 2023-04-07 NOTE — PROGRESS NOTES
"  Assessment & Plan     Non-recurrent acute suppurative otitis media of left ear without spontaneous rupture of tympanic membrane  Persistent left-sided AOM with worsening symptoms now radiating toward TMJ.  Failed treatment with Augmentin.  We will try a course of cefdinir.  Recommend she get a CT for further evaluation to r/o mastoiditis, other more malicious etiology.  - CT Temporal Bones wo Contrast; Future  - cefdinir (OMNICEF) 300 MG capsule; Take 1 capsule (300 mg) by mouth 2 times daily for 10 days    Facial pain  As above.  - CT Temporal Bones wo Contrast; Future  - cefdinir (OMNICEF) 300 MG capsule; Take 1 capsule (300 mg) by mouth 2 times daily for 10 days    Patient Instructions   Take cefdinir twice daily with food. Take a probiotic such as Culturelle or Florastor (recommend lactobacillus 50 billion CFU twice daily  by at least one hour from the antibiotic) while on the antibiotic or eat a Greek yogurt containing \"live active cultures\" daily.    To schedule CT, call [phone].        KATEY Zamora [facility]    Allie Rayo is a 57 year old, presenting for the following health issues:  Ear Problem (Left-sided, radiating to jaw)        4/7/2023     1:05 PM   Additional Questions   Roomed by Olesya BAILON   Accompanied by self     History of Present Illness       Reason for visit:  Ear pain going down to jaw    She eats 2-3 servings of fruits and vegetables daily.She consumes 1 sweetened beverage(s) daily.She exercises with enough effort to increase her heart rate 20 to 29 minutes per day.  She exercises with enough effort to increase her heart rate 4 days per week.   She is taking medications regularly.     Above HPI reviewed. Additionally, seen by me on March 16 for a left sided AOM.  Treated with 10 days of Augmentin.  Notes that she has never felt any better, has had persistent ear pain and decreased hearing.  No fevers.  Notes that over the past 2 " "days has felt significantly worse with pain radiating near the left TMJ.  No pain over mastoid.      Review of Systems   Constitutional, HEENT, cardiovascular, pulmonary, gi and gu systems are negative, except as otherwise noted.      Objective    /68   Pulse 88   Temp 97.5  F (36.4  C) (Tympanic)   Resp 16   Ht 1.6 m (5' 3\")   Wt 52.2 kg (115 lb)   SpO2 99%   BMI 20.37 kg/m    Body mass index is 20.37 kg/m .  Physical Exam  Vitals and nursing note reviewed.   Constitutional:       General: She is not in acute distress.     Appearance: Normal appearance.   HENT:      Head: Normocephalic and atraumatic.      Left Ear: A middle ear effusion (mucopurulent) is present. Tympanic membrane is erythematous and retracted.      Mouth/Throat:      Mouth: Mucous membranes are moist.   Cardiovascular:      Rate and Rhythm: Normal rate.   Pulmonary:      Effort: Pulmonary effort is normal.   Musculoskeletal:      Cervical back: Neck supple.   Skin:     General: Skin is warm and dry.   Neurological:      General: No focal deficit present.      Mental Status: She is alert.   Psychiatric:         Mood and Affect: Mood normal.         Behavior: Behavior normal.                    "

## 2023-04-07 NOTE — PATIENT INSTRUCTIONS
"Take cefdinir twice daily with food. Take a probiotic such as Culturelle or Florastor (recommend lactobacillus 50 billion CFU twice daily  by at least one hour from the antibiotic) while on the antibiotic or eat a Greek yogurt containing \"live active cultures\" daily.    To schedule CT, call [phone].    "

## 2023-04-10 ENCOUNTER — HOSPITAL ENCOUNTER (OUTPATIENT)
Dept: CT IMAGING | Facility: CLINIC | Age: 58
Discharge: HOME | End: 2023-04-10
Attending: NURSE PRACTITIONER | Admitting: NURSE PRACTITIONER
Payer: MEDICAID

## 2023-04-10 DIAGNOSIS — H66.002 NON-RECURRENT ACUTE SUPPURATIVE OTITIS MEDIA OF LEFT EAR WITHOUT SPONTANEOUS RUPTURE OF TYMPANIC MEMBRANE: ICD-10-CM

## 2023-04-10 DIAGNOSIS — R51.9 FACIAL PAIN: ICD-10-CM

## 2023-04-10 PROCEDURE — 70480 CT ORBIT/EAR/FOSSA W/O DYE: CPT

## 2023-04-11 ENCOUNTER — TELEPHONE (OUTPATIENT)
Dept: FAMILY MEDICINE | Facility: CLINIC | Age: 58
End: 2023-04-11
Payer: MEDICAID

## 2023-04-11 ENCOUNTER — TELEPHONE (OUTPATIENT)
Dept: OTOLARYNGOLOGY | Facility: CLINIC | Age: 58
End: 2023-04-11
Payer: MEDICAID

## 2023-04-11 DIAGNOSIS — H71.92 CHOLESTEATOMA, LEFT: Primary | ICD-10-CM

## 2023-04-11 LAB — RADIOLOGIST FLAGS: ABNORMAL

## 2023-04-11 NOTE — TELEPHONE ENCOUNTER
Spoke with patient. No change in symptoms. Emergent referral to ENT placed. Spoke with specialty RN in [place] who will route referral request to covering provider.

## 2023-04-11 NOTE — TELEPHONE ENCOUNTER
Karol from [facility] is calling to notify of urgent results of CT of temporal bones without contrast completed 4/10/23 at 3:04.     For questions please call Radiologist Kwadwo Gomez at [phone].    Ordered by Marcela Bowles. Will route high priority to provider and care team.    Thank you,  Alycia Lim RN

## 2023-04-11 NOTE — TELEPHONE ENCOUNTER
FUTURE VISIT INFORMATION:      FUTURE VISIT INFORMATION:    Date: 5/4/23    Time: 8 AM    Location: [facility]  REFERRAL INFORMATION:    Referring provider: Marcela Brown APRN CNP    Referring providers clinic: [facility]    Reason for visit/diagnosis:  r/s to first available appt [place] or [place] per clinic, patient confirmed appt time, date and [facility] location -[place] 04/11  Cholesteatoma, left [H71.92] ref by Marcela Brown APRN CNP recs in epic  RECORDS REQUESTED FROM:       Clinic name Comments Records Status Imaging Status   [facility] 4/7/23 note -Marcela Brown APRN CNP [facility] 4/10/23 Epic Pacs

## 2023-04-11 NOTE — TELEPHONE ENCOUNTER
[facility] Call Center    Phone Message    May a detailed message be left on voicemail: yes     Reason for Call: Appointment Intake    Referring Provider Name: Marcela Brown APRN CNP  Diagnosis and/or Symptoms: Patient with cholesteatoma on CT    Emergent referral please review, added to [facility] as a high priority  Action Taken: Message routed to:  [facility] ([facility] ENT    Travel Screening: Not Applicable

## 2023-05-02 NOTE — PROGRESS NOTES
Otolaryngology Clinic  05/04/2023       Consulting provider: Marcela Brown     Chief Complaint:  Consultation regarding left cholesteatoma     History of Present Illness:   Perri Scherer is a 57 year old female with left otorrhea  who presents for consultation regarding possible left cholesteatoma. The patient started to experience symptoms of ear pain, otorrhea, and hearing loss on the left side after she had performed irrigation for cerumen accumulation. She was started on Augmentin and cefdinir for left sided AOM without resolution of symptoms. Her pain now fluctuates day to day which ranges from throbbing to pressure sensation. She continues to have drainage. She had a CT temporal bones for further evaluation which demonstrated a left sided cholesteatoma for which she is referred. She denies history of recurrent ear infections even during childhood. She has not flown in several years but does not have a history of difficulty equilibrating on flights.     Active Medications:     Current Outpatient Medications:      butalbital-acetaminophen-caffeine (FIORICET/ESGIC) -40 MG per tablet, Take 1 tablet by mouth every 6 hours as needed (Patient not taking: Reported on 4/7/2023), Disp: 15 tablet, Rfl: 0     clobetasol (TEMOVATE) 0.05 % external cream, Apply topically daily as needed (psoriasis) Apply sparingly to affected area twice daily for 14 days.  Do not apply to face., Disp: 30 g, Rfl: 0     levothyroxine (SYNTHROID/LEVOTHROID) 75 MCG tablet, Take 1 tablet (75 mcg) by mouth daily, Disp: 90 tablet, Rfl: 3     lisinopril (ZESTRIL) 20 MG tablet, Take 1 tablet (20 mg) by mouth daily, Disp: 90 tablet, Rfl: 3     nystatin-triamcinolone (MYCOLOG II) cream, Apply topically 2 times daily (Patient not taking: Reported on 4/7/2023), Disp: 60 g, Rfl: 1     SUMAtriptan (IMITREX) 50 MG tablet, Take 1 tablet (50 mg) by mouth at onset of headache for migraine May repeat in 2 hours. Max 4 tablets/24 hours., Disp: 9  tablet, Rfl: 1     tacrolimus (PROTOPIC) 0.1 % ointment, Apply twice daily as needed., Disp: 30 g, Rfl: 1      Allergies:   Norvasc [amlodipine]      Past Medical History:  Past Medical History:   Diagnosis Date     HTN (hypertension), benign      Hypothyroidism      Psoriasis      Smoking      Patient Active Problem List   Diagnosis     Nicotine use disorder     Family history of diabetes mellitus     Hyperlipidemia LDL goal <160     Hypothyroidism     Benign essential hypertension     Hypothyroidism, unspecified type     Other migraine without status migrainosus, not intractable        Past Surgical History:  Past Surgical History:   Procedure Laterality Date     HC REPAIR OF HAMMERTOE,ONE      Description: Arthroplasty For Hammertoe;  Recorded: 2008;  Comments: B/L     TUBAL LIGATION       ZZC LIGATE FALLOPIAN TUBE      Description: Tubal Ligation;  Recorded: 2008;       Family History:   Family History   Problem Relation Age of Onset     Diabetes Mother      C.A.D. Father      Alzheimer Disease Maternal Grandfather      Substance Abuse Brother      Other - See Comments Sister         plane crash     Melanoma No family hx of          Social History:   Social History     Tobacco Use     Smoking status: Every Day     Packs/day: 0.50     Types: Other, Cigarettes     Last attempt to quit: 2017     Years since quittin.5     Smokeless tobacco: Never     Tobacco comments:     E-cig 3mg; 5-6 cigarettes a day    Vaping Use     Vaping status: Former     Substances: Nicotine     Devices: Refillable tank   Substance Use Topics     Alcohol use: Yes     Comment: weekends mostly.  10 beers per week     Drug use: No       Review of Systems:   Pertinent items are noted in HPI or as in patient entered ROS below, remainder of complete ROS is negative.       2023     7:19 AM    ENT ROS   Neurology Dizzy spells   Ears, Nose, Throat Hearing loss    Ear pain    Ringing/noise in ears       Physical  examination:  Constitutional:  In no acute distress, appears stated age  Eyes:  Extraocular movements intact, no spontaneous nystagmus  Ears:  Both ears examined under the microscope.  Right: small amount of cerumen removed from EAC but otherwise clear. Small retraction pocket at epitympanum. Left: small amount of wax removed from EAC. Large cholesteatoma coming from the posterior epitympanum with otorrhea that was suctioned and culture collected.   Respiratory:  No increased work of breathing, wheezing or stridor  Musculoskeletal:  Good upper extremity strength  Skin:  No rashes on the head and neck  Neurologic:  House Brackman 1/6 bilaterally, ambulating normally  Psychiatric:  Alert, normal affect, answering questions appropriately    Imaging: independently reviewed. Left opacification in the ear canal that is contiguous with opacification within the epitympanum that extends into the antrum with partial erosion of the short process of the incus, extremely eroded scutum, sclerotic mastoid that is opacified, long process of the incus and stapes appears intact and mesotympanum is aerated, otic capsule intact, tegmen intact, facial nerve in its usual position, Normal right temporal bone.  CT temporal 4/10/23  IMPRESSION:    1.  Left external auditory canal and lateral epitympanic mass with erosion of the scutum and erosion/remodeling along the lateral aspect of the incus and malleus, concerning for cholesteatoma. Complete opacification of the mastoid air cells with sclerotic change of the mastoid suggesting chronic inflammatory change. Recommend ENT consultation.  2.  Normal CT right temporal bone.    Assessment and Plan:  Perri Scherer is a 57 year old female with a history of acute suppurative otitis media of left ear after irrigation who presents for consultation regarding left cholesteatoma. She has a large cholesteatoma in the left ear with signs of infection and I collected a culture. I will start her on  Ciprodex drops and update pending culture results. I also advised her to start dry ear precautions. Once the infection is more optimally managed she will require surgery to address the cholesteatoma and I discussed two stage surgical approach with her and we will discuss further at next visit. We will plan to see her back in 1 month for recheck. We will cancel her audiogram from today and have her check this at next follow-up.     Scribe Disclosure:  I, Jerica Waggoner, am serving as a scribe to document services personally performed by Ruchi Rausch MD at this visit, based upon the provider's statements to me. All documentation has been reviewed by the aforementioned provider prior to being entered into the official medical record.     The documentation recorded by the scribe accurately reflects the services I personally performed and the decisions made by me.

## 2023-05-03 DIAGNOSIS — Z01.10 ENCOUNTER FOR HEARING TEST: Primary | ICD-10-CM

## 2023-05-04 ENCOUNTER — OFFICE VISIT (OUTPATIENT)
Dept: AUDIOLOGY | Facility: CLINIC | Age: 58
End: 2023-05-04
Payer: COMMERCIAL

## 2023-05-04 ENCOUNTER — OFFICE VISIT (OUTPATIENT)
Dept: OTOLARYNGOLOGY | Facility: CLINIC | Age: 58
End: 2023-05-04
Payer: COMMERCIAL

## 2023-05-04 ENCOUNTER — PRE VISIT (OUTPATIENT)
Dept: OTOLARYNGOLOGY | Facility: CLINIC | Age: 58
End: 2023-05-04

## 2023-05-04 VITALS — BODY MASS INDEX: 20.38 KG/M2 | HEIGHT: 63 IN | WEIGHT: 115 LBS

## 2023-05-04 DIAGNOSIS — H71.92 CHOLESTEATOMA, LEFT: Primary | ICD-10-CM

## 2023-05-04 DIAGNOSIS — H93.12 TINNITUS OF LEFT EAR: Primary | ICD-10-CM

## 2023-05-04 DIAGNOSIS — H92.12 OTORRHEA, LEFT: ICD-10-CM

## 2023-05-04 PROCEDURE — 87102 FUNGUS ISOLATION CULTURE: CPT | Performed by: OTOLARYNGOLOGY

## 2023-05-04 PROCEDURE — 99204 OFFICE O/P NEW MOD 45 MIN: CPT | Mod: 25 | Performed by: OTOLARYNGOLOGY

## 2023-05-04 PROCEDURE — 87077 CULTURE AEROBIC IDENTIFY: CPT | Performed by: OTOLARYNGOLOGY

## 2023-05-04 PROCEDURE — 92504 EAR MICROSCOPY EXAMINATION: CPT | Performed by: OTOLARYNGOLOGY

## 2023-05-04 PROCEDURE — 99207 PR ASSESSMENT FOR HEARING AID: CPT | Performed by: AUDIOLOGIST

## 2023-05-04 RX ORDER — CIPROFLOXACIN AND DEXAMETHASONE 3; 1 MG/ML; MG/ML
SUSPENSION/ DROPS AURICULAR (OTIC)
Qty: 7.5 ML | Refills: 0 | Status: SHIPPED | OUTPATIENT
Start: 2023-05-04 | End: 2023-05-14

## 2023-05-04 NOTE — PATIENT INSTRUCTIONS
You were seen in the ENT Clinic today by Dr. Rausch. If you have any questions or concerns after your appointment, please contact us (see below)      2.   Please return to clinic in one month.  3.   Medication has been sent to you pharmacy, please use until your next visit.   4.   We will plan to call you with the results of the culture.        How to Contact Us:  Send a Code42 message to your provider. Our team will respond to you via Code42. Occasionally, we will need to call you to get further information.  For urgent matters (Monday-Friday), call the ENT Clinic: [phone] and speak with a call center team member - they will route your call appropriately.   If you'd like to speak directly with a nurse, please find our contact information below. We do our best to check voicemail frequently throughout the day, and will work to call you back within 1-2 days. For urgent matters, please use the general clinic phone numbers listed above.      Hina ADAMS RN  ENT RN Care Coordinator  Direct: [phone]    Jacqueline MONTAGUE LPN  Direct: [phone]

## 2023-05-04 NOTE — PROGRESS NOTES
AUDIOLOGY REPORT     SUMMARY: History of left otitis media. Patient repots treatment with 2 rounds of antibitotics. Patient reports decrease in hearing in the left ear, constant left tinnitus, significant aural fullness, left drainage and pain at times. Reports dizzy spells. Denies concerns for right ear or noise exposure. Otoscopy revealed significant drainage in left ear. Patient was seen by ENT first for assessment. Audiogram was cancelled by ENT at this time and was not completed today.        Chhaya Morris. CCC-A  Licensed Audiologist   [place] #[id number]

## 2023-05-04 NOTE — LETTER
5/4/2023       RE: Perri Scherer  [address] [facility] [address]     Dear Colleague,    Thank you for referring your patient, Perri Scherer, to the [facility] EAR NOSE AND THROAT CLINIC [place] at [facility]. Please see a copy of my visit note below.      Otolaryngology Clinic  05/04/2023       Consulting provider: Marcela Brown     Chief Complaint:  Consultation regarding left cholesteatoma     History of Present Illness:   Perri Scherer is a 57 year old female with left otorrhea  who presents for consultation regarding possible left cholesteatoma. The patient started to experience symptoms of ear pain, otorrhea, and hearing loss on the left side after she had performed irrigation for cerumen accumulation. She was started on Augmentin and cefdinir for left sided AOM without resolution of symptoms. Her pain now fluctuates day to day which ranges from throbbing to pressure sensation. She continues to have drainage. She had a CT temporal bones for further evaluation which demonstrated a left sided cholesteatoma for which she is referred. She denies history of recurrent ear infections even during childhood. She has not flown in several years but does not have a history of difficulty equilibrating on flights.     Active Medications:     Current Outpatient Medications:     butalbital-acetaminophen-caffeine (FIORICET/ESGIC) -40 MG per tablet, Take 1 tablet by mouth every 6 hours as needed (Patient not taking: Reported on 4/7/2023), Disp: 15 tablet, Rfl: 0    clobetasol (TEMOVATE) 0.05 % external cream, Apply topically daily as needed (psoriasis) Apply sparingly to affected area twice daily for 14 days.  Do not apply to face., Disp: 30 g, Rfl: 0    levothyroxine (SYNTHROID/LEVOTHROID) 75 MCG tablet, Take 1 tablet (75 mcg) by mouth daily, Disp: 90 tablet, Rfl: 3    lisinopril (ZESTRIL) 20 MG tablet, Take 1 tablet (20 mg) by mouth daily, Disp:  90 tablet, Rfl: 3    nystatin-triamcinolone (MYCOLOG II) cream, Apply topically 2 times daily (Patient not taking: Reported on 2023), Disp: 60 g, Rfl: 1    SUMAtriptan (IMITREX) 50 MG tablet, Take 1 tablet (50 mg) by mouth at onset of headache for migraine May repeat in 2 hours. Max 4 tablets/24 hours., Disp: 9 tablet, Rfl: 1    tacrolimus (PROTOPIC) 0.1 % ointment, Apply twice daily as needed., Disp: 30 g, Rfl: 1      Allergies:   Norvasc [amlodipine]      Past Medical History:  Past Medical History:   Diagnosis Date    HTN (hypertension), benign     Hypothyroidism     Psoriasis     Smoking      Patient Active Problem List   Diagnosis    Nicotine use disorder    Family history of diabetes mellitus    Hyperlipidemia LDL goal <160    Hypothyroidism    Benign essential hypertension    Hypothyroidism, unspecified type    Other migraine without status migrainosus, not intractable        Past Surgical History:  Past Surgical History:   Procedure Laterality Date    HC REPAIR OF HAMMERTOE,ONE      Description: Arthroplasty For Hammertoe;  Recorded: 2008;  Comments: B/L    TUBAL LIGATION      [facility] LIGATE FALLOPIAN TUBE      Description: Tubal Ligation;  Recorded: 2008;       Family History:   Family History   Problem Relation Age of Onset    Diabetes Mother     C.A.D. Father     Alzheimer Disease Maternal Grandfather     Substance Abuse Brother     Other - See Comments Sister         plane crash    Melanoma No family hx of          Social History:   Social History     Tobacco Use    Smoking status: Every Day     Packs/day: 0.50     Types: Other, Cigarettes     Last attempt to quit: 2017     Years since quittin.5    Smokeless tobacco: Never    Tobacco comments:     E-cig 3mg; 5-6 cigarettes a day    Vaping Use    Vaping status: Former     Substances: Nicotine     Devices: Refillable tank   Substance Use Topics    Alcohol use: Yes     Comment: weekends mostly.  10 beers per week    Drug use: No        Review of Systems:   Pertinent items are noted in HPI or as in patient entered ROS below, remainder of complete ROS is negative.       5/4/2023     7:19 AM    ENT ROS   Neurology Dizzy spells   Ears, Nose, Throat Hearing loss    Ear pain    Ringing/noise in ears       Physical examination:  Constitutional:  In no acute distress, appears stated age  Eyes:  Extraocular movements intact, no spontaneous nystagmus  Ears:  Both ears examined under the microscope.  Right: small amount of cerumen removed from EAC but otherwise clear. Small retraction pocket at epitympanum. Left: small amount of wax removed from EAC. Large cholesteatoma coming from the posterior epitympanum with otorrhea that was suctioned and culture collected.   Respiratory:  No increased work of breathing, wheezing or stridor  Musculoskeletal:  Good upper extremity strength  Skin:  No rashes on the head and neck  Neurologic:  House Brackman 1/6 bilaterally, ambulating normally  Psychiatric:  Alert, normal affect, answering questions appropriately    Imaging: independently reviewed. Left opacification in the ear canal that is contiguous with opacification within the epitympanum that extends into the antrum with partial erosion of the short process of the incus, extremely eroded scutum, sclerotic mastoid that is opacified, long process of the incus and stapes appears intact and mesotympanum is aerated, otic capsule intact, tegmen intact, facial nerve in its usual position, Normal right temporal bone.  CT temporal 4/10/23  IMPRESSION:    1.  Left external auditory canal and lateral epitympanic mass with erosion of the scutum and erosion/remodeling along the lateral aspect of the incus and malleus, concerning for cholesteatoma. Complete opacification of the mastoid air cells with sclerotic change of the mastoid suggesting chronic inflammatory change. Recommend ENT consultation.  2.  Normal CT right temporal bone.    Assessment and Plan:  Perri Scherer  is a 57 year old female with a history of acute suppurative otitis media of left ear after irrigation who presents for consultation regarding left cholesteatoma. She has a large cholesteatoma in the left ear with signs of infection and I collected a culture. I will start her on Ciprodex drops and update pending culture results. I also advised her to start dry ear precautions. Once the infection is more optimally managed she will require surgery to address the cholesteatoma and I discussed two stage surgical approach with her and we will discuss further at next visit. We will plan to see her back in 1 month for recheck. We will cancel her audiogram from today and have her check this at next follow-up.     Scribe Disclosure:  I, Jerica Waggoner, am serving as a scribe to document services personally performed by Ruchi Rausch MD at this visit, based upon the provider's statements to me. All documentation has been reviewed by the aforementioned provider prior to being entered into the official medical record.     The documentation recorded by the scribe accurately reflects the services I personally performed and the decisions made by me.        Again, thank you for allowing me to participate in the care of your patient.      Sincerely,    Ruchi Rausch MD

## 2023-05-09 ENCOUNTER — TELEPHONE (OUTPATIENT)
Dept: OTOLARYNGOLOGY | Facility: CLINIC | Age: 58
End: 2023-05-09
Payer: COMMERCIAL

## 2023-05-09 DIAGNOSIS — H71.92 CHOLESTEATOMA, LEFT: Primary | ICD-10-CM

## 2023-05-09 LAB
BACTERIA SPEC CULT: ABNORMAL

## 2023-05-09 RX ORDER — SULFACETAMIDE SODIUM AND PREDNISOLONE SODIUM PHOSPHATE 100; 2.3 MG/ML; MG/ML
5 SOLUTION/ DROPS OPHTHALMIC 2 TIMES DAILY
Qty: 5 ML | Refills: 1 | Status: SHIPPED | OUTPATIENT
Start: 2023-05-09 | End: 2023-05-30

## 2023-05-09 RX ORDER — SULFAMETHOXAZOLE/TRIMETHOPRIM 800-160 MG
1 TABLET ORAL 2 TIMES DAILY
Qty: 42 TABLET | Refills: 0 | Status: SHIPPED | OUTPATIENT
Start: 2023-05-09 | End: 2023-05-30

## 2023-05-22 ENCOUNTER — TELEPHONE (OUTPATIENT)
Dept: OTOLARYNGOLOGY | Facility: CLINIC | Age: 58
End: 2023-05-22
Payer: COMMERCIAL

## 2023-05-22 DIAGNOSIS — H71.92 CHOLESTEATOMA, LEFT: ICD-10-CM

## 2023-05-22 NOTE — TELEPHONE ENCOUNTER
[facility] Call Center    Phone Message    May a detailed message be left on voicemail: yes     Reason for Call: Other: Pt calling in regards to the the ear drops , states that she is all out and wants to know if she needs to keep taking them . If so she will need another refill sent to her pharmacy . Please reach out to pt to discuss. thank you      Action Taken: Message routed to:  [facility] ([facility] ENT     Travel Screening: Not Applicable

## 2023-05-23 RX ORDER — CIPROFLOXACIN AND DEXAMETHASONE 3; 1 MG/ML; MG/ML
SUSPENSION/ DROPS AURICULAR (OTIC)
Qty: 7.5 ML | Refills: 0 | OUTPATIENT
Start: 2023-05-23

## 2023-05-23 NOTE — TELEPHONE ENCOUNTER
CIPRODEX 0.3%-0.1% OTIC SUSP   Last Written Prescription Date:  5/4/23> 5/14/23  Last Fill Quantity: 7.5 ml ,   # refills: 0  Last Office Visit : 5/4/23  Future Office visit:  6/1/23        Changed to  Sulfacetamide- prednisolone gtts + oral Bactrim on 5/9/23 Dr Rausch

## 2023-06-01 ENCOUNTER — OFFICE VISIT (OUTPATIENT)
Dept: OTOLARYNGOLOGY | Facility: CLINIC | Age: 58
End: 2023-06-01
Payer: COMMERCIAL

## 2023-06-01 ENCOUNTER — OFFICE VISIT (OUTPATIENT)
Dept: AUDIOLOGY | Facility: CLINIC | Age: 58
End: 2023-06-01
Payer: COMMERCIAL

## 2023-06-01 DIAGNOSIS — H90.A32 MIXED CONDUCTIVE AND SENSORINEURAL HEARING LOSS OF LEFT EAR WITH RESTRICTED HEARING OF RIGHT EAR: Primary | ICD-10-CM

## 2023-06-01 DIAGNOSIS — H71.92 CHOLESTEATOMA, LEFT: Primary | ICD-10-CM

## 2023-06-01 LAB — BACTERIA SPEC CULT: NO GROWTH

## 2023-06-01 PROCEDURE — 92565 STENGER TEST PURE TONE: CPT | Performed by: AUDIOLOGIST

## 2023-06-01 PROCEDURE — 99213 OFFICE O/P EST LOW 20 MIN: CPT | Mod: 25 | Performed by: OTOLARYNGOLOGY

## 2023-06-01 PROCEDURE — 92550 TYMPANOMETRY & REFLEX THRESH: CPT | Performed by: AUDIOLOGIST

## 2023-06-01 PROCEDURE — 92504 EAR MICROSCOPY EXAMINATION: CPT | Performed by: OTOLARYNGOLOGY

## 2023-06-01 PROCEDURE — 92557 COMPREHENSIVE HEARING TEST: CPT | Performed by: AUDIOLOGIST

## 2023-06-01 RX ORDER — ACETAMINOPHEN 325 MG/1
975 TABLET ORAL ONCE
Status: CANCELLED | OUTPATIENT
Start: 2023-06-01 | End: 2023-06-01

## 2023-06-01 RX ORDER — CEFAZOLIN SODIUM 2 G/50ML
2 SOLUTION INTRAVENOUS
Status: CANCELLED | OUTPATIENT
Start: 2023-06-01

## 2023-06-01 RX ORDER — CEFAZOLIN SODIUM 2 G/50ML
2 SOLUTION INTRAVENOUS SEE ADMIN INSTRUCTIONS
Status: CANCELLED | OUTPATIENT
Start: 2023-06-01

## 2023-06-01 RX ORDER — DEXAMETHASONE SODIUM PHOSPHATE 4 MG/ML
10 INJECTION, SOLUTION INTRA-ARTICULAR; INTRALESIONAL; INTRAMUSCULAR; INTRAVENOUS; SOFT TISSUE ONCE
Status: CANCELLED | OUTPATIENT
Start: 2023-06-01 | End: 2023-06-01

## 2023-06-01 NOTE — PROGRESS NOTES
Perri Scherer is seen for a left ear check. She says her ear feels much better after the drops and she has one day left of oral antibiotics. The ear is dry with no pain and her hearing has improved quite a bit. I did remove a good amount of cholesteatoma out of the epitympanum at the last visit. She grew pseudomonas oleovorans and staphylococcus lugdunensis.    Physical examination:  female in no acute distress.  Alert and answering questions appropriately.  HB 1/6 bilaterally.  Left ear examined under the microscope. TM with a deep posterior epitympanic retraction pocket with cholesteatoma coming out of the antrum, the edge of the posterior ear canal remains slightly inflamed but much improved from prior, remainder of the middle ear appears aerated.    Assessment and plan:  Improved exam after removal of a portion of the cholesteatoma and oral and topical antibiotics. We again discussed the recommendation for a left cartilage tympanomastoidectomy. She asked again about what would happen if she did nothing and we went over the potential risks of meningitis, total loss of hearing and balance which would be irreversible and facial nerve injury. Her  was pretty adamant that she needed it done and she is in agreement. We also discussed the need for revision surgery in 6 months with t-tube placement for her underlying eustachian tube dysfunction. She understands that some of her ossicular chain may need to be removed and her hearing may be worse after the first surgery. The risks and benefits were discussed. The risks include but are not limited to:  Worsened hearing which may require further surgery, profound and irreversible hearing loss, dizziness, damage to the taste nerve, damage to the facial nerve, tympanic membrane perforation requiring further surgery and infection. Postoperative restrictions were discussed. She will finish her oral antibiotic and can stop the drops but will hold onto the drops in case  her ear starts draining or hurting again.

## 2023-06-01 NOTE — PATIENT INSTRUCTIONS
You were seen in the ENT Clinic today by Dr. Rausch. If you have any questions or concerns after your appointment, please contact us (see below)      2.   Our surgery schedulers will reach out to your regarding a surgery date. Once you confirm a surgery date, then you should schedule your pre-op appointment within 30 days of surgery. Schedulers will also mail you a surgery packet with further details about the surgery process, and will also include scrub soap. There will be instructions in the packet of how to use the scrub soap on the night before and morning of surgery.    Surgery Teaching      1.You must have a physical exam (called  history and physical ) within 30 days of surgery. You can do this at the PAC clinic or your family clinic. Bring the Pre-Op Surgery Form (found in the surgery packet that you received in the mail) with you to your primary doctor if you chose to have them complete this. If your doctor is in the [facility], they do not need this form.     2. Ask your doctor what medicines are safe before surgery.          * If you are on any blood-thinners, we will need to make a plan with your INR clinic or prescribing doctor of when you need to stop these medications before surgery    3. NO MOTRIN, IBUPROFEN, ASPIRIN, ALEVE, GARLIC SUPPLEMENTS or FISH OIL x 7 days prior to surgery ( to prevent excess bleeding and bruising at time of surgery).    4. For same-day surgery, you must arrange for an adult to take you home from the Center. An adult must stay with you for the first 24 hours after surgery. You cannot drive for 24 hours, or longer if you are still taking narcotic medications.      5. Stop drinking alcohol at least 24 hours before surgery.      6. Stop or at least cut down on smoking 24 hours before surgery.     7. Take a bath or shower the night before and the morning of surgery (refer to surgery packet for extra information). You should use the soap that we mailed to you or gave in  clinic (2 small bottles). Use the entire bottle of soap for each shower, washing from the neck down, then rinsing off. Sleep in clean clothing and use clean bedding sheets. If your doctor does not give you special soap, buy Hibiclens or Dawna-Stat at the drug store or ask the pharmacist to suggest a brand. Do not put on lotion, powder, perfume, deodorant or make-up after bathing.     8. You can eat a normal meal the night before surgery. Do not eat any solid foods or drink any milk products for 8 hours before surgery. A pre-op nurse will call you the week before surgery to confirm your eating cut-off time, but please listen to this 8-hour rule if you miss their call.     9. You may drink clear liquids until 2 hours before surgery. Clear liquids include water, Gatorade, apple juice, or other liquids you can read through.    10.  If you need FMLA paperwork filled out for your surgery, please send this to us before surgery if you are able (in-person, fax, or mail). DO NOT give this to the pre-op nurses on the day of surgery or it will get lost.    11. Generally, we recommend 1 week off of work for healing after ear surgery. Most patients are feeling very well by day 2-3 after surgery. If you have a labor-intensive job with heavy lifting, you may need a work-modification and we are able to give you a work letter for this so that you can continue to work.    12. Generally, no flying for 4-6 weeks after ear surgery.     13. Care after surgery/Dry ear precautions:  In most cases, dissolvable sutures are used inside the ear. In some cases, gauze packing/gel foam is used. Do not loosen or remove any packing inside the ear canal. Your doctor will take out any removable packing at your first post-op clinic appointment. Your ear may feel full, and noises may sound dull after surgery, this is normal and likely related to the packing.    You may have some drainage from your ear after surgery, this is normal. If your internal  packing falls out on its own, you may have more drainage. For excess drainage after surgery, you can place a cotton ball in your out ear canal/pocket of your ear, and change this daily. If you are not having much drainage, it is OK to not wear a cotton ball after surgery. Do not stick anything into your ear canal.    They will give you a printed packet on the day of your surgery regarding more in-depth incision and ear care, and also clarify when you can take your ear dressing off. Generally, you will go home with an outer ear dressing along with a cotton ball underneath this dressing. You should leave the white steri strips (bandaids) in place behind your ear if you have an incision - the steri strips will fall off on their own in 7-12 days- you may trim the edges as they loosen, but do not peel it off. It is OK to get these wet after you are given the OK to shower, but make sure to pat them dry, no scrubbing.    Please refer to your day-of surgery packet for when you can shower after surgery. Generally, this is at least 48 hours. You should protect the ear prior to showering by gently placing a cotton ball into the ear canal, cover the external portion with Vaseline to form a seal and repel water. Removed cotton ball afterward. Do this until the ear canal is healed. Do not allow water, soap or shampoo to get into the ear canal - this can lead to infection.      How to Contact Us:  Send a [employer] message to your provider. Our team will respond to you via [employer]. Occasionally, we will need to call you to get further information.  For urgent matters (Monday-Friday), call the ENT Clinic: [phone] and speak with a call center team member - they will route your call appropriately.   If you'd like to speak directly with a nurse, please find our contact information below. We do our best to check voicemail frequently throughout the day, and will work to call you back within 1-2 days. For urgent matters, please use the  general clinic phone numbers listed above.      Hina ADAMS RN  ENT RN Care Coordinator  Direct: [phone]    Jacqueline MONTAGUE LPN  Direct: [phone]

## 2023-06-01 NOTE — PROGRESS NOTES
AUDIOLOGY REPORT    SUMMARY: Audiology visit completed. See audiogram for results.      RECOMMENDATIONS: Follow-up with ENT.      Chhaya Kauffman.  Licensed Audiologist  [place] #[id number]

## 2023-06-01 NOTE — LETTER
6/1/2023       RE: Perri Scherer  [address]  [facility] [address]     Dear Colleague,    Thank you for referring your patient, Perri Scherer, to the [facility] EAR NOSE AND THROAT CLINIC [place] at [facility]. Please see a copy of my visit note below.    Perri Scherer is seen for a left ear check. She says her ear feels much better after the drops and she has one day left of oral antibiotics. The ear is dry with no pain and her hearing has improved quite a bit. I did remove a good amount of cholesteatoma out of the epitympanum at the last visit. She grew pseudomonas oleovorans and staphylococcus lugdunensis.    Physical examination:  female in no acute distress.  Alert and answering questions appropriately.  HB 1/6 bilaterally.  Left ear examined under the microscope. TM with a deep posterior epitympanic retraction pocket with cholesteatoma coming out of the antrum, the edge of the posterior ear canal remains slightly inflamed but much improved from prior, remainder of the middle ear appears aerated.    Assessment and plan:  Improved exam after removal of a portion of the cholesteatoma and oral and topical antibiotics. We again discussed the recommendation for a left cartilage tympanomastoidectomy. She asked again about what would happen if she did nothing and we went over the potential risks of meningitis, total loss of hearing and balance which would be irreversible and facial nerve injury. Her  was pretty adamant that she needed it done and she is in agreement. We also discussed the need for revision surgery in 6 months with t-tube placement for her underlying eustachian tube dysfunction. She understands that some of her ossicular chain may need to be removed and her hearing may be worse after the first surgery. The risks and benefits were discussed. The risks include but are not limited to:  Worsened hearing which may require further  surgery, profound and irreversible hearing loss, dizziness, damage to the taste nerve, damage to the facial nerve, tympanic membrane perforation requiring further surgery and infection. Postoperative restrictions were discussed. She will finish her oral antibiotic and can stop the drops but will hold onto the drops in case her ear starts draining or hurting again.        Again, thank you for allowing me to participate in the care of your patient.      Sincerely,    Ruchi Rausch MD

## 2023-06-02 ENCOUNTER — TELEPHONE (OUTPATIENT)
Dept: OTOLARYNGOLOGY | Facility: CLINIC | Age: 58
End: 2023-06-02
Payer: COMMERCIAL

## 2023-06-02 PROBLEM — H71.92 CHOLESTEATOMA, LEFT: Status: ACTIVE | Noted: 2023-06-02

## 2023-06-05 ENCOUNTER — OFFICE VISIT (OUTPATIENT)
Dept: FAMILY MEDICINE | Facility: CLINIC | Age: 58
End: 2023-06-05
Payer: COMMERCIAL

## 2023-06-05 VITALS
DIASTOLIC BLOOD PRESSURE: 72 MMHG | RESPIRATION RATE: 16 BRPM | SYSTOLIC BLOOD PRESSURE: 102 MMHG | WEIGHT: 110 LBS | HEIGHT: 63 IN | HEART RATE: 62 BPM | BODY MASS INDEX: 19.49 KG/M2 | TEMPERATURE: 97.9 F | OXYGEN SATURATION: 98 %

## 2023-06-05 DIAGNOSIS — Z01.818 PREOP GENERAL PHYSICAL EXAM: Primary | ICD-10-CM

## 2023-06-05 DIAGNOSIS — I10 BENIGN ESSENTIAL HYPERTENSION: ICD-10-CM

## 2023-06-05 DIAGNOSIS — E03.9 HYPOTHYROIDISM, UNSPECIFIED TYPE: ICD-10-CM

## 2023-06-05 DIAGNOSIS — H71.92 CHOLESTEATOMA, LEFT: ICD-10-CM

## 2023-06-05 PROCEDURE — 99214 OFFICE O/P EST MOD 30 MIN: CPT | Performed by: NURSE PRACTITIONER

## 2023-06-05 ASSESSMENT — PAIN SCALES - GENERAL: PAINLEVEL: NO PAIN (0)

## 2023-06-05 NOTE — H&P (VIEW-ONLY)
[facility]  [address] [address]  Phone: [phone]  Primary Provider: Daniel Duran  Pre-op Performing Provider: DEJUAN PACHECO      PREOPERATIVE EVALUATION:  Today's date: 6/5/2023    Perri Scherer is a 57 year old female who presents for a preoperative evaluation.      6/5/2023     6:41 AM   Additional Questions   Roomed by Olesya BAILON   Accompanied by self     Surgical Information:  Surgery/Procedure: LEFT TYMPANOMASTOIDECTOMY, WITH FACIAL NERVE MONITORING, POSSIBLE CARTILAGE BACKING  [place] [facility] [place]: Dr. Ruchi Rausch  Surgery Date: 6/14/23  Time of Surgery: 7:15am  Where patient plans to recover: At home with family  Fax number for surgical facility: Note does not need to be faxed, will be available electronically in Epic.    Assessment & Plan     The proposed surgical procedure is considered INTERMEDIATE risk.    Preop general physical exam      Cholesteatoma, left      Benign essential hypertension  Controlled.    Hypothyroidism, unspecified type  Stable.      - No identified additional risk factors other than previously addressed    Antiplatelet or Anticoagulation Medication Instructions:   - Patient is on no antiplatelet or anticoagulation medications.    Additional Medication Instructions:  Patient is to take all scheduled medications on the day of surgery    RECOMMENDATION:  APPROVAL GIVEN to proceed with proposed procedure, without further diagnostic evaluation.        Subjective       HPI related to upcoming procedure: Left cholesteatoma, planned procedure to address.          6/5/2023     6:43 AM   Preop Questions   1. Have you ever had a heart attack or stroke? No   2. Have you ever had surgery on your heart or blood vessels, such as a stent placement, a coronary artery bypass, or surgery on an artery in your head, neck, heart, or legs? No   3. Do you have chest pain with activity? No   4. Do you have a history of  heart failure? No    5. Do you currently have a cold, bronchitis or symptoms of other infection? No   6. Do you have a cough, shortness of breath, or wheezing? No   7. Do you or anyone in your family have previous history of blood clots? UNKNOWN -    8. Do you or does anyone in your family have a serious bleeding problem such as prolonged bleeding following surgeries or cuts? No   9. Have you ever had problems with anemia or been told to take iron pills? No   10. Have you had any abnormal blood loss such as black, tarry or bloody stools, or abnormal vaginal bleeding? No   11. Have you ever had a blood transfusion? No   12. Are you willing to have a blood transfusion if it is medically needed before, during, or after your surgery? Yes   13. Have you or any of your relatives ever had problems with anesthesia? No   14. Do you have sleep apnea, excessive snoring or daytime drowsiness? No   15. Do you have any artifical heart valves or other implanted medical devices like a pacemaker, defibrillator, or continuous glucose monitor? No   16. Do you have artificial joints? No   17. Are you allergic to latex? No   18. Is there any chance that you may be pregnant? No     Health Care Directive:  Patient does not have a Health Care Directive or Living Will:     Preoperative Review of :   reviewed - no record of controlled substances prescribed.      Status of Chronic Conditions:  HYPERTENSION - Patient has longstanding history of HTN , currently denies any symptoms referable to elevated blood pressure. Specifically denies chest pain, palpitations, dyspnea, orthopnea, PND or peripheral edema. Blood pressure readings have been in normal range. Current medication regimen is as listed below. Patient denies any side effects of medication.     HYPOTHYROIDISM - Patient has a longstanding history of chronic Hypothyroidism. Patient has been doing well, noting no tremor, insomnia, hair loss or changes in skin texture. Continues to take medications as  directed, without adverse reactions or side effects. Last TSH   Lab Results   Component Value Date    TSH 1.39 03/23/2023   .        Review of Systems  CONSTITUTIONAL: NEGATIVE for fever, chills, change in weight  INTEGUMENTARY/SKIN: NEGATIVE for worrisome rashes, moles or lesions  EYES: NEGATIVE for vision changes or irritation  ENT/MOUTH: NEGATIVE for ear, mouth and throat problems  RESP: NEGATIVE for significant cough or SOB  CV: NEGATIVE for chest pain, palpitations or peripheral edema  GI: NEGATIVE for nausea, abdominal pain, heartburn, or change in bowel habits  : NEGATIVE for frequency, dysuria, or hematuria  MUSCULOSKELETAL: NEGATIVE for significant arthralgias or myalgia  NEURO: NEGATIVE for weakness, dizziness or paresthesias  ENDOCRINE: NEGATIVE for temperature intolerance, skin/hair changes  HEME: NEGATIVE for bleeding problems  PSYCHIATRIC: NEGATIVE for changes in mood or affect    Patient Active Problem List    Diagnosis Date Noted     Cholesteatoma, left 06/02/2023     Priority: Medium     Added automatically from request for surgery [id number]       Hypothyroidism, unspecified type 05/01/2018     Priority: Medium     Other migraine without status migrainosus, not intractable 05/01/2018     Priority: Medium     Benign essential hypertension 07/17/2017     Priority: Medium     Hypothyroidism 04/25/2016     Priority: Medium     Hyperlipidemia LDL goal <160 01/22/2015     Priority: Medium     Nicotine use disorder 01/19/2015     Priority: Medium     E-cigs       Family history of diabetes mellitus 01/19/2015     Priority: Medium      Past Medical History:   Diagnosis Date     HTN (hypertension), benign      Hypothyroidism      Psoriasis      Smoking      Past Surgical History:   Procedure Laterality Date     HC REPAIR OF HAMMERTOE,ONE      Description: Arthroplasty For Hammertoe;  Recorded: 05/01/2008;  Comments: B/L     TUBAL LIGATION  1992     [facility] LIGATE FALLOPIAN TUBE      Description: Tubal Ligation;   "Recorded: 2008;     Current Outpatient Medications   Medication Sig Dispense Refill     butalbital-acetaminophen-caffeine (FIORICET/ESGIC) -40 MG per tablet Take 1 tablet by mouth every 6 hours as needed 15 tablet 0     clobetasol (TEMOVATE) 0.05 % external cream Apply topically daily as needed (psoriasis) Apply sparingly to affected area twice daily for 14 days.  Do not apply to face. 30 g 0     levothyroxine (SYNTHROID/LEVOTHROID) 75 MCG tablet Take 1 tablet (75 mcg) by mouth daily 90 tablet 3     lisinopril (ZESTRIL) 20 MG tablet Take 1 tablet (20 mg) by mouth daily 90 tablet 3     nystatin-triamcinolone (MYCOLOG II) cream Apply topically 2 times daily 60 g 1     SUMAtriptan (IMITREX) 50 MG tablet Take 1 tablet (50 mg) by mouth at onset of headache for migraine May repeat in 2 hours. Max 4 tablets/24 hours. 9 tablet 1     tacrolimus (PROTOPIC) 0.1 % ointment Apply twice daily as needed. 30 g 1       Allergies   Allergen Reactions     Norvasc [Amlodipine] Other (See Comments)     Dizziness          Social History     Tobacco Use     Smoking status: Every Day     Packs/day: 0.50     Types: Other, Cigarettes     Last attempt to quit: 2017     Years since quittin.5     Smokeless tobacco: Never     Tobacco comments:     E-cig 3mg; 5-6 cigarettes a day    Vaping Use     Vaping status: Former     Substances: Nicotine     Devices: [employer] tank   Substance Use Topics     Alcohol use: Yes     Comment: weekends mostly.  10 beers per week     Family History   Problem Relation Age of Onset     Diabetes Mother      C.A.D. Father      Alzheimer Disease Maternal Grandfather      Substance Abuse Brother      Other - See Comments Sister         plane crash     Melanoma No family hx of      History   Drug Use No         Objective     /72   Pulse 62   Temp 97.9  F (36.6  C) (Tympanic)   Resp 16   Ht 1.6 m (5' 3\")   Wt 49.9 kg (110 lb)   SpO2 98%   BMI 19.49 kg/m      Physical Exam    GENERAL " APPEARANCE: healthy, alert and no distress     EYES: EOMI, PERRL     HENT: View of left TM obstructed by purulent material, right TM normal, nose and mouth without ulcers or lesions     NECK: no adenopathy, no asymmetry, masses, or scars and thyroid normal to palpation     RESP: lungs clear to auscultation - no rales, rhonchi or wheezes     CV: regular rates and rhythm, normal S1 S2, no S3 or S4 and no murmur, click or rub     ABDOMEN:  soft, nontender, no HSM or masses and bowel sounds normal     MS: extremities normal- no gross deformities noted, no evidence of inflammation in joints, FROM in all extremities.     SKIN: no suspicious lesions or rashes     NEURO: Normal strength and tone, sensory exam grossly normal, mentation intact and speech normal     PSYCH: mentation appears normal. and affect normal/bright     LYMPHATICS: No cervical adenopathy    Recent Labs   Lab Test 11/29/22  1415 08/16/21  0732    139   POTASSIUM 4.3 4.4   CR 0.60 0.72        Diagnostics:  No labs were ordered during this visit.   No EKG required, no history of coronary heart disease, significant arrhythmia, peripheral arterial disease or other structural heart disease.    Revised Cardiac Risk Index (RCRI):  The patient has the following serious cardiovascular risks for perioperative complications:   - No serious cardiac risks = 0 points     RCRI Interpretation: 0 points: Class I (very low risk - 0.4% complication rate)           Signed Electronically by: KATEY Zamora CNP  Copy of this evaluation report is provided to requesting physician.

## 2023-06-05 NOTE — PROGRESS NOTES
[facility]  [address] [address]  Phone: [phone]  Primary Provider: Daniel Duran  Pre-op Performing Provider: DEJUAN PACHECO      PREOPERATIVE EVALUATION:  Today's date: 6/5/2023    Perri Scherer is a 57 year old female who presents for a preoperative evaluation.      6/5/2023     6:41 AM   Additional Questions   Roomed by Olesya BAILON   Accompanied by self     Surgical Information:  Surgery/Procedure: LEFT TYMPANOMASTOIDECTOMY, WITH FACIAL NERVE MONITORING, POSSIBLE CARTILAGE BACKING  [place] [facility] [place]geon: Dr. Ruchi Rausch  Surgery Date: 6/14/23  Time of Surgery: 7:15am  Where patient plans to recover: At home with family  Fax number for surgical facility: Note does not need to be faxed, will be available electronically in Epic.    Assessment & Plan     The proposed surgical procedure is considered INTERMEDIATE risk.    Preop general physical exam      Cholesteatoma, left      Benign essential hypertension  Controlled.    Hypothyroidism, unspecified type  Stable.      - No identified additional risk factors other than previously addressed    Antiplatelet or Anticoagulation Medication Instructions:   - Patient is on no antiplatelet or anticoagulation medications.    Additional Medication Instructions:  Patient is to take all scheduled medications on the day of surgery    RECOMMENDATION:  APPROVAL GIVEN to proceed with proposed procedure, without further diagnostic evaluation.        Subjective       HPI related to upcoming procedure: Left cholesteatoma, planned procedure to address.          6/5/2023     6:43 AM   Preop Questions   1. Have you ever had a heart attack or stroke? No   2. Have you ever had surgery on your heart or blood vessels, such as a stent placement, a coronary artery bypass, or surgery on an artery in your head, neck, heart, or legs? No   3. Do you have chest pain with activity? No   4. Do you have a history of  heart failure? No    5. Do you currently have a cold, bronchitis or symptoms of other infection? No   6. Do you have a cough, shortness of breath, or wheezing? No   7. Do you or anyone in your family have previous history of blood clots? UNKNOWN -    8. Do you or does anyone in your family have a serious bleeding problem such as prolonged bleeding following surgeries or cuts? No   9. Have you ever had problems with anemia or been told to take iron pills? No   10. Have you had any abnormal blood loss such as black, tarry or bloody stools, or abnormal vaginal bleeding? No   11. Have you ever had a blood transfusion? No   12. Are you willing to have a blood transfusion if it is medically needed before, during, or after your surgery? Yes   13. Have you or any of your relatives ever had problems with anesthesia? No   14. Do you have sleep apnea, excessive snoring or daytime drowsiness? No   15. Do you have any artifical heart valves or other implanted medical devices like a pacemaker, defibrillator, or continuous glucose monitor? No   16. Do you have artificial joints? No   17. Are you allergic to latex? No   18. Is there any chance that you may be pregnant? No     Health Care Directive:  Patient does not have a Health Care Directive or Living Will:     Preoperative Review of :   reviewed - no record of controlled substances prescribed.      Status of Chronic Conditions:  HYPERTENSION - Patient has longstanding history of HTN , currently denies any symptoms referable to elevated blood pressure. Specifically denies chest pain, palpitations, dyspnea, orthopnea, PND or peripheral edema. Blood pressure readings have been in normal range. Current medication regimen is as listed below. Patient denies any side effects of medication.     HYPOTHYROIDISM - Patient has a longstanding history of chronic Hypothyroidism. Patient has been doing well, noting no tremor, insomnia, hair loss or changes in skin texture. Continues to take medications as  directed, without adverse reactions or side effects. Last TSH   Lab Results   Component Value Date    TSH 1.39 03/23/2023   .        Review of Systems  CONSTITUTIONAL: NEGATIVE for fever, chills, change in weight  INTEGUMENTARY/SKIN: NEGATIVE for worrisome rashes, moles or lesions  EYES: NEGATIVE for vision changes or irritation  ENT/MOUTH: NEGATIVE for ear, mouth and throat problems  RESP: NEGATIVE for significant cough or SOB  CV: NEGATIVE for chest pain, palpitations or peripheral edema  GI: NEGATIVE for nausea, abdominal pain, heartburn, or change in bowel habits  : NEGATIVE for frequency, dysuria, or hematuria  MUSCULOSKELETAL: NEGATIVE for significant arthralgias or myalgia  NEURO: NEGATIVE for weakness, dizziness or paresthesias  ENDOCRINE: NEGATIVE for temperature intolerance, skin/hair changes  HEME: NEGATIVE for bleeding problems  PSYCHIATRIC: NEGATIVE for changes in mood or affect    Patient Active Problem List    Diagnosis Date Noted     Cholesteatoma, left 06/02/2023     Priority: Medium     Added automatically from request for surgery [id number]       Hypothyroidism, unspecified type 05/01/2018     Priority: Medium     Other migraine without status migrainosus, not intractable 05/01/2018     Priority: Medium     Benign essential hypertension 07/17/2017     Priority: Medium     Hypothyroidism 04/25/2016     Priority: Medium     Hyperlipidemia LDL goal <160 01/22/2015     Priority: Medium     Nicotine use disorder 01/19/2015     Priority: Medium     E-cigs       Family history of diabetes mellitus 01/19/2015     Priority: Medium      Past Medical History:   Diagnosis Date     HTN (hypertension), benign      Hypothyroidism      Psoriasis      Smoking      Past Surgical History:   Procedure Laterality Date     HC REPAIR OF HAMMERTOE,ONE      Description: Arthroplasty For Hammertoe;  Recorded: 05/01/2008;  Comments: B/L     TUBAL LIGATION  1992     [facility] LIGATE FALLOPIAN TUBE      Description: Tubal Ligation;   "Recorded: 2008;     Current Outpatient Medications   Medication Sig Dispense Refill     butalbital-acetaminophen-caffeine (FIORICET/ESGIC) -40 MG per tablet Take 1 tablet by mouth every 6 hours as needed 15 tablet 0     clobetasol (TEMOVATE) 0.05 % external cream Apply topically daily as needed (psoriasis) Apply sparingly to affected area twice daily for 14 days.  Do not apply to face. 30 g 0     levothyroxine (SYNTHROID/LEVOTHROID) 75 MCG tablet Take 1 tablet (75 mcg) by mouth daily 90 tablet 3     lisinopril (ZESTRIL) 20 MG tablet Take 1 tablet (20 mg) by mouth daily 90 tablet 3     nystatin-triamcinolone (MYCOLOG II) cream Apply topically 2 times daily 60 g 1     SUMAtriptan (IMITREX) 50 MG tablet Take 1 tablet (50 mg) by mouth at onset of headache for migraine May repeat in 2 hours. Max 4 tablets/24 hours. 9 tablet 1     tacrolimus (PROTOPIC) 0.1 % ointment Apply twice daily as needed. 30 g 1       Allergies   Allergen Reactions     Norvasc [Amlodipine] Other (See Comments)     Dizziness          Social History     Tobacco Use     Smoking status: Every Day     Packs/day: 0.50     Types: Other, Cigarettes     Last attempt to quit: 2017     Years since quittin.5     Smokeless tobacco: Never     Tobacco comments:     E-cig 3mg; 5-6 cigarettes a day    Vaping Use     Vaping status: Former     Substances: Nicotine     Devices: [employer] tank   Substance Use Topics     Alcohol use: Yes     Comment: weekends mostly.  10 beers per week     Family History   Problem Relation Age of Onset     Diabetes Mother      C.A.D. Father      Alzheimer Disease Maternal Grandfather      Substance Abuse Brother      Other - See Comments Sister         plane crash     Melanoma No family hx of      History   Drug Use No         Objective     /72   Pulse 62   Temp 97.9  F (36.6  C) (Tympanic)   Resp 16   Ht 1.6 m (5' 3\")   Wt 49.9 kg (110 lb)   SpO2 98%   BMI 19.49 kg/m      Physical Exam    GENERAL " APPEARANCE: healthy, alert and no distress     EYES: EOMI, PERRL     HENT: View of left TM obstructed by purulent material, right TM normal, nose and mouth without ulcers or lesions     NECK: no adenopathy, no asymmetry, masses, or scars and thyroid normal to palpation     RESP: lungs clear to auscultation - no rales, rhonchi or wheezes     CV: regular rates and rhythm, normal S1 S2, no S3 or S4 and no murmur, click or rub     ABDOMEN:  soft, nontender, no HSM or masses and bowel sounds normal     MS: extremities normal- no gross deformities noted, no evidence of inflammation in joints, FROM in all extremities.     SKIN: no suspicious lesions or rashes     NEURO: Normal strength and tone, sensory exam grossly normal, mentation intact and speech normal     PSYCH: mentation appears normal. and affect normal/bright     LYMPHATICS: No cervical adenopathy    Recent Labs   Lab Test 11/29/22  1415 08/16/21  0732    139   POTASSIUM 4.3 4.4   CR 0.60 0.72        Diagnostics:  No labs were ordered during this visit.   No EKG required, no history of coronary heart disease, significant arrhythmia, peripheral arterial disease or other structural heart disease.    Revised Cardiac Risk Index (RCRI):  The patient has the following serious cardiovascular risks for perioperative complications:   - No serious cardiac risks = 0 points     RCRI Interpretation: 0 points: Class I (very low risk - 0.4% complication rate)           Signed Electronically by: KATEY Zamora CNP  Copy of this evaluation report is provided to requesting physician.

## 2023-06-13 ENCOUNTER — ANESTHESIA EVENT (OUTPATIENT)
Dept: SURGERY | Facility: AMBULATORY SURGERY CENTER | Age: 58
End: 2023-06-13
Payer: COMMERCIAL

## 2023-06-14 ENCOUNTER — HOSPITAL ENCOUNTER (OUTPATIENT)
Facility: AMBULATORY SURGERY CENTER | Age: 58
Discharge: HOME | End: 2023-06-14
Attending: OTOLARYNGOLOGY
Payer: COMMERCIAL

## 2023-06-14 ENCOUNTER — ANESTHESIA (OUTPATIENT)
Dept: SURGERY | Facility: AMBULATORY SURGERY CENTER | Age: 58
End: 2023-06-14
Payer: COMMERCIAL

## 2023-06-14 VITALS
DIASTOLIC BLOOD PRESSURE: 72 MMHG | TEMPERATURE: 98 F | SYSTOLIC BLOOD PRESSURE: 114 MMHG | HEART RATE: 72 BPM | BODY MASS INDEX: 18.96 KG/M2 | HEIGHT: 63 IN | WEIGHT: 107 LBS | RESPIRATION RATE: 16 BRPM | OXYGEN SATURATION: 96 %

## 2023-06-14 DIAGNOSIS — G89.18 POSTOPERATIVE PAIN: Primary | ICD-10-CM

## 2023-06-14 DIAGNOSIS — H71.92 CHOLESTEATOMA, LEFT: ICD-10-CM

## 2023-06-14 PROCEDURE — 69643 REVISE MIDDLE EAR & MASTOID: CPT | Mod: LT | Performed by: OTOLARYNGOLOGY

## 2023-06-14 PROCEDURE — 88304 TISSUE EXAM BY PATHOLOGIST: CPT | Mod: 26 | Performed by: STUDENT IN AN ORGANIZED HEALTH CARE EDUCATION/TRAINING PROGRAM

## 2023-06-14 PROCEDURE — 88311 DECALCIFY TISSUE: CPT | Mod: TC | Performed by: OTOLARYNGOLOGY

## 2023-06-14 PROCEDURE — 69643 REVISE MIDDLE EAR & MASTOID: CPT | Mod: LT

## 2023-06-14 PROCEDURE — 88311 DECALCIFY TISSUE: CPT | Mod: 26 | Performed by: STUDENT IN AN ORGANIZED HEALTH CARE EDUCATION/TRAINING PROGRAM

## 2023-06-14 RX ORDER — LIDOCAINE 40 MG/G
CREAM TOPICAL
Status: DISCONTINUED | OUTPATIENT
Start: 2023-06-14 | End: 2023-06-14 | Stop reason: HOSPADM

## 2023-06-14 RX ORDER — LIDOCAINE HYDROCHLORIDE 20 MG/ML
INJECTION, SOLUTION INFILTRATION; PERINEURAL PRN
Status: DISCONTINUED | OUTPATIENT
Start: 2023-06-14 | End: 2023-06-14

## 2023-06-14 RX ORDER — FENTANYL CITRATE 50 UG/ML
50 INJECTION, SOLUTION INTRAMUSCULAR; INTRAVENOUS EVERY 5 MIN PRN
Status: DISCONTINUED | OUTPATIENT
Start: 2023-06-14 | End: 2023-06-14 | Stop reason: HOSPADM

## 2023-06-14 RX ORDER — ONDANSETRON 2 MG/ML
INJECTION INTRAMUSCULAR; INTRAVENOUS PRN
Status: DISCONTINUED | OUTPATIENT
Start: 2023-06-14 | End: 2023-06-14

## 2023-06-14 RX ORDER — ONDANSETRON 4 MG/1
4 TABLET, ORALLY DISINTEGRATING ORAL EVERY 30 MIN PRN
Status: DISCONTINUED | OUTPATIENT
Start: 2023-06-14 | End: 2023-06-15 | Stop reason: HOSPADM

## 2023-06-14 RX ORDER — PROPOFOL 10 MG/ML
INJECTION, EMULSION INTRAVENOUS CONTINUOUS PRN
Status: DISCONTINUED | OUTPATIENT
Start: 2023-06-14 | End: 2023-06-14

## 2023-06-14 RX ORDER — ACETAMINOPHEN 325 MG/1
650 TABLET ORAL
Status: DISCONTINUED | OUTPATIENT
Start: 2023-06-14 | End: 2023-06-15 | Stop reason: HOSPADM

## 2023-06-14 RX ORDER — ONDANSETRON 2 MG/ML
4 INJECTION INTRAMUSCULAR; INTRAVENOUS EVERY 30 MIN PRN
Status: DISCONTINUED | OUTPATIENT
Start: 2023-06-14 | End: 2023-06-14 | Stop reason: HOSPADM

## 2023-06-14 RX ORDER — ONDANSETRON 2 MG/ML
4 INJECTION INTRAMUSCULAR; INTRAVENOUS EVERY 30 MIN PRN
Status: DISCONTINUED | OUTPATIENT
Start: 2023-06-14 | End: 2023-06-15 | Stop reason: HOSPADM

## 2023-06-14 RX ORDER — OFLOXACIN 3 MG/ML
5 SOLUTION AURICULAR (OTIC) 2 TIMES DAILY
Qty: 10 ML | Refills: 0 | Status: SHIPPED | OUTPATIENT
Start: 2023-06-14 | End: 2023-07-05

## 2023-06-14 RX ORDER — SODIUM CHLORIDE, SODIUM LACTATE, POTASSIUM CHLORIDE, CALCIUM CHLORIDE 600; 310; 30; 20 MG/100ML; MG/100ML; MG/100ML; MG/100ML
INJECTION, SOLUTION INTRAVENOUS CONTINUOUS
Status: DISCONTINUED | OUTPATIENT
Start: 2023-06-14 | End: 2023-06-14 | Stop reason: HOSPADM

## 2023-06-14 RX ORDER — HYDROCODONE BITARTRATE AND ACETAMINOPHEN 5; 325 MG/1; MG/1
1 TABLET ORAL EVERY 6 HOURS PRN
Qty: 10 TABLET | Refills: 0 | Status: SHIPPED | OUTPATIENT
Start: 2023-06-14 | End: 2023-06-17

## 2023-06-14 RX ORDER — ACETAMINOPHEN 325 MG/1
975 TABLET ORAL ONCE
Status: DISCONTINUED | OUTPATIENT
Start: 2023-06-14 | End: 2023-06-14 | Stop reason: HOSPADM

## 2023-06-14 RX ORDER — ONDANSETRON 4 MG/1
4 TABLET, ORALLY DISINTEGRATING ORAL EVERY 30 MIN PRN
Status: DISCONTINUED | OUTPATIENT
Start: 2023-06-14 | End: 2023-06-14 | Stop reason: HOSPADM

## 2023-06-14 RX ORDER — OXYCODONE HYDROCHLORIDE 5 MG/1
5 TABLET ORAL
Status: DISCONTINUED | OUTPATIENT
Start: 2023-06-14 | End: 2023-06-15 | Stop reason: HOSPADM

## 2023-06-14 RX ORDER — CEFAZOLIN SODIUM 2 G/50ML
2 SOLUTION INTRAVENOUS SEE ADMIN INSTRUCTIONS
Status: DISCONTINUED | OUTPATIENT
Start: 2023-06-14 | End: 2023-06-14 | Stop reason: HOSPADM

## 2023-06-14 RX ORDER — FENTANYL CITRATE 50 UG/ML
25 INJECTION, SOLUTION INTRAMUSCULAR; INTRAVENOUS EVERY 5 MIN PRN
Status: DISCONTINUED | OUTPATIENT
Start: 2023-06-14 | End: 2023-06-14 | Stop reason: HOSPADM

## 2023-06-14 RX ORDER — HYDROMORPHONE HYDROCHLORIDE 1 MG/ML
0.2 INJECTION, SOLUTION INTRAMUSCULAR; INTRAVENOUS; SUBCUTANEOUS EVERY 5 MIN PRN
Status: DISCONTINUED | OUTPATIENT
Start: 2023-06-14 | End: 2023-06-14 | Stop reason: HOSPADM

## 2023-06-14 RX ORDER — FENTANYL CITRATE 50 UG/ML
INJECTION, SOLUTION INTRAMUSCULAR; INTRAVENOUS PRN
Status: DISCONTINUED | OUTPATIENT
Start: 2023-06-14 | End: 2023-06-14

## 2023-06-14 RX ORDER — HYDROCODONE BITARTRATE AND ACETAMINOPHEN 5; 325 MG/1; MG/1
1 TABLET ORAL
Status: COMPLETED | OUTPATIENT
Start: 2023-06-14 | End: 2023-06-14

## 2023-06-14 RX ORDER — DEXAMETHASONE SODIUM PHOSPHATE 10 MG/ML
10 INJECTION, SOLUTION INTRAMUSCULAR; INTRAVENOUS ONCE
Status: COMPLETED | OUTPATIENT
Start: 2023-06-14 | End: 2023-06-14

## 2023-06-14 RX ORDER — HYDROMORPHONE HYDROCHLORIDE 1 MG/ML
0.4 INJECTION, SOLUTION INTRAMUSCULAR; INTRAVENOUS; SUBCUTANEOUS EVERY 5 MIN PRN
Status: DISCONTINUED | OUTPATIENT
Start: 2023-06-14 | End: 2023-06-14 | Stop reason: HOSPADM

## 2023-06-14 RX ORDER — CEFAZOLIN SODIUM 2 G/50ML
2 SOLUTION INTRAVENOUS
Status: COMPLETED | OUTPATIENT
Start: 2023-06-14 | End: 2023-06-14

## 2023-06-14 RX ORDER — EPHEDRINE SULFATE 50 MG/ML
INJECTION, SOLUTION INTRAMUSCULAR; INTRAVENOUS; SUBCUTANEOUS PRN
Status: DISCONTINUED | OUTPATIENT
Start: 2023-06-14 | End: 2023-06-14

## 2023-06-14 RX ORDER — GABAPENTIN 300 MG/1
300 CAPSULE ORAL
Status: COMPLETED | OUTPATIENT
Start: 2023-06-14 | End: 2023-06-14

## 2023-06-14 RX ORDER — PROPOFOL 10 MG/ML
INJECTION, EMULSION INTRAVENOUS PRN
Status: DISCONTINUED | OUTPATIENT
Start: 2023-06-14 | End: 2023-06-14

## 2023-06-14 RX ORDER — ACETAMINOPHEN 325 MG/1
975 TABLET ORAL ONCE
Status: COMPLETED | OUTPATIENT
Start: 2023-06-14 | End: 2023-06-14

## 2023-06-14 RX ORDER — MAGNESIUM HYDROXIDE 1200 MG/15ML
LIQUID ORAL PRN
Status: DISCONTINUED | OUTPATIENT
Start: 2023-06-14 | End: 2023-06-14 | Stop reason: HOSPADM

## 2023-06-14 RX ADMIN — Medication 25 MG: at 07:22

## 2023-06-14 RX ADMIN — Medication 0.5 MG: at 08:28

## 2023-06-14 RX ADMIN — EPHEDRINE SULFATE 10 MG: 50 INJECTION, SOLUTION INTRAMUSCULAR; INTRAVENOUS; SUBCUTANEOUS at 07:55

## 2023-06-14 RX ADMIN — ONDANSETRON 4 MG: 2 INJECTION INTRAMUSCULAR; INTRAVENOUS at 09:39

## 2023-06-14 RX ADMIN — SODIUM CHLORIDE, SODIUM LACTATE, POTASSIUM CHLORIDE, CALCIUM CHLORIDE: 600; 310; 30; 20 INJECTION, SOLUTION INTRAVENOUS at 09:02

## 2023-06-14 RX ADMIN — DEXAMETHASONE SODIUM PHOSPHATE 10 MG: 10 INJECTION, SOLUTION INTRAMUSCULAR; INTRAVENOUS at 07:30

## 2023-06-14 RX ADMIN — PROPOFOL 125 MCG/KG/MIN: 10 INJECTION, EMULSION INTRAVENOUS at 08:59

## 2023-06-14 RX ADMIN — Medication 100 MCG: at 07:26

## 2023-06-14 RX ADMIN — FENTANYL CITRATE 50 MCG: 50 INJECTION, SOLUTION INTRAMUSCULAR; INTRAVENOUS at 07:22

## 2023-06-14 RX ADMIN — GABAPENTIN 300 MG: 300 CAPSULE ORAL at 06:34

## 2023-06-14 RX ADMIN — Medication 100 MCG: at 07:42

## 2023-06-14 RX ADMIN — PROPOFOL 150 MG: 10 INJECTION, EMULSION INTRAVENOUS at 07:22

## 2023-06-14 RX ADMIN — ACETAMINOPHEN 975 MG: 325 TABLET ORAL at 06:34

## 2023-06-14 RX ADMIN — SODIUM CHLORIDE, SODIUM LACTATE, POTASSIUM CHLORIDE, CALCIUM CHLORIDE: 600; 310; 30; 20 INJECTION, SOLUTION INTRAVENOUS at 07:11

## 2023-06-14 RX ADMIN — HYDROCODONE BITARTRATE AND ACETAMINOPHEN 1 TABLET: 5; 325 TABLET ORAL at 10:11

## 2023-06-14 RX ADMIN — ONDANSETRON 4 MG: 2 INJECTION INTRAMUSCULAR; INTRAVENOUS at 10:17

## 2023-06-14 RX ADMIN — LIDOCAINE HYDROCHLORIDE 100 MG: 20 INJECTION, SOLUTION INFILTRATION; PERINEURAL at 07:22

## 2023-06-14 RX ADMIN — FENTANYL CITRATE 50 MCG: 50 INJECTION, SOLUTION INTRAMUSCULAR; INTRAVENOUS at 07:48

## 2023-06-14 RX ADMIN — EPHEDRINE SULFATE 5 MG: 50 INJECTION, SOLUTION INTRAMUSCULAR; INTRAVENOUS; SUBCUTANEOUS at 08:08

## 2023-06-14 RX ADMIN — PROPOFOL 200 MCG/KG/MIN: 10 INJECTION, EMULSION INTRAVENOUS at 07:22

## 2023-06-14 RX ADMIN — FENTANYL CITRATE 50 MCG: 50 INJECTION, SOLUTION INTRAMUSCULAR; INTRAVENOUS at 10:26

## 2023-06-14 RX ADMIN — EPHEDRINE SULFATE 10 MG: 50 INJECTION, SOLUTION INTRAMUSCULAR; INTRAVENOUS; SUBCUTANEOUS at 08:11

## 2023-06-14 RX ADMIN — CEFAZOLIN SODIUM 2 G: 2 SOLUTION INTRAVENOUS at 07:11

## 2023-06-14 NOTE — ANESTHESIA POSTPROCEDURE EVALUATION
Patient: Perri Scherer    Procedure: Procedure(s):  LEFT TYMPANOMASTOIDECTOMY, WITH FACIAL NERVE MONITORING, CARTILAGE BACKING       Anesthesia Type:  General    Note:  Disposition: Outpatient   Postop Pain Control: Uneventful            Sign Out: Well controlled pain   PONV: No   Neuro/Psych: Uneventful            Sign Out: Acceptable/Baseline neuro status   Airway/Respiratory: Uneventful            Sign Out: Acceptable/Baseline resp. status   CV/Hemodynamics: Uneventful            Sign Out: Acceptable CV status; No obvious hypovolemia; No obvious fluid overload   Other NRE: NONE   DID A NON-ROUTINE EVENT OCCUR? No           Last vitals:  Vitals Value Taken Time   /71 06/14/23 1015   Temp 36.7  C (98  F) 06/14/23 1002   Pulse 84 06/14/23 1023   Resp 37 06/14/23 1023   SpO2 98 % 06/14/23 1023   Vitals shown include unvalidated device data.    Electronically Signed By: Yariel Nolan MD  June 14, 2023  11:42 AM

## 2023-06-14 NOTE — OP NOTE
Date of service:  6/14/23    Preoperative diagnosis:  Left cholesteatoma    Postoperative diagnosis:  Left cholesteatoma    Procedures:  1.  Left tympanomastoidectomy with cartilage backing  2.  Facial nerve monitoring x 2 hours    Surgeon:  Ruchi Rausch MD    Fellow:  Karen Menchaca MD    Anesthesia:  General endotracheal    EBL:  20 cc    Specimens:  Left middle ear and mastoid contents    Drains:  none    Complications:  None    Findings:  Cholesteatoma within the antrum and entire epitympanum, incus not attached at the incus buttress and pushed down onto the floor of the antrum, incus and head of malleus removed, stapes intact and mobile, very large scutal defect.    Indications:  Perri Scherer is a 57 year old female who was found to have a left cholesteatoma.  Risks and benefits of the above procedure were had with the patient and informed consent was obtained in the clinic.  This was confirmed in the preoperative area.    Procedure:  The patient was brought into the operating room and placed supine on the operating room table.  A brief had been performed already.  General anesthesia was induced and endotracheal intubation was performed.  The patient was turned 180 degrees.  The area behind the left ear was shaved and marked.  1:100,000 epinephrine was injected into the planned incision as well as the ear canal and conchal bowl.  Facial nerve monitor electrodes were placed on the left face and connected to the nerve monitor.  Impedances were checked and a tap test was performed.  The monitor was used for the entirety of the case.  The patient was then prepped and draped in standard surgical fashion.  Time Out was performed with identification of the patient, side of the procedure and procedures to be done.    Postauricular incision was made down to the temporalis muscle.  Loose areolar temporalis fascia graft was obtained and prepared for grafting purposes.  Standard T-incision was made.  The soft tissues were  elevated off the mastoid cortex to the posterior ear canal.  Simple mastoidectomy was performed with identification of the sigmoid, tegmen and posterior ear canal wall. The sigmoid is somewhat anteriorly placed. The bone was thinned over the lateral sinodural angle and still present but the plate was mobile.  The antrum was identified and noted to be filled with cholesteatoma. The operating microscope was brought into position and used for the remainder of the case. The bone over the antrum was opened and a portion of the cholesteatoma was removed. The lateral semicircular canal was identified although it was not well defined. No dehiscence noted. The epitympanum was opened partially and there was cholesteatoma within the epitympanum. This was removed. As the cholesteatoma was pulled out of the epitympanum and middle ear, betadine came through into the mastoid. The short process of the incus was not initially identified but once the cholesteatoma was removed, the short process was noted to be disconnected from the incus buttress and sitting on the floor of the antrum. The facial nerve was identified in the descending segment. The facial recess was opened. The chorda tympani was identified. The epitympanum was further opened but not entirely to the anterior wall.    Attention was turned to the posterior ear canal skin.  This was elevated medially towards the annulus. As the TM was being elevated posterior superior, the deep retraction pocket with scutal erosion was identified. Canal incisions were made lateral to the scutal defect and the tympanic membrane was inspected. There is a large epitympanic retraction pocket with a significant amount of posterior superior and superior bony ear canal eroded. The tympanomeatal flap was elevated. The ear canal skin is extremely thin. The hypotympanum was entered and noted to be clear. The TM was elevated superiorly and the chorda tympani was identified and the TM taken off the  chorda. The TM was deeply retracted into the antrum. Due to the disconnection of the incus from the buttress, the decision was made to remove the incus. The long process of the incus was identified and noted to be clean. The mesotympanum is clear. The incudostapedial joint was sectioned and the incus was removed.  The TM was taken off the malleus and the tensor tympani tendon was section. The TM is wrapped entirely around the head of the malleus. The head of the malleus was cut and removed. The chorda tympani was involved in the cholesteatoma so it was excised. The edges of the large scutal erosion were smoothed with the drill. The epitympanum was explored through the mastoid cavity and there was noted to be epithelium on the roof of the epitympanum which was removed. The middle ear and mastoid were irrigated with normal saline. At this point, the middle ear and mastoid appeared free of disease. Approximately 50% of the inferior TM remains.    Conchal bowl cartilage was harvested and prepared for grafting. Gelfoam was placed into the middle ear and the epitympanum and antrum to fill the large scutal defect. Cartilage was placed over this to cover the scutal defect. The fascia graft was laid over the cartilage and up against the superior and posterior ear canal wall and under the native TM.  The graft and tympanomeatal flap were placed back in anatomic position.  Gelfoam was placed on top of the repair.  The T-incision was closed and the postauricular incision closed in multiple layers.  The ear canal was inspected under the microscope and the lateral flap put back into position.  The ear canal was then filled with gelfoam.    The patient tolerated the procedure well and all counts were correct.  The facial nerve electrodes were removed and a Farzana dressing placed.  The patient was then returned to anesthesia, awakened, extubated and taken to the PACU in stable condition.    Ruchi ANDREA MD, was scrubbed  during the entire procedure.

## 2023-06-14 NOTE — DISCHARGE INSTRUCTIONS
"POST SURGERY INSTRUCTIONS     1. Resume your home medications. Take pain medication, Tylenol or ibuprofen as needed. Use docusate to avoid constipation. Start your ear drops in 1 week after surgery and use until your follow up.    2. Wound care  * You can remove your head dressing tomorrow.   * Change the cotton ball in your ear as needed for drainage.  It's normal to expect some drainage from your ear for the first few days after surgery.    3. No shower until 48 hours after surgery. Keep incision clean and dry, do not scrub, let water and soap run over incision.  Use a cotton ball coated with vasoline to keep water out of ear canal.    4. For the next week, no heavy lifting more than 5 pounds, no strenuous activities. No nose blowing. Sneeze with your mouth open.    5. Please call MD or come to the ED for shortness of breath, trouble breathing, inability to tolerate liquids, intractable dizziness, or signs of infection such as fevers or redness.      Call the [phone] clinic number if you have any questions and concerns during the day and call [phone] at night and ask for \"ENT resident on call\".     6. Follow up with Dr. Rausch as previously scheduled.      [facility]  Home Care Following Anesthesia  For 24 hours after surgery:  Get plenty of rest.  A responsible adult must stay with you for at least 24 hours after you leave the surgery center.  Do not drive or use heavy equipment.  If you have weakness or tingling, don't drive or use heavy equipment until this feeling goes away.   Do not drink alcohol.   Avoid strenuous or risky activities.  Ask for help when climbing stairs.  You may feel lightheaded.  IF so, sit for a few minutes before standing.  Have someone help you get up.   If you have nausea (feel sick to your stomach): Drink only clear liquids such as apple juice, ginger ale, broth or 7-Up.  Rest may also help.  Be sure to drink enough fluids.  Move to a " regular diet as you feel able.   You may have a slight fever.  Call the doctor if your fever is over 100 F (37.7 C) (taken under the tongue) or lasts longer than 24 hours.  You may have a dry mouth, a sore throat, muscle aches or trouble sleeping. These should go away after 24 hours.  Do not make important or legal decisions.   It is recommended to avoid smoking.               Tips for taking pain medications  To get the best pain relief possible, remember these points:  Take pain medications as directed, before pain becomes severe.  Pain medication can upset your stomach: taking it with food may help.  Constipation is a common side effect of pain medication. Drink plenty of  fluids.  Eat foods high in fiber. Take a stool softener if recommended by your doctor or pharmacist.  Do not drink alcohol, drive or operate machinery while taking pain medications.  Ask about other ways to control pain, such as with heat, ice or relaxation.    Tylenol/Acetaminophen Consumption  To help encourage the safe use of acetaminophen, the makers of TYLENOL  have lowered the maximum daily dose for single-ingredient Extra Strength TYLENOL  (acetaminophen) products sold in the [place] from 8 pills per day (4,000 mg) to 6 pills per day (3,000 mg). The dosing interval has also changed from 2 pills every 4-6 hours to 2 pills every 6 hours.  If you feel your pain relief is insufficient, you may take Tylenol/Acetaminophen in addition to your narcotic pain medication.   Be careful not to exceed 3,000 mg of Tylenol/Acetaminophen in a 24 hour period from all sources.  If you are taking extra strength Tylenol/acetaminophen (500 mg), the maximum dose is 6 tablets in 24 hours.  If you are taking regular strength acetaminophen (325 mg), the maximum dose is 9 tablets in 24 hours.    Call a doctor for any of the following:  Signs of infection (fever, growing tenderness at the surgery site, a large amount of drainage or bleeding, severe pain, foul-smelling  drainage, redness, swelling).  It has been over 8 to 10 hours since surgery and you are still not able to urinate (pass water).  Headache for over 24 hours.  Numbness, tingling or weakness the day after surgery (if you had spinal anesthesia).  Signs of Covid-19 infection (temperature over 100 degrees, shortness of breath, cough, loss of taste/smell, generalized body aches, persistent headache, chills, sore throat, nausea/vomiting/diarrhea)    Your doctor is:  Dr. Ruchi Rausch, ENT Otolaryngology: [phone]                  Or dial [phone] and ask for the resident on call for:  ENT Otolaryngology  For emergency care, call the:  [place]:  [phone] (TTY for hearing impaired: [phone])    Tylenol 975 mg given at 6:30 am - ok to take more after 12:30 pm today.

## 2023-06-14 NOTE — ANESTHESIA CARE TRANSFER NOTE
Patient: Perri Scherer    Procedure: Procedure(s):  LEFT TYMPANOMASTOIDECTOMY, WITH FACIAL NERVE MONITORING, CARTILAGE BACKING       Diagnosis: Cholesteatoma, left [H71.92]  Diagnosis Additional Information: No value filed.    Anesthesia Type:   No value filed.     Note:    Oropharynx: oropharynx clear of all foreign objects and spontaneously breathing  Level of Consciousness: drowsy  Oxygen Supplementation: room air    Independent Airway: airway patency satisfactory and stable  Dentition: dentition unchanged  Vital Signs Stable: post-procedure vital signs reviewed and stable  Report to RN Given: handoff report given  Patient transferred to: PACU    Handoff Report: Identifed the Patient, Identified the Reponsible Provider, Reviewed the pertinent medical history, Discussed the surgical course, Reviewed Intra-OP anesthesia mangement and issues during anesthesia, Set expectations for post-procedure period and Allowed opportunity for questions and acknowledgement of understanding      Vitals:  Vitals Value Taken Time   /70 06/14/23 1002   Temp 36.7  C (98  F) 06/14/23 1002   Pulse 100 06/14/23 1008   Resp 11 06/14/23 1008   SpO2 95 % 06/14/23 1008   Vitals shown include unvalidated device data.    Electronically Signed By: KATEY Davila CRNA  June 14, 2023  10:09 AM

## 2023-06-14 NOTE — ANESTHESIA PREPROCEDURE EVALUATION
Anesthesia Pre-Procedure Evaluation    Patient: Perri Scherer   MRN: [id number] : 1965        Procedure : Procedure(s):  LEFT TYMPANOMASTOIDECTOMY, WITH FACIAL NERVE MONITORING, POSSIBLE CARTILAGE BACKING          Past Medical History:   Diagnosis Date     HTN (hypertension), benign      Hypothyroidism      Psoriasis      Smoking       Past Surgical History:   Procedure Laterality Date     HC REPAIR OF HAMMERTOE,ONE      Description: Arthroplasty For Hammertoe;  Recorded: 2008;  Comments: B/L     TUBAL LIGATION       ZZC LIGATE FALLOPIAN TUBE      Description: Tubal Ligation;  Recorded: 2008;      Allergies   Allergen Reactions     Norvasc [Amlodipine] Other (See Comments)     Dizziness        Social History     Tobacco Use     Smoking status: Every Day     Packs/day: 0.50     Types: Other, Cigarettes     Last attempt to quit: 2017     Years since quittin.6     Smokeless tobacco: Never     Tobacco comments:     E-cig 3mg; 5-6 cigarettes a day    Vaping Use     Vaping status: Former     Substances: Nicotine     Devices: OnApp tank   Substance Use Topics     Alcohol use: Yes     Comment: weekends mostly.  10 beers per week      Wt Readings from Last 1 Encounters:   23 48.5 kg (107 lb)        Anesthesia Evaluation            ROS/MED HX  ENT/Pulmonary:       Neurologic:     (+) migraines,     Cardiovascular:     (+) Dyslipidemia hypertension-----    METS/Exercise Tolerance:     Hematologic:       Musculoskeletal:       GI/Hepatic:       Renal/Genitourinary:       Endo:     (+) thyroid problem, hypothyroidism,     Psychiatric/Substance Use:       Infectious Disease:       Malignancy:       Other: Comment: L cholesteatoma           Physical Exam    Airway  airway exam normal      Mallampati: II       Respiratory Devices and Support         Dental       (+) Completely normal teeth      Cardiovascular   cardiovascular exam normal          Pulmonary   pulmonary exam normal                 OUTSIDE LABS:  CBC:   Lab Results   Component Value Date    WBC 4.2 04/26/2016    HGB 14.9 04/26/2016    HCT 43.1 04/26/2016     04/26/2016     BMP:   Lab Results   Component Value Date     11/29/2022     08/16/2021    POTASSIUM 4.3 11/29/2022    POTASSIUM 4.4 08/16/2021    CHLORIDE 105 11/29/2022    CHLORIDE 107 08/16/2021    CO2 29 11/29/2022    CO2 29 08/16/2021    BUN 10.4 11/29/2022    BUN 10 08/16/2021    CR 0.60 11/29/2022    CR 0.72 08/16/2021    GLC 90 11/29/2022     (H) 08/16/2021     COAGS: No results found for: PTT, INR, FIBR  POC: No results found for: BGM, HCG, HCGS  HEPATIC:   Lab Results   Component Value Date    ALBUMIN 4.0 04/26/2016    PROTTOTAL 7.2 04/26/2016    ALT 22 04/26/2016    AST 12 04/26/2016    ALKPHOS 81 04/26/2016    BILITOTAL 0.5 04/26/2016     OTHER:   Lab Results   Component Value Date    VERNON 9.6 11/29/2022    TSH 1.39 03/23/2023    T4 1.57 01/27/2023       Anesthesia Plan    ASA Status:  2   NPO Status:  NPO Appropriate    Anesthesia Type: General.     - Airway: ETT   Induction: Intravenous.   Maintenance: Balanced.   Techniques and Equipment:     - Airway: Oral PONCE         Consents    Anesthesia Plan(s) and associated risks, benefits, and realistic alternatives discussed. Questions answered and patient/representative(s) expressed understanding.    - Discussed:     - Discussed with:  Patient      - Extended Intubation/Ventilatory Support Discussed: No.      - Patient is DNR/DNI Status: No    Use of blood products discussed: No .     Postoperative Care    Pain management: Multi-modal analgesia, IV analgesics.   PONV prophylaxis: Ondansetron (or other 5HT-3), Dexamethasone or Solumedrol     Comments:                Yariel Nolan MD

## 2023-06-19 LAB
PATH REPORT.COMMENTS IMP SPEC: NORMAL
PATH REPORT.COMMENTS IMP SPEC: NORMAL
PATH REPORT.FINAL DX SPEC: NORMAL
PATH REPORT.GROSS SPEC: NORMAL
PATH REPORT.MICROSCOPIC SPEC OTHER STN: NORMAL
PATH REPORT.RELEVANT HX SPEC: NORMAL
PHOTO IMAGE: NORMAL

## 2023-07-06 ENCOUNTER — OFFICE VISIT (OUTPATIENT)
Dept: OTOLARYNGOLOGY | Facility: CLINIC | Age: 58
End: 2023-07-06
Payer: COMMERCIAL

## 2023-07-06 VITALS
DIASTOLIC BLOOD PRESSURE: 73 MMHG | BODY MASS INDEX: 19.84 KG/M2 | WEIGHT: 112 LBS | HEART RATE: 60 BPM | SYSTOLIC BLOOD PRESSURE: 111 MMHG | OXYGEN SATURATION: 100 % | HEIGHT: 63 IN

## 2023-07-06 DIAGNOSIS — H71.92 CHOLESTEATOMA, LEFT: Primary | ICD-10-CM

## 2023-07-06 PROCEDURE — 99024 POSTOP FOLLOW-UP VISIT: CPT | Mod: GC | Performed by: OTOLARYNGOLOGY

## 2023-07-06 ASSESSMENT — PAIN SCALES - GENERAL: PAINLEVEL: NO PAIN (0)

## 2023-07-06 NOTE — LETTER
7/6/2023      RE: Perri Scherer  [address] [facility] [address]       Perri Scherer is seen for her first follow up appointment after tympanomastoidectomy with cartilage on 6/14/2023 for cholesteatoma. She reports that she is doing well with no otalgia or otorrhea. Her hearing remains down as expected as her incus and malleus head were removed.    Findings:  Cholesteatoma within the antrum and entire epitympanum, incus not attached at the incus buttress and pushed down onto the floor of the antrum, incus and head of malleus removed, stapes intact and mobile, very large scutal defect.    Physical examination:  female in no acute distress.  Alert and answering questions appropriately.  HB 1/6 bilaterally.  Left ear examined under the microscope. Packing suctioned and removed. Tympanomeatal flap is healing well with normal anterior TM and posterior cartilage graft in situ and TM in neutral position. Continued hemotympanum.     Assessment and plan:    Ms. Scherer is a 57 year old female with hx of cholesteatoma s/p tympanomastoidectomy with cartilage graft on 6/14. We discussed normal postoperative course and provided counseling. We discussed that she will require a second look procedure about 6 months after her initial surgery to ensure that cholesteatoma has been completely removed and possibly proceed with hearing restoration and PET placement.     We will continue floxin gtt for one more week and dry ear precautions. May resume normal activity and wash her hair normally. Follow up in 1 month with same day audiogram.     I, Ruchi Rausch MD, saw this patient with the resident/fellow and agree with the resident/fellow s findings and plan of care as documented in the resident s/fellow s note. I was present for the entire procedure.    Ruchi Rausch MD

## 2023-07-06 NOTE — NURSING NOTE
"Chief Complaint   Patient presents with     RECHECK     3 week post op      Blood pressure 111/73, pulse 60, height 1.6 m (5' 3\"), weight 50.8 kg (112 lb), SpO2 100 %.    Rex Nur LPN    "

## 2023-07-06 NOTE — PATIENT INSTRUCTIONS
1. You were seen in the ENT Clinic today by Dr. Rausch.  If you have any questions or concerns after your appointment, please call   - Option 1: ENT Clinic: [phone]   - Option 2: Jacqueline (Dr. Rausch's Nurse): [phone]          Hina (Dr. Rausch's Nurse): [phone]     2.   Plan to return to clinic in 1 month with hearing test    How to Contact Us:  Send a [employer] message to your provider. Our team will respond to you via [employer]. Occasionally, we will need to call you to get further information.  For urgent matters (Monday-Friday), call the ENT Clinic: [phone] and speak with a call center team member - they will route your call appropriately.   If you'd like to speak directly with a nurse, please find our contact information below. We do our best to check voicemail frequently throughout the day, and will work to call you back within 1-2 days. For urgent matters, please use the general clinic phone numbers listed above.        Jacqueline DILLARD LPN  [facility] - Otolaryngology

## 2023-07-06 NOTE — PROGRESS NOTES
Perri Scherer is seen for her first follow up appointment after tympanomastoidectomy with cartilage on 6/14/2023 for cholesteatoma. She reports that she is doing well with no otalgia or otorrhea. Her hearing remains down as expected as her incus and malleus head were removed.    Findings:  Cholesteatoma within the antrum and entire epitympanum, incus not attached at the incus buttress and pushed down onto the floor of the antrum, incus and head of malleus removed, stapes intact and mobile, very large scutal defect.    Physical examination:  female in no acute distress.  Alert and answering questions appropriately.  HB 1/6 bilaterally.  Left ear examined under the microscope. Packing suctioned and removed. Tympanomeatal flap is healing well with normal anterior TM and posterior cartilage graft in situ and TM in neutral position. Continued hemotympanum.     Assessment and plan:    Ms. Scherer is a 57 year old female with hx of cholesteatoma s/p tympanomastoidectomy with cartilage graft on 6/14. We discussed normal postoperative course and provided counseling. We discussed that she will require a second look procedure about 6 months after her initial surgery to ensure that cholesteatoma has been completely removed and possibly proceed with hearing restoration and PET placement.     We will continue floxin gtt for one more week and dry ear precautions. May resume normal activity and wash her hair normally. Follow up in 1 month with same day audiogram.     I, Ruchi Rausch MD, saw this patient with the resident/fellow and agree with the resident/fellow s findings and plan of care as documented in the resident s/fellow s note. I was present for the entire procedure.    Ruchi Rausch MD

## 2023-08-10 ENCOUNTER — OFFICE VISIT (OUTPATIENT)
Dept: OTOLARYNGOLOGY | Facility: CLINIC | Age: 58
End: 2023-08-10
Payer: COMMERCIAL

## 2023-08-10 ENCOUNTER — OFFICE VISIT (OUTPATIENT)
Dept: AUDIOLOGY | Facility: CLINIC | Age: 58
End: 2023-08-10
Payer: COMMERCIAL

## 2023-08-10 VITALS
WEIGHT: 110 LBS | HEART RATE: 99 BPM | SYSTOLIC BLOOD PRESSURE: 120 MMHG | BODY MASS INDEX: 19.49 KG/M2 | OXYGEN SATURATION: 99 % | DIASTOLIC BLOOD PRESSURE: 79 MMHG | TEMPERATURE: 96.8 F | HEIGHT: 63 IN

## 2023-08-10 DIAGNOSIS — H90.A32 MIXED CONDUCTIVE AND SENSORINEURAL HEARING LOSS OF LEFT EAR WITH RESTRICTED HEARING OF RIGHT EAR: Primary | ICD-10-CM

## 2023-08-10 DIAGNOSIS — H71.92 CHOLESTEATOMA, LEFT: Primary | ICD-10-CM

## 2023-08-10 DIAGNOSIS — H90.A21 SENSORINEURAL HEARING LOSS (SNHL) OF RIGHT EAR WITH RESTRICTED HEARING OF LEFT EAR: ICD-10-CM

## 2023-08-10 DIAGNOSIS — H83.3X1 NOISE-INDUCED HEARING LOSS OF RIGHT EAR WITH RESTRICTED HEARING OF LEFT EAR: ICD-10-CM

## 2023-08-10 PROCEDURE — 99024 POSTOP FOLLOW-UP VISIT: CPT | Mod: GC | Performed by: OTOLARYNGOLOGY

## 2023-08-10 PROCEDURE — 92550 TYMPANOMETRY & REFLEX THRESH: CPT | Mod: 52 | Performed by: AUDIOLOGIST

## 2023-08-10 PROCEDURE — 92557 COMPREHENSIVE HEARING TEST: CPT | Performed by: AUDIOLOGIST

## 2023-08-10 ASSESSMENT — PAIN SCALES - GENERAL: PAINLEVEL: NO PAIN (0)

## 2023-08-10 NOTE — PATIENT INSTRUCTIONS
1. You were seen in the ENT Clinic today by Dr. Rausch.  If you have any questions or concerns after your appointment, please call   - Option 1: ENT Clinic: [phone]   - Option 2: Jacqueline (Dr. Rausch's Nurse): [phone]          Hina (Dr. Rausch's Nurse): [phone]     2.   Plan to return to clinic for post op appt    How to Contact Us:  Send a [employer] message to your provider. Our team will respond to you via [employer]. Occasionally, we will need to call you to get further information.  For urgent matters (Monday-Friday), call the ENT Clinic: [phone] and speak with a call center team member - they will route your call appropriately.   If you'd like to speak directly with a nurse, please find our contact information below. We do our best to check voicemail frequently throughout the day, and will work to call you back within 1-2 days. For urgent matters, please use the general clinic phone numbers listed above.        Jacqueline DILLARD LPN  [facility] - Otolaryngology

## 2023-08-10 NOTE — PROGRESS NOTES
"  Neurotology Clinic      Name: Perri Scherer  MRN: [id number]  Age: 57 year old  : 1965  08/10/2023      Chief Complaint:   Follow up     History of Present Illness:   Perri Scherer is a 57 year old female with a history of left cholesteatoma s/p tympanomastoidectomy with cartilage on 2023 presenting for her second post-operative visit. She reports that she is doing well with no otalgia or otorrhea. She has intermittent tenderness in the area that is not very bothersome. Her hearing remains down as expected as her incus and malleus head were removed. She denies vertigo, otorrhea or vertigo.      Review of Systems:   Pertinent items are noted in HPI or as in patient entered ROS below, remainder of complete ROS is negative.       8/10/2023     7:10 AM    ENT ROS   Neurology Dizzy spells   Ears, Nose, Throat Hearing loss    Ear pain    Ringing/noise in ears    Nasal congestion or drainage         Physical Exam:   /79   Pulse 99   Temp 96.8  F (36  C)   Ht 1.6 m (5' 3\")   Wt 49.9 kg (110 lb)   SpO2 99%   BMI 19.49 kg/m       Constitutional:  The patient was unaccompanied, well-groomed, and in no acute distress.     Skin: Normal:  warm and pink without rash   Neurologic: Alert and oriented x 3.  CN's III-XII within normal limits.  Voice normal.    Psychiatric: The patient's affect was calm, cooperative, and appropriate.     Communication:  Normal; communicates verbally, normal voice quality.   Respiratory: Breathing comfortably without stridor or exertion of accessory muscles.    Head/Face:  No lesions or scars. No sinus tenderness.     Eyes: Pupils were equal and reactive.  Extraocular movement intact.     Ears: Pinnae and tragus non-tender.      Otologic microscope exam:  Right ear: EAC clear. TM without effusion, retraction, or perforation.  Left ear: EAC with some ceruminous crusting that was removed. Posterior cartilage graft well-healed and in good position. TM clear without retraction. " Hemotympanum resolved.    Audiogram:  AUDIOGRAM: She underwent an audiogram today. This demonstrated left 10-30 dB decline in air conduction and 10 dB improvement in bone conduction with stable word recognition.      Right: Speech reception threshold is 10 dB with 100% word recognition. Tympanogram A type   Left: Speech reception threshold is 40 dB with 100% word recognition. Tympanogram DNT type     Audiogram was independently reviewed and compared to her preoperative test    Assessment and Plan:  Ms. Scherer is a 57 year old female with hx of cholesteatoma s/p tympanomastoidectomy with cartilage graft on 6/14. She is healing well postoperatively. We discussed normal postoperative course and provided counseling. We discussed that she will require a second look procedure about 6 months after her initial surgery to ensure that cholesteatoma has been completely removed and possibly proceed with hearing restoration and PET placement.      We will continue floxin gtt for 5 days and dry ear precautions. We will plan to schedule her second look procedure in December.      Follow-up: December for second look surgery    Erinn Mancia MD  Otolaryngology-Head & Neck Surgery PGY-2    I, Ruchi Rausch MD, saw this patient with the resident/fellow and agree with the resident/fellow s findings and plan of care as documented in the resident s/fellow s note. I was present for the entire procedure.    Ruchi Rausch MD

## 2023-08-10 NOTE — NURSING NOTE
"Chief Complaint   Patient presents with    RECHECK     Follow up with audio     Blood pressure 120/79, pulse 99, temperature 96.8  F (36  C), height 1.6 m (5' 3\"), weight 49.9 kg (110 lb), SpO2 99 %.  Rex Nur LPN    "

## 2023-08-10 NOTE — LETTER
"8/10/2023      RE: Perri Scherer  [address] [facility] [address]         Neurotology Clinic      Name: Perri Scherer  MRN: [id number]  Age: 57 year old  : 1965  08/10/2023      Chief Complaint:   Follow up     History of Present Illness:   Perri Scherer is a 57 year old female with a history of left cholesteatoma s/p tympanomastoidectomy with cartilage on 2023 presenting for her second post-operative visit. She reports that she is doing well with no otalgia or otorrhea. She has intermittent tenderness in the area that is not very bothersome. Her hearing remains down as expected as her incus and malleus head were removed. She denies vertigo, otorrhea or vertigo.      Review of Systems:   Pertinent items are noted in HPI or as in patient entered ROS below, remainder of complete ROS is negative.       8/10/2023     7:10 AM    ENT ROS   Neurology Dizzy spells   Ears, Nose, Throat Hearing loss    Ear pain    Ringing/noise in ears    Nasal congestion or drainage         Physical Exam:   /79   Pulse 99   Temp 96.8  F (36  C)   Ht 1.6 m (5' 3\")   Wt 49.9 kg (110 lb)   SpO2 99%   BMI 19.49 kg/m       Constitutional:  The patient was unaccompanied, well-groomed, and in no acute distress.     Skin: Normal:  warm and pink without rash   Neurologic: Alert and oriented x 3.  CN's III-XII within normal limits.  Voice normal.    Psychiatric: The patient's affect was calm, cooperative, and appropriate.     Communication:  Normal; communicates verbally, normal voice quality.   Respiratory: Breathing comfortably without stridor or exertion of accessory muscles.    Head/Face:  No lesions or scars. No sinus tenderness.     Eyes: Pupils were equal and reactive.  Extraocular movement intact.     Ears: Pinnae and tragus non-tender.      Otologic microscope exam:  Right ear: EAC clear. TM without effusion, retraction, or perforation.  Left ear: EAC with some ceruminous crusting that was removed. " Posterior cartilage graft well-healed and in good position. TM clear without retraction. Hemotympanum resolved.    Audiogram:  AUDIOGRAM: She underwent an audiogram today. This demonstrated left 10-30 dB decline in air conduction and 10 dB improvement in bone conduction with stable word recognition.      Right: Speech reception threshold is 10 dB with 100% word recognition. Tympanogram A type   Left: Speech reception threshold is 40 dB with 100% word recognition. Tympanogram DNT type     Audiogram was independently reviewed and compared to her preoperative test    Assessment and Plan:  Ms. Scherer is a 57 year old female with hx of cholesteatoma s/p tympanomastoidectomy with cartilage graft on 6/14. She is healing well postoperatively. We discussed normal postoperative course and provided counseling. We discussed that she will require a second look procedure about 6 months after her initial surgery to ensure that cholesteatoma has been completely removed and possibly proceed with hearing restoration and PET placement.      We will continue floxin gtt for 5 days and dry ear precautions. We will plan to schedule her second look procedure in December.      Follow-up: December for second look surgery    Erinn Mancia MD  Otolaryngology-Head & Neck Surgery PGY-2    I, Ruchi Rausch MD, saw this patient with the resident/fellow and agree with the resident/fellow s findings and plan of care as documented in the resident s/fellow s note. I was present for the entire procedure.    Ruchi Rausch MD

## 2023-08-10 NOTE — PROGRESS NOTES
AUDIOLOGY REPORT    SUMMARY: Audiology visit completed. See audiogram for results.      RECOMMENDATIONS: Follow-up with ENT.      Chhaya Kauffman.  Licensed Audiologist  [place] #[id number]

## 2023-08-29 RX ORDER — ACETAMINOPHEN 325 MG/1
975 TABLET ORAL ONCE
Status: CANCELLED | OUTPATIENT
Start: 2023-08-29 | End: 2023-08-29

## 2023-08-29 RX ORDER — DEXAMETHASONE SODIUM PHOSPHATE 4 MG/ML
10 INJECTION, SOLUTION INTRA-ARTICULAR; INTRALESIONAL; INTRAMUSCULAR; INTRAVENOUS; SOFT TISSUE ONCE
Status: CANCELLED | OUTPATIENT
Start: 2023-08-29 | End: 2023-08-29

## 2023-08-29 RX ORDER — CEFAZOLIN SODIUM 2 G/50ML
2 SOLUTION INTRAVENOUS
Status: CANCELLED | OUTPATIENT
Start: 2023-08-29

## 2023-08-29 RX ORDER — CEFAZOLIN SODIUM 2 G/50ML
2 SOLUTION INTRAVENOUS SEE ADMIN INSTRUCTIONS
Status: CANCELLED | OUTPATIENT
Start: 2023-08-29

## 2023-08-31 ENCOUNTER — TELEPHONE (OUTPATIENT)
Dept: OTOLARYNGOLOGY | Facility: CLINIC | Age: 58
End: 2023-08-31
Payer: COMMERCIAL

## 2023-08-31 NOTE — TELEPHONE ENCOUNTER
Scheduled surgery with Dr. Rausch on 12/13    Spoke with: Patient    Surgery is located at [facility]    Patient will be seen for their H&P by:    PCP Daniel Duran DO within 30 days of surgery - Patient confirmed their PCP on file is up to date    Anesthesia type: General      Requested Imaging required for surgery: NA    Patient is scheduled for their 3 week post op on 1/3 at 845a    Patient will receive their packet via mail per their preference    Additional comments: ALFREDA Meza on 8/31/2023 at 11:16 AM

## 2023-10-02 ENCOUNTER — APPOINTMENT (OUTPATIENT)
Dept: CT IMAGING | Facility: CLINIC | Age: 58
End: 2023-10-02
Attending: STUDENT IN AN ORGANIZED HEALTH CARE EDUCATION/TRAINING PROGRAM
Payer: COMMERCIAL

## 2023-10-02 ENCOUNTER — HOSPITAL ENCOUNTER (EMERGENCY)
Facility: CLINIC | Age: 58
Discharge: HOME | End: 2023-10-02
Attending: STUDENT IN AN ORGANIZED HEALTH CARE EDUCATION/TRAINING PROGRAM | Admitting: STUDENT IN AN ORGANIZED HEALTH CARE EDUCATION/TRAINING PROGRAM
Payer: COMMERCIAL

## 2023-10-02 ENCOUNTER — TELEPHONE (OUTPATIENT)
Dept: FAMILY MEDICINE | Facility: CLINIC | Age: 58
End: 2023-10-02
Payer: COMMERCIAL

## 2023-10-02 VITALS
RESPIRATION RATE: 18 BRPM | HEIGHT: 63 IN | BODY MASS INDEX: 19.14 KG/M2 | HEART RATE: 63 BPM | SYSTOLIC BLOOD PRESSURE: 131 MMHG | WEIGHT: 108 LBS | OXYGEN SATURATION: 98 % | TEMPERATURE: 98.4 F | DIASTOLIC BLOOD PRESSURE: 79 MMHG

## 2023-10-02 DIAGNOSIS — R42 DIZZINESS: ICD-10-CM

## 2023-10-02 LAB
ANION GAP SERPL CALCULATED.3IONS-SCNC: 10 MMOL/L (ref 7–15)
BASO+EOS+MONOS # BLD AUTO: NORMAL 10*3/UL
BASO+EOS+MONOS NFR BLD AUTO: NORMAL %
BASOPHILS # BLD AUTO: 0.1 10E3/UL (ref 0–0.2)
BASOPHILS NFR BLD AUTO: 1 %
BUN SERPL-MCNC: 10.5 MG/DL (ref 6–20)
CALCIUM SERPL-MCNC: 8.8 MG/DL (ref 8.6–10)
CHLORIDE SERPL-SCNC: 102 MMOL/L (ref 98–107)
CREAT SERPL-MCNC: 0.66 MG/DL (ref 0.51–0.95)
DEPRECATED HCO3 PLAS-SCNC: 24 MMOL/L (ref 22–29)
EGFRCR SERPLBLD CKD-EPI 2021: >90 ML/MIN/1.73M2
EOSINOPHIL # BLD AUTO: 0.1 10E3/UL (ref 0–0.7)
EOSINOPHIL NFR BLD AUTO: 2 %
ERYTHROCYTE [DISTWIDTH] IN BLOOD BY AUTOMATED COUNT: 12.5 % (ref 10–15)
GLUCOSE SERPL-MCNC: 88 MG/DL (ref 70–99)
HCT VFR BLD AUTO: 38.8 % (ref 35–47)
HGB BLD-MCNC: 13.6 G/DL (ref 11.7–15.7)
IMM GRANULOCYTES # BLD: 0 10E3/UL
IMM GRANULOCYTES NFR BLD: 0 %
LYMPHOCYTES # BLD AUTO: 1.6 10E3/UL (ref 0.8–5.3)
LYMPHOCYTES NFR BLD AUTO: 31 %
MCH RBC QN AUTO: 30.8 PG (ref 26.5–33)
MCHC RBC AUTO-ENTMCNC: 35.1 G/DL (ref 31.5–36.5)
MCV RBC AUTO: 88 FL (ref 78–100)
MONOCYTES # BLD AUTO: 0.4 10E3/UL (ref 0–1.3)
MONOCYTES NFR BLD AUTO: 7 %
NEUTROPHILS # BLD AUTO: 3.1 10E3/UL (ref 1.6–8.3)
NEUTROPHILS NFR BLD AUTO: 59 %
NRBC # BLD AUTO: 0 10E3/UL
NRBC BLD AUTO-RTO: 0 /100
PLATELET # BLD AUTO: 169 10E3/UL (ref 150–450)
POTASSIUM SERPL-SCNC: 4.2 MMOL/L (ref 3.4–5.3)
RBC # BLD AUTO: 4.41 10E6/UL (ref 3.8–5.2)
SODIUM SERPL-SCNC: 136 MMOL/L (ref 135–145)
WBC # BLD AUTO: 5.2 10E3/UL (ref 4–11)

## 2023-10-02 PROCEDURE — 99284 EMERGENCY DEPT VISIT MOD MDM: CPT | Mod: 25 | Performed by: STUDENT IN AN ORGANIZED HEALTH CARE EDUCATION/TRAINING PROGRAM

## 2023-10-02 PROCEDURE — 70450 CT HEAD/BRAIN W/O DYE: CPT

## 2023-10-02 PROCEDURE — 93005 ELECTROCARDIOGRAM TRACING: CPT | Performed by: STUDENT IN AN ORGANIZED HEALTH CARE EDUCATION/TRAINING PROGRAM

## 2023-10-02 PROCEDURE — 96360 HYDRATION IV INFUSION INIT: CPT | Mod: 59 | Performed by: STUDENT IN AN ORGANIZED HEALTH CARE EDUCATION/TRAINING PROGRAM

## 2023-10-02 PROCEDURE — 70496 CT ANGIOGRAPHY HEAD: CPT

## 2023-10-02 PROCEDURE — 70498 CT ANGIOGRAPHY NECK: CPT

## 2023-10-02 PROCEDURE — 85025 COMPLETE CBC W/AUTO DIFF WBC: CPT | Performed by: STUDENT IN AN ORGANIZED HEALTH CARE EDUCATION/TRAINING PROGRAM

## 2023-10-02 PROCEDURE — 93010 ELECTROCARDIOGRAM REPORT: CPT | Performed by: STUDENT IN AN ORGANIZED HEALTH CARE EDUCATION/TRAINING PROGRAM

## 2023-10-02 PROCEDURE — 36415 COLL VENOUS BLD VENIPUNCTURE: CPT | Performed by: STUDENT IN AN ORGANIZED HEALTH CARE EDUCATION/TRAINING PROGRAM

## 2023-10-02 PROCEDURE — 258N000003 HC RX IP 258 OP 636: Performed by: STUDENT IN AN ORGANIZED HEALTH CARE EDUCATION/TRAINING PROGRAM

## 2023-10-02 PROCEDURE — 80048 BASIC METABOLIC PNL TOTAL CA: CPT | Performed by: STUDENT IN AN ORGANIZED HEALTH CARE EDUCATION/TRAINING PROGRAM

## 2023-10-02 PROCEDURE — 250N000009 HC RX 250: Performed by: STUDENT IN AN ORGANIZED HEALTH CARE EDUCATION/TRAINING PROGRAM

## 2023-10-02 PROCEDURE — 250N000011 HC RX IP 250 OP 636: Performed by: STUDENT IN AN ORGANIZED HEALTH CARE EDUCATION/TRAINING PROGRAM

## 2023-10-02 PROCEDURE — 99285 EMERGENCY DEPT VISIT HI MDM: CPT | Mod: 25 | Performed by: STUDENT IN AN ORGANIZED HEALTH CARE EDUCATION/TRAINING PROGRAM

## 2023-10-02 RX ORDER — IOPAMIDOL 755 MG/ML
67 INJECTION, SOLUTION INTRAVASCULAR ONCE
Status: COMPLETED | OUTPATIENT
Start: 2023-10-02 | End: 2023-10-02

## 2023-10-02 RX ADMIN — SODIUM CHLORIDE 100 ML: 9 INJECTION, SOLUTION INTRAVENOUS at 18:43

## 2023-10-02 RX ADMIN — IOPAMIDOL 67 ML: 755 INJECTION, SOLUTION INTRAVENOUS at 18:43

## 2023-10-02 RX ADMIN — SODIUM CHLORIDE 1000 ML: 9 INJECTION, SOLUTION INTRAVENOUS at 17:51

## 2023-10-02 ASSESSMENT — ACTIVITIES OF DAILY LIVING (ADL)
ADLS_ACUITY_SCORE: 35
ADLS_ACUITY_SCORE: 35

## 2023-10-02 NOTE — ED PROVIDER NOTES
History     Chief Complaint   Patient presents with    Dizziness     X at least two weeks. Feels almost like she's going to faint.      HPI  Perri Scherer is a 57 year old female who has hypothyroidism, hyperlipidemia, hypertension, hx of of migraines, history of left cholesteatoma status post tympanomastoidectomy on 6/14/2023 who presents to the emergency department for evaluation of dizziness.  Patient states that she has had progressively worsening dizziness over the last 2 weeks.  She states that these episodes come on randomly and are fleeting.  She describes getting 10 to 15 seconds of dizziness where she gets lightheaded and feel like she might pass out.  She denies syncope.  She denies vertiginous symptoms such as room spinning.  She states that symptoms can occur with any position such as sitting, lying, standing and are not associated with any movements.  She states that she had similar symptoms earlier in the year that led to her getting the ENT surgery as above.  She is scheduled to have the second part of the surgery done in December.  She endorses some nausea during these events, but no other symptoms.  He has no chest pain, trouble breathing or palpitations.  No abdominal pain.  No fever, chills or recent illness.  No new medications.  She does not drink alcohol.  She denies vomiting.  She denies dysuria, urgency or frequency.  No hematochezia, melena or diarrhea.  She states she has had good p.o. intake.    Allergies:  Allergies   Allergen Reactions    Norvasc [Amlodipine] Other (See Comments)     Dizziness         Problem List:    Patient Active Problem List    Diagnosis Date Noted    Cholesteatoma, left 06/02/2023     Priority: Medium     Added automatically from request for surgery [id number]      Hypothyroidism, unspecified type 05/01/2018     Priority: Medium    Other migraine without status migrainosus, not intractable 05/01/2018     Priority: Medium    Benign essential hypertension 07/17/2017      Priority: Medium    Hypothyroidism 2016     Priority: Medium    Hyperlipidemia LDL goal <160 2015     Priority: Medium    Nicotine use disorder 2015     Priority: Medium     E-cigs      Family history of diabetes mellitus 2015     Priority: Medium        Past Medical History:    Past Medical History:   Diagnosis Date    HTN (hypertension), benign     Hypothyroidism     Psoriasis     Smoking        Past Surgical History:    Past Surgical History:   Procedure Laterality Date    HC REPAIR OF HAMMERTOE,ONE      Description: Arthroplasty For Hammertoe;  Recorded: 2008;  Comments: B/L    TUBAL LIGATION  1992    TYMPANOMASTOIDECTOMY WITH FACIAL MONITORING Left 2023    Procedure: LEFT TYMPANOMASTOIDECTOMY, WITH FACIAL NERVE MONITORING, CARTILAGE BACKING;  Surgeon: Ruchi Rausch MD;  Location: [facility] OR    [facility] LIGATE FALLOPIAN TUBE      Description: Tubal Ligation;  Recorded: 2008;       Family History:    Family History   Problem Relation Age of Onset    Diabetes Mother     C.A.D. Father     Alzheimer Disease Maternal Grandfather     Substance Abuse Brother     Other - See Comments Sister         plane crash    Melanoma No family hx of        Social History:  Marital Status:   [2]  Social History     Tobacco Use    Smoking status: Every Day     Packs/day: 0.50     Types: Other, Cigarettes     Last attempt to quit: 2017     Years since quittin.9    Smokeless tobacco: Never    Tobacco comments:     E-cig 3mg; 5-6 cigarettes a day    Vaping Use    Vaping Use: Former    Substances: Nicotine    Devices: Refillable tank   Substance Use Topics    Alcohol use: Yes     Comment: weekends mostly.  10 beers per week    Drug use: No        Medications:    butalbital-acetaminophen-caffeine (FIORICET/ESGIC) -40 MG per tablet  clobetasol (TEMOVATE) 0.05 % external cream  levothyroxine (SYNTHROID/LEVOTHROID) 75 MCG tablet  lisinopril (ZESTRIL) 20 MG  "tablet  nystatin-triamcinolone (MYCOLOG II) cream  SUMAtriptan (IMITREX) 50 MG tablet  tacrolimus (PROTOPIC) 0.1 % ointment          Review of Systems  The HPI  Physical Exam   BP: 113/73  Pulse: 72  Temp: 98.4  F (36.9  C)  Resp: 16  Height: 160 cm (5' 3\")  Weight: 49 kg (108 lb)  SpO2: 98 %      Physical Exam  /79   Pulse 63   Temp 98.4  F (36.9  C) (Oral)   Resp 18   Ht 1.6 m (5' 3\")   Wt 49 kg (108 lb)   SpO2 98%   BMI 19.13 kg/m    General: alert, interactive, in no apparent distress  Head: atraumatic  Nose: no rhinorrhea or epistaxis  Ears: no external auditory canal discharge or bleeding.    Eyes: Sclera nonicteric. Conjunctiva noninjected. PERRL, EOMI  Mouth: no tonsillar erythema, edema, or exudate.  Moist mucous membranes  Neck: supple, moving spontaneously no midline cervical tenderness  Lungs: No increased work of breathing.  Clear to auscultation bilaterally.  CV: RRR, peripheral pulses palpable and symmetric  Abdomen: soft, nt, nd, no guarding or rebound.   Extremities: Warm and well-perfused.  No edema or calf tenderness.  Skin: no rash or diaphoresis  Neuro: CN II-XII grossly intact, strength 5/5 in UE and LEs bilaterally, sensation intact to light touch in UE and LEs bilaterally; negative pronator drift, normal finger-to-nose    ED Course                 Procedures              EKG Interpretation:      Interpreted by Juan David Raya MD  Time reviewed: 5:58pm  Symptoms at time of EKG: none   Rhythm: normal sinus   Rate: normal  Axis: normal  Ectopy: none  Conduction: normal  ST Segments/ T Waves: No ST-T wave changes  Q Waves: none  Comparison to prior: No old EKG available    Clinical Impression: normal EKG      Critical Care time:  none       Results for orders placed or performed during the hospital encounter of 10/02/23 (from the past 24 hour(s))   CBC with platelets differential    Narrative    The following orders were created for panel order CBC with platelets " differential.  Procedure                               Abnormality         Status                     ---------                               -----------         ------                     CBC with platelets and d...[[id number]]                      Final result                 Please view results for these tests on the individual orders.   Basic metabolic panel   Result Value Ref Range    Sodium 136 135 - 145 mmol/L    Potassium 4.2 3.4 - 5.3 mmol/L    Chloride 102 98 - 107 mmol/L    Carbon Dioxide (CO2) 24 22 - 29 mmol/L    Anion Gap 10 7 - 15 mmol/L    Urea Nitrogen 10.5 6.0 - 20.0 mg/dL    Creatinine 0.66 0.51 - 0.95 mg/dL    GFR Estimate >90 >60 mL/min/1.73m2    Calcium 8.8 8.6 - 10.0 mg/dL    Glucose 88 70 - 99 mg/dL   CBC with platelets and differential   Result Value Ref Range    WBC Count 5.2 4.0 - 11.0 10e3/uL    RBC Count 4.41 3.80 - 5.20 10e6/uL    Hemoglobin 13.6 11.7 - 15.7 g/dL    Hematocrit 38.8 35.0 - 47.0 %    MCV 88 78 - 100 fL    MCH 30.8 26.5 - 33.0 pg    MCHC 35.1 31.5 - 36.5 g/dL    RDW 12.5 10.0 - 15.0 %    Platelet Count 169 150 - 450 10e3/uL    % Neutrophils 59 %    % Lymphocytes 31 %    % Monocytes 7 %    Mids % (Monos, Eos, Basos)      % Eosinophils 2 %    % Basophils 1 %    % Immature Granulocytes 0 %    NRBCs per 100 WBC 0 <1 /100    Absolute Neutrophils 3.1 1.6 - 8.3 10e3/uL    Absolute Lymphocytes 1.6 0.8 - 5.3 10e3/uL    Absolute Monocytes 0.4 0.0 - 1.3 10e3/uL    Mids Abs (Monos, Eos, Basos)      Absolute Eosinophils 0.1 0.0 - 0.7 10e3/uL    Absolute Basophils 0.1 0.0 - 0.2 10e3/uL    Absolute Immature Granulocytes 0.0 <=0.4 10e3/uL    Absolute NRBCs 0.0 10e3/uL   CT Head w/o Contrast    Narrative    EXAM: CTA HEAD NECK W CONTRAST, CT HEAD W/O CONTRAST  LOCATION: [facility]  DATE: 10/2/2023    INDICATION: Persistent dizziness  COMPARISON: None available at time of dictation.  CONTRAST: 67mL of Isovue 370  TECHNIQUE: Head and neck CT angiogram with IV  contrast. Noncontrast head CT followed by axial helical CT images of the head and neck vessels obtained during the arterial phase of intravenous contrast administration. Axial 2D reconstructed images and   multiplanar 3D MIP reconstructed images of the head and neck vessels were performed by the technologist. Dose reduction techniques were used. All stenosis measurements made according to NASCET criteria unless otherwise specified.    FINDINGS:   NONCONTRAST HEAD CT:  INTRACRANIAL CONTENTS: No acute intracranial hemorrhage. No CT evidence of acute infarct. Normal ventricles without hydrocephalus. No extra-axial fluid collection. Patent basal cisterns.    VISUALIZED ORBITS/SINUSES/MASTOIDS: The orbits are unremarkable. The visualized paranasal sinuses are well aerated. Left wall up mastoidectomy changes.     BONES/SOFT TISSUES: The calvarium and skull base are unremarkable.     HEAD CTA:  ANTERIOR CIRCULATION: No stenosis/occlusion, aneurysm, or high flow vascular malformation. Hypoplastic A1 segment of the left anterior cerebral artery The anterior communicating artery is patent. The right posterior communicating artery is   hypoplastic/absent. Fetal origin of the left posterior cerebral artery    POSTERIOR CIRCULATION: No stenosis/occlusion, aneurysm, or high flow vascular malformation. Balanced vertebrobasilar circulation. The basilar artery is unremarkable and gives rise to patent superior cerebellar arteries, right posterior cerebral artery,   and hypoplastic P1 segment of the left posterior cerebral artery.     DURAL VENOUS SINUSES: Patent.    NECK CTA:  RIGHT CAROTID: Normal course and persist caliber of the common carotid artery. Normal course and persist caliber of the extracranial internal carotid artery without evidence of stenosis or dissection.    LEFT CAROTID: Normal course and persist caliber of the common carotid artery. Normal course and persist caliber of the extracranial internal carotid artery  without evidence of stenosis or dissection.    VERTEBRAL ARTERIES: Balance configuration without stenosis or dissection.    AORTIC ARCH: Classic three-vessel arch. The origins of the great vessels are unremarkable without significant stenosis.    NONVASCULAR STRUCTURES: There are no masses or enlarged lymph nodes in the neck. The submandibular, parotid, and thyroid glands are unremarkable. Multilevel degenerative changes without high-grade spinal canal stenosis or neural foraminal narrowing. No   aggressive osseous lesions. The visualized lungs are unremarkable.      Impression    IMPRESSION:    HEAD CT:  1. No acute intracranial abnormality.    HEAD CTA:  1. Variant anatomy, otherwise unremarkable intracranial vasculature.    NECK CTA:  1. Unremarkable neck vasculature   CTA Head Neck with Contrast    Narrative    EXAM: CTA HEAD NECK W CONTRAST, CT HEAD W/O CONTRAST  LOCATION: [facility]  DATE: 10/2/2023    INDICATION: Persistent dizziness  COMPARISON: None available at time of dictation.  CONTRAST: 67mL of Isovue 370  TECHNIQUE: Head and neck CT angiogram with IV contrast. Noncontrast head CT followed by axial helical CT images of the head and neck vessels obtained during the arterial phase of intravenous contrast administration. Axial 2D reconstructed images and   multiplanar 3D MIP reconstructed images of the head and neck vessels were performed by the technologist. Dose reduction techniques were used. All stenosis measurements made according to NASCET criteria unless otherwise specified.    FINDINGS:   NONCONTRAST HEAD CT:  INTRACRANIAL CONTENTS: No acute intracranial hemorrhage. No CT evidence of acute infarct. Normal ventricles without hydrocephalus. No extra-axial fluid collection. Patent basal cisterns.    VISUALIZED ORBITS/SINUSES/MASTOIDS: The orbits are unremarkable. The visualized paranasal sinuses are well aerated. Left wall up mastoidectomy changes.     BONES/SOFT TISSUES:  The calvarium and skull base are unremarkable.     HEAD CTA:  ANTERIOR CIRCULATION: No stenosis/occlusion, aneurysm, or high flow vascular malformation. Hypoplastic A1 segment of the left anterior cerebral artery The anterior communicating artery is patent. The right posterior communicating artery is   hypoplastic/absent. Fetal origin of the left posterior cerebral artery    POSTERIOR CIRCULATION: No stenosis/occlusion, aneurysm, or high flow vascular malformation. Balanced vertebrobasilar circulation. The basilar artery is unremarkable and gives rise to patent superior cerebellar arteries, right posterior cerebral artery,   and hypoplastic P1 segment of the left posterior cerebral artery.     DURAL VENOUS SINUSES: Patent.    NECK CTA:  RIGHT CAROTID: Normal course and persist caliber of the common carotid artery. Normal course and persist caliber of the extracranial internal carotid artery without evidence of stenosis or dissection.    LEFT CAROTID: Normal course and persist caliber of the common carotid artery. Normal course and persist caliber of the extracranial internal carotid artery without evidence of stenosis or dissection.    VERTEBRAL ARTERIES: Balance configuration without stenosis or dissection.    AORTIC ARCH: Classic three-vessel arch. The origins of the great vessels are unremarkable without significant stenosis.    NONVASCULAR STRUCTURES: There are no masses or enlarged lymph nodes in the neck. The submandibular, parotid, and thyroid glands are unremarkable. Multilevel degenerative changes without high-grade spinal canal stenosis or neural foraminal narrowing. No   aggressive osseous lesions. The visualized lungs are unremarkable.      Impression    IMPRESSION:    HEAD CT:  1. No acute intracranial abnormality.    HEAD CTA:  1. Variant anatomy, otherwise unremarkable intracranial vasculature.    NECK CTA:  1. Unremarkable neck vasculature       Medications   sodium chloride 0.9% BOLUS 1,000 mL (0  mLs Intravenous Stopped 10/2/23 1912)   iopamidol (ISOVUE-370) solution 67 mL (67 mLs Intravenous $Given 10/2/23 1843)   sodium chloride 0.9 % bag 500mL for CT scan flush use (100 mLs Intravenous $Given 10/2/23 1843)       Assessments & Plan (with Medical Decision Making)     I have reviewed the nursing notes.    I have reviewed the findings, diagnosis, plan and need for follow up with the patient.    Medical Decision Making  Perri Scherer is a 57 year old female who has hypothyroidism, hyperlipidemia, hypertension, hx of of migraines, history of left cholesteatoma status post tympanomastoidectomy on 6/14/2023 who presents to the emergency department for evaluation of dizziness.  Vital signs are reassuring and within normal limits.  Patient is well-appearing and has a completely normal exam.  Patient's work-up is reviewed and is reassuring.  I did check a head CT and CT angio of the head and neck to evaluate for any critical stenosis or signs of CVA and this was all negative.  BMP, EKG and CBC were all normal.  Etiology of the patient's symptoms is unclear.  Given reassuring work-up, I doubt emergent cause of the patient's presentation and no further work-up is indicated here in the ER.  I did order a Zio patch in case symptoms are due to dysrhythmias.  Return precautions discussed.  All questions answered.  Patient discharged in stable condition.            Discharge Medication List as of 10/2/2023  7:58 PM          Final diagnoses:   Dizziness       10/2/2023   [facility] EMERGENCY DEPT       Juan David Raya MD  10/03/23 0119

## 2023-10-02 NOTE — ED NOTES
Pt states that she had an ear surgery last December.  Had complications after due to infection. Pt states she is dizzy, barely able to walk sometimes due to dizziness.  Also reports intermittent pain behind left ear, describes as shooting.

## 2023-10-02 NOTE — TELEPHONE ENCOUNTER
Reason for Call:  Appointment Request    Patient requesting this type of appt:  dizzy spells x2 weeks having trouble functioning     Requested provider: Daniel Duran    Reason patient unable to be scheduled: Not within requested timeframe    When does patient want to be seen/preferred time: Same day    Comments: patient would like to get in today.     Okay to leave a detailed message?: Yes at Cell number on file:    Telephone Information:   Mobile [phone]       Call taken on 10/2/2023 at 7:17 AM by Gi Keenan

## 2023-10-02 NOTE — ED TRIAGE NOTES
Pt has had off and on dizziness - states had surgery to left ear and is due to have more surgery in December - today with continued dizziness

## 2023-10-02 NOTE — ED TRIAGE NOTES
Writer spoke with pt about treatment and diagnostic options in ED vs. UC. Pt agreed to be evaluated in the ED.

## 2023-10-03 ENCOUNTER — HOSPITAL ENCOUNTER (OUTPATIENT)
Dept: CARDIOLOGY | Facility: CLINIC | Age: 58
Discharge: HOME | End: 2023-10-03
Attending: STUDENT IN AN ORGANIZED HEALTH CARE EDUCATION/TRAINING PROGRAM | Admitting: STUDENT IN AN ORGANIZED HEALTH CARE EDUCATION/TRAINING PROGRAM
Payer: COMMERCIAL

## 2023-10-03 DIAGNOSIS — R42 DIZZINESS: ICD-10-CM

## 2023-10-03 PROCEDURE — 93244 EXT ECG>48HR<7D REV&INTERPJ: CPT | Performed by: INTERNAL MEDICINE

## 2023-10-03 PROCEDURE — 93242 EXT ECG>48HR<7D RECORDING: CPT

## 2023-10-03 NOTE — DISCHARGE INSTRUCTIONS
Today you were seen and evaluated in the emergency department.  You had laboratory studies and a CAT scans of your head and neck that were all normal and no emergent cause of your condition was identified.  You have been ordered to wear a heart monitor, you will get a call to get this set up.  You should follow-up with your regular doctor as soon as you are able.  Return to the ER with new or worsening symptoms.

## 2023-10-30 ENCOUNTER — TELEPHONE (OUTPATIENT)
Dept: OTOLARYNGOLOGY | Facility: CLINIC | Age: 58
End: 2023-10-30
Payer: COMMERCIAL

## 2023-11-20 DIAGNOSIS — E03.9 HYPOTHYROIDISM, UNSPECIFIED TYPE: ICD-10-CM

## 2023-11-21 RX ORDER — LEVOTHYROXINE SODIUM 75 UG/1
75 TABLET ORAL DAILY
Qty: 90 TABLET | Refills: 0 | Status: SHIPPED | OUTPATIENT
Start: 2023-11-21 | End: 2024-05-20

## 2023-12-01 ENCOUNTER — OFFICE VISIT (OUTPATIENT)
Dept: FAMILY MEDICINE | Facility: CLINIC | Age: 58
End: 2023-12-01
Payer: COMMERCIAL

## 2023-12-01 VITALS
HEIGHT: 64 IN | TEMPERATURE: 98.1 F | RESPIRATION RATE: 16 BRPM | OXYGEN SATURATION: 99 % | HEART RATE: 68 BPM | WEIGHT: 112.2 LBS | DIASTOLIC BLOOD PRESSURE: 74 MMHG | SYSTOLIC BLOOD PRESSURE: 118 MMHG | BODY MASS INDEX: 19.15 KG/M2

## 2023-12-01 DIAGNOSIS — E03.9 HYPOTHYROIDISM, UNSPECIFIED TYPE: ICD-10-CM

## 2023-12-01 DIAGNOSIS — I10 BENIGN ESSENTIAL HYPERTENSION: ICD-10-CM

## 2023-12-01 DIAGNOSIS — H71.92 CHOLESTEATOMA, LEFT: ICD-10-CM

## 2023-12-01 DIAGNOSIS — Z01.818 PREOP GENERAL PHYSICAL EXAM: Primary | ICD-10-CM

## 2023-12-01 PROCEDURE — 99214 OFFICE O/P EST MOD 30 MIN: CPT | Performed by: FAMILY MEDICINE

## 2023-12-01 RX ORDER — LISINOPRIL 20 MG/1
20 TABLET ORAL DAILY
Qty: 90 TABLET | Refills: 3 | Status: SHIPPED | OUTPATIENT
Start: 2023-12-01 | End: 2024-06-07

## 2023-12-01 ASSESSMENT — PAIN SCALES - GENERAL: PAINLEVEL: NO PAIN (0)

## 2023-12-01 NOTE — PROGRESS NOTES
"      Allie Rayo is a 58 year old, presenting for the following health issues:  Pre-Op Exam (12/13/2023 LEFT EAR)      12/1/2023     8:24 AM   Additional Questions   Roomed by Sugar Tapia   Accompanied by self         12/1/2023     8:24 AM   Patient Reported Additional Medications   Patient reports taking the following new medications none       HPI   History of left cholesteatoma status post tympanomastoidectomy on 6/14/2023. Now 6 months post so needing to get second surgery done. Couldn't hear at all prior to surgery. Was on meds for ear infection, infection ate the bone.        Hypertension Follow-up    Do you check your blood pressure regularly outside of the clinic? No   Are you following a low salt diet? No.   Are your blood pressures ever more than 140 on the top number (systolic) OR more   than 90 on the bottom number (diastolic), for example 140/90? No          Review of Systems         Objective    /74 (BP Location: Right arm, Patient Position: Sitting, Cuff Size: Adult Regular)   Pulse 68   Temp 98.1  F (36.7  C) (Tympanic)   Resp 16   Ht 1.62 m (5' 3.78\")   Wt 50.9 kg (112 lb 3.2 oz)   SpO2 99%   BMI 19.39 kg/m    Body mass index is 19.39 kg/m .  Physical Exam                     "

## 2023-12-01 NOTE — PATIENT INSTRUCTIONS
Ok to take your thyroid Medications on the day of surgery.  Ok to take lisinopril the evening before surgery.    No NSAIDs (ibuprofen, advil, aleve, aspirin) 7 days before surgery.    Stop all supplements 2 weeks prior to surgery.    OK to take tylenol.  Preparing for Your Surgery  Getting started  A nurse will call you to review your health history and instructions. They will give you an arrival time based on your scheduled surgery time. Please be ready to share:  Your doctor's clinic name and phone number  Your medical, surgical, and anesthesia history  A list of allergies and sensitivities  A list of medicines, including herbal treatments and over-the-counter drugs  Whether the patient has a legal guardian (ask how to send us the papers in advance)  Please tell us if you're pregnant--or if there's any chance you might be pregnant. Some surgeries may injure a fetus (unborn baby), so they require a pregnancy test. Surgeries that are safe for a fetus don't always need a test, and you can choose whether to have one.   If you have a child who's having surgery, please ask for a copy of Preparing for Your Child's Surgery.    Preparing for surgery  Within 10 to 30 days of surgery: Have a pre-op exam (sometimes called an H&P, or History and Physical). This can be done at a clinic or pre-operative center.  If you're having a , you may not need this exam. Talk to your care team.  At your pre-op exam, talk to your care team about all medicines you take. If you need to stop any medicines before surgery, ask when to start taking them again.  We do this for your safety. Many medicines can make you bleed too much during surgery. Some change how well surgery (anesthesia) drugs work.  Call your insurance company to let them know you're having surgery. (If you don't have insurance, call [phone].)  Call your clinic if there's any change in your health. This includes signs of a cold or flu (sore throat, runny nose,  cough, rash, fever). It also includes a scrape or scratch near the surgery site.  If you have questions on the day of surgery, call your hospital or surgery center.  Eating and drinking guidelines  For your safety: Unless your surgeon tells you otherwise, follow the guidelines below.  Eat and drink as usual until 8 hours before you arrive for surgery. After that, no food or milk.  Drink clear liquids until 2 hours before you arrive. These are liquids you can see through, like water, Gatorade, and Propel Water. They also include plain black coffee and tea (no cream or milk), candy, and breath mints. You can spit out gum when you arrive.  If you drink alcohol: Stop drinking it the night before surgery.  If your care team tells you to take medicine on the morning of surgery, it's okay to take it with a sip of water.  Preventing infection  Shower or bathe the night before and morning of your surgery. Follow the instructions your clinic gave you. (If no instructions, use regular soap.)  Don't shave or clip hair near your surgery site. We'll remove the hair if needed.  Don't smoke or vape the morning of surgery. You may chew nicotine gum up to 2 hours before surgery. A nicotine patch is okay.  Note: Some surgeries require you to completely quit smoking and nicotine. Check with your surgeon.  Your care team will make every effort to keep you safe from infection. We will:  Clean our hands often with soap and water (or an alcohol-based hand rub).  Clean the skin at your surgery site with a special soap that kills germs.  Give you a special gown to keep you warm. (Cold raises the risk of infection.)  Wear special hair covers, masks, gowns and gloves during surgery.  Give antibiotic medicine, if prescribed. Not all surgeries need antibiotics.  What to bring on the day of surgery  Photo ID and insurance card  Copy of your health care directive, if you have one  Glasses and hearing aids (bring cases)  You can't wear contacts  during surgery  Inhaler and eye drops, if you use them (tell us about these when you arrive)  CPAP machine or breathing device, if you use them  A few personal items, if spending the night  If you have . . .  A pacemaker, ICD (cardiac defibrillator) or other implant: Bring the ID card.  An implanted stimulator: Bring the remote control.  A legal guardian: Bring a copy of the certified (court-stamped) guardianship papers.  Please remove any jewelry, including body piercings. Leave jewelry and other valuables at home.  If you're going home the day of surgery  You must have a responsible adult drive you home. They should stay with you overnight as well.  If you don't have someone to stay with you, and you aren't safe to go home alone, we may keep you overnight. Insurance often won't pay for this.  After surgery  If it's hard to control your pain or you need more pain medicine, please call your surgeon's office.  Questions?   If you have any questions for your care team, list them here: _________________________________________________________________________________________________________________________________________________________________________ ____________________________________ ____________________________________ ____________________________________  For informational purposes only. Not to replace the advice of your health care provider. Copyright   2003, 2019 [facility]. All rights reserved. Clinically reviewed by Noris Herrera MD. [employer]obee [id number] - REV 12/22.

## 2023-12-01 NOTE — H&P (VIEW-ONLY)
[facility]  [address] [place] [address]  Phone: [phone]  Primary Provider: Daniel Duran  Pre-op Performing Provider: KIM MCGINNIS      PREOPERATIVE EVALUATION:  Today's date: 12/1/2023    Perri is a 58 year old, presenting for the following:  Pre-Op Exam (12/13/2023 LEFT EAR)        12/1/2023     8:24 AM   Additional Questions   Roomed by Sugar Tapia   Accompanied by self         12/1/2023     8:24 AM   Patient Reported Additional Medications   Patient reports taking the following new medications none       Surgical Information:  Surgery/Procedure: LEFT REVISION TYMPANOMASTOIDECTOMY, WITH FACIAL NERVE MONITORING, POSSIBLE CARTILAGE BACKING, POSSIBLE OSSICULAR CHAIN RECONSTRUCTION , COMBINED MYRINGOTOMY, LEFT INSERT TUBE   Sur[place] Location: [facility] [place]  Surgeon: Dr. Rausch  Surgery Date: 12/13/2023  Time of Surgery: 7:15 AM  Where patient plans to recover: At home with family  Fax number for surgical facility: Note does not need to be faxed, will be available electronically in Epic.    Assessment & Plan     The proposed surgical procedure is considered INTERMEDIATE risk.    Preop general physical exam      Cholesteatoma, left  Needing revision    Hypothyroidism, unspecified type  Stable    Benign essential hypertension  Stable, refill  - lisinopril (ZESTRIL) 20 MG tablet; Take 1 tablet (20 mg) by mouth daily             Risks and Recommendations:  The patient has the following additional risks and recommendations for perioperative complications:  Social and Substance:    - Active nicotine user, advised smoking cessation    Antiplatelet or Anticoagulation Medication Instructions:   - Patient is on no antiplatelet or anticoagulation medications.    Additional Medication Instructions:  Patient is on no additional chronic medications    RECOMMENDATION:  APPROVAL GIVEN to proceed with proposed procedure, without further diagnostic evaluation.      Subjective       HPI  related to upcoming procedure: History of left cholesteatoma status post tympanomastoidectomy on 6/14/2023.  Now 6 months post so needing to get second surgery done. Couldn't hear at all prior to surgery. Was on meds for ear infection, infection spread to bone.              12/1/2023     8:04 AM   Preop Questions   1. Have you ever had a heart attack or stroke? No   2. Have you ever had surgery on your heart or blood vessels, such as a stent placement, a coronary artery bypass, or surgery on an artery in your head, neck, heart, or legs? No   3. Do you have chest pain with activity? No   4. Do you have a history of  heart failure? No   5. Do you currently have a cold, bronchitis or symptoms of other infection? No   6. Do you have a cough, shortness of breath, or wheezing? No   7. Do you or anyone in your family have previous history of blood clots? No   8. Do you or does anyone in your family have a serious bleeding problem such as prolonged bleeding following surgeries or cuts? No   9. Have you ever had problems with anemia or been told to take iron pills? No   10. Have you had any abnormal blood loss such as black, tarry or bloody stools, or abnormal vaginal bleeding? No   11. Have you ever had a blood transfusion? No   12. Are you willing to have a blood transfusion if it is medically needed before, during, or after your surgery? Yes   13. Have you or any of your relatives ever had problems with anesthesia? YES - Last time patient had a hard time coming out of the anesthesia. She states it took her 3 days to feel normal.   14. Do you have sleep apnea, excessive snoring or daytime drowsiness? No   15. Do you have any artifical heart valves or other implanted medical devices like a pacemaker, defibrillator, or continuous glucose monitor? No   16. Do you have artificial joints? No   17. Are you allergic to latex? No   18. Is there any chance that you may be pregnant? No       Health Care Directive:  Patient does not  have a Health Care Directive or Living Will: Discussed advance care planning with patient; however, patient declined at this time.    Preoperative Review of :   reviewed - no record of controlled substances prescribed.      Status of Chronic Conditions:  HYPERTENSION - Patient has longstanding history of HTN , currently denies any symptoms referable to elevated blood pressure. Specifically denies chest pain, palpitations, dyspnea, orthopnea, PND or peripheral edema. Blood pressure readings have been in normal range. Current medication regimen is as listed below. Patient denies any side effects of medication.     HYPOTHYROIDISM - Patient has a longstanding history of chronic Hypothyroidism. Patient has been doing well, noting no tremor, insomnia, hair loss or changes in skin texture. Continues to take medications as directed, without adverse reactions or side effects. Last TSH   Lab Results   Component Value Date    TSH 1.39 03/23/2023   .      Review of Systems  CONSTITUTIONAL: NEGATIVE for fever, chills, change in weight  INTEGUMENTARY/SKIN: NEGATIVE for worrisome rashes, moles or lesions  EYES: NEGATIVE for vision changes or irritation  ENT/MOUTH: hearing loss, Hx ear infections, and vertigo  RESP: NEGATIVE for significant cough or SOB  CV: NEGATIVE for chest pain, palpitations or peripheral edema  GI: NEGATIVE for nausea, abdominal pain, heartburn, or change in bowel habits  : NEGATIVE for frequency, dysuria, or hematuria  MUSCULOSKELETAL: NEGATIVE for significant arthralgias or myalgia  NEURO: vertigo  ENDOCRINE: NEGATIVE for temperature intolerance, skin/hair changes  HEME: NEGATIVE for bleeding problems  PSYCHIATRIC: NEGATIVE for changes in mood or affect    Patient Active Problem List    Diagnosis Date Noted    Cholesteatoma, left 06/02/2023     Priority: Medium     Added automatically from request for surgery [id number]      Hypothyroidism, unspecified type 05/01/2018     Priority: Medium    Other  migraine without status migrainosus, not intractable 05/01/2018     Priority: Medium    Benign essential hypertension 07/17/2017     Priority: Medium    Hypothyroidism 04/25/2016     Priority: Medium    Hyperlipidemia LDL goal <160 01/22/2015     Priority: Medium    Nicotine use disorder 01/19/2015     Priority: Medium     E-cigs      Family history of diabetes mellitus 01/19/2015     Priority: Medium      Past Medical History:   Diagnosis Date    HTN (hypertension), benign     Hypothyroidism     Psoriasis     Smoking      Past Surgical History:   Procedure Laterality Date    HC REPAIR OF HAMMERTOE,ONE      Description: Arthroplasty For Hammertoe;  Recorded: 05/01/2008;  Comments: B/L    TUBAL LIGATION  1992    TYMPANOMASTOIDECTOMY WITH FACIAL MONITORING Left 6/14/2023    Procedure: LEFT TYMPANOMASTOIDECTOMY, WITH FACIAL NERVE MONITORING, CARTILAGE BACKING;  Surgeon: Ruchi Rausch MD;  Location: [facility] [place]    [facility] LIGATE FALLOPIAN TUBE      Description: Tubal Ligation;  Recorded: 04/30/2008;     Current Outpatient Medications   Medication Sig Dispense Refill    clobetasol (TEMOVATE) 0.05 % external cream Apply topically daily as needed (psoriasis) Apply sparingly to affected area twice daily for 14 days.  Do not apply to face. 30 g 0    levothyroxine (SYNTHROID/LEVOTHROID) 75 MCG tablet take one tablet EVERY DAY 90 tablet 0    lisinopril (ZESTRIL) 20 MG tablet Take 1 tablet (20 mg) by mouth daily 90 tablet 3    nystatin-triamcinolone (MYCOLOG II) cream Apply topically 2 times daily 60 g 1    SUMAtriptan (IMITREX) 50 MG tablet Take 1 tablet (50 mg) by mouth at onset of headache for migraine May repeat in 2 hours. Max 4 tablets/24 hours. 9 tablet 1    tacrolimus (PROTOPIC) 0.1 % ointment Apply twice daily as needed. 30 g 1    butalbital-acetaminophen-caffeine (FIORICET/ESGIC) -40 MG per tablet Take 1 tablet by mouth every 6 hours as needed (Patient not taking: Reported on 12/1/2023) 15 tablet 0       Allergies  "  Allergen Reactions    Norvasc [Amlodipine] Other (See Comments)     Dizziness          Social History     Tobacco Use    Smoking status: Every Day     Packs/day: .25     Types: Other, Cigarettes     Last attempt to quit: 2017     Years since quittin.0    Smokeless tobacco: Never    Tobacco comments:     E-cig 3mg; 5-6 cigarettes a day    Substance Use Topics    Alcohol use: Yes     Comment: weekends mostly.  10 beers per week       History   Drug Use No         Objective     /74 (BP Location: Right arm, Patient Position: Sitting, Cuff Size: Adult Regular)   Pulse 68   Temp 98.1  F (36.7  C) (Tympanic)   Resp 16   Ht 1.62 m (5' 3.78\")   Wt 50.9 kg (112 lb 3.2 oz)   SpO2 99%   BMI 19.39 kg/m      Physical Exam    GENERAL APPEARANCE: healthy, alert and no distress     EYES: EOMI, PERRL     HENT: ear canals and TM's normal and nose and mouth without ulcers or lesions     NECK: no adenopathy, no asymmetry, masses, or scars and thyroid normal to palpation     RESP: lungs clear to auscultation - no rales, rhonchi or wheezes     CV: regular rates and rhythm, normal S1 S2, no S3 or S4 and no murmur, click or rub     ABDOMEN:  soft, nontender, no HSM or masses and bowel sounds normal     MS: extremities normal- no gross deformities noted, no evidence of inflammation in joints, FROM in all extremities.     SKIN: no suspicious lesions or rashes     NEURO: Normal strength and tone, sensory exam grossly normal, mentation intact and speech normal     PSYCH: mentation appears normal. and affect normal/bright     LYMPHATICS: No cervical adenopathy    Recent Labs   Lab Test 10/02/23  1750 22  1415   HGB 13.6  --      --     141   POTASSIUM 4.2 4.3   CR 0.66 0.60        Diagnostics:  No labs were ordered during this visit. Labs done in October showed normal kidney function and electrolyte function.  No EKG required, no history of coronary heart disease, significant arrhythmia, peripheral " arterial disease or other structural heart disease.    Revised Cardiac Risk Index (RCRI):  The patient has the following serious cardiovascular risks for perioperative complications:   - No serious cardiac risks = 0 points     RCRI Interpretation: 0 points: Class I (very low risk - 0.4% complication rate)         Signed Electronically by: Jeff Nair MD  Copy of this evaluation report is provided to requesting physician.

## 2023-12-01 NOTE — PROGRESS NOTES
[facility]  [address] [address]  Phone: [phone]  Primary Provider: Daniel Duran  Pre-op Performing Provider: KIM MCGINNIS      PREOPERATIVE EVALUATION:  Today's date: 12/1/2023    Perri is a 58 year old, presenting for the following:  Pre-Op Exam (12/13/2023 LEFT EAR)        12/1/2023     8:24 AM   Additional Questions   Roomed by Sugar Tapia   Accompanied by self         12/1/2023     8:24 AM   Patient Reported Additional Medications   Patient reports taking the following new medications none       Surgical Information:  Surgery/Procedure: LEFT REVISION TYMPANOMASTOIDECTOMY, WITH FACIAL NERVE MONITORING, POSSIBLE CARTILAGE BACKING, POSSIBLE OSSICULAR CHAIN RECONSTRUCTION , COMBINED MYRINGOTOMY, LEFT INSERT TUBE   [place] [facility] [place]: Dr. Rausch  Surgery Date: 12/13/2023  Time of Surgery: 7:15 AM  Where patient plans to recover: At home with family  Fax number for surgical facility: Note does not need to be faxed, will be available electronically in Epic.    Assessment & Plan     The proposed surgical procedure is considered INTERMEDIATE risk.    Preop general physical exam      Cholesteatoma, left  Needing revision    Hypothyroidism, unspecified type  Stable    Benign essential hypertension  Stable, refill  - lisinopril (ZESTRIL) 20 MG tablet; Take 1 tablet (20 mg) by mouth daily             Risks and Recommendations:  The patient has the following additional risks and recommendations for perioperative complications:  Social and Substance:    - Active nicotine user, advised smoking cessation    Antiplatelet or Anticoagulation Medication Instructions:   - Patient is on no antiplatelet or anticoagulation medications.    Additional Medication Instructions:  Patient is on no additional chronic medications    RECOMMENDATION:  APPROVAL GIVEN to proceed with proposed procedure, without further diagnostic evaluation.      Subjective       HPI  related to upcoming procedure: History of left cholesteatoma status post tympanomastoidectomy on 6/14/2023.  Now 6 months post so needing to get second surgery done. Couldn't hear at all prior to surgery. Was on meds for ear infection, infection spread to bone.              12/1/2023     8:04 AM   Preop Questions   1. Have you ever had a heart attack or stroke? No   2. Have you ever had surgery on your heart or blood vessels, such as a stent placement, a coronary artery bypass, or surgery on an artery in your head, neck, heart, or legs? No   3. Do you have chest pain with activity? No   4. Do you have a history of  heart failure? No   5. Do you currently have a cold, bronchitis or symptoms of other infection? No   6. Do you have a cough, shortness of breath, or wheezing? No   7. Do you or anyone in your family have previous history of blood clots? No   8. Do you or does anyone in your family have a serious bleeding problem such as prolonged bleeding following surgeries or cuts? No   9. Have you ever had problems with anemia or been told to take iron pills? No   10. Have you had any abnormal blood loss such as black, tarry or bloody stools, or abnormal vaginal bleeding? No   11. Have you ever had a blood transfusion? No   12. Are you willing to have a blood transfusion if it is medically needed before, during, or after your surgery? Yes   13. Have you or any of your relatives ever had problems with anesthesia? YES - Last time patient had a hard time coming out of the anesthesia. She states it took her 3 days to feel normal.   14. Do you have sleep apnea, excessive snoring or daytime drowsiness? No   15. Do you have any artifical heart valves or other implanted medical devices like a pacemaker, defibrillator, or continuous glucose monitor? No   16. Do you have artificial joints? No   17. Are you allergic to latex? No   18. Is there any chance that you may be pregnant? No       Health Care Directive:  Patient does not  have a Health Care Directive or Living Will: Discussed advance care planning with patient; however, patient declined at this time.    Preoperative Review of :   reviewed - no record of controlled substances prescribed.      Status of Chronic Conditions:  HYPERTENSION - Patient has longstanding history of HTN , currently denies any symptoms referable to elevated blood pressure. Specifically denies chest pain, palpitations, dyspnea, orthopnea, PND or peripheral edema. Blood pressure readings have been in normal range. Current medication regimen is as listed below. Patient denies any side effects of medication.     HYPOTHYROIDISM - Patient has a longstanding history of chronic Hypothyroidism. Patient has been doing well, noting no tremor, insomnia, hair loss or changes in skin texture. Continues to take medications as directed, without adverse reactions or side effects. Last TSH   Lab Results   Component Value Date    TSH 1.39 03/23/2023   .      Review of Systems  CONSTITUTIONAL: NEGATIVE for fever, chills, change in weight  INTEGUMENTARY/SKIN: NEGATIVE for worrisome rashes, moles or lesions  EYES: NEGATIVE for vision changes or irritation  ENT/MOUTH: hearing loss, Hx ear infections, and vertigo  RESP: NEGATIVE for significant cough or SOB  CV: NEGATIVE for chest pain, palpitations or peripheral edema  GI: NEGATIVE for nausea, abdominal pain, heartburn, or change in bowel habits  : NEGATIVE for frequency, dysuria, or hematuria  MUSCULOSKELETAL: NEGATIVE for significant arthralgias or myalgia  NEURO: vertigo  ENDOCRINE: NEGATIVE for temperature intolerance, skin/hair changes  HEME: NEGATIVE for bleeding problems  PSYCHIATRIC: NEGATIVE for changes in mood or affect    Patient Active Problem List    Diagnosis Date Noted    Cholesteatoma, left 06/02/2023     Priority: Medium     Added automatically from request for surgery [id number]      Hypothyroidism, unspecified type 05/01/2018     Priority: Medium    Other  migraine without status migrainosus, not intractable 05/01/2018     Priority: Medium    Benign essential hypertension 07/17/2017     Priority: Medium    Hypothyroidism 04/25/2016     Priority: Medium    Hyperlipidemia LDL goal <160 01/22/2015     Priority: Medium    Nicotine use disorder 01/19/2015     Priority: Medium     E-cigs      Family history of diabetes mellitus 01/19/2015     Priority: Medium      Past Medical History:   Diagnosis Date    HTN (hypertension), benign     Hypothyroidism     Psoriasis     Smoking      Past Surgical History:   Procedure Laterality Date    HC REPAIR OF HAMMERTOE,ONE      Description: Arthroplasty For Hammertoe;  Recorded: 05/01/2008;  Comments: B/L    TUBAL LIGATION  1992    TYMPANOMASTOIDECTOMY WITH FACIAL MONITORING Left 6/14/2023    Procedure: LEFT TYMPANOMASTOIDECTOMY, WITH FACIAL NERVE MONITORING, CARTILAGE BACKING;  Surgeon: Ruchi Rausch MD;  Location: [facility] [place]    [facility] LIGATE FALLOPIAN TUBE      Description: Tubal Ligation;  Recorded: 04/30/2008;     Current Outpatient Medications   Medication Sig Dispense Refill    clobetasol (TEMOVATE) 0.05 % external cream Apply topically daily as needed (psoriasis) Apply sparingly to affected area twice daily for 14 days.  Do not apply to face. 30 g 0    levothyroxine (SYNTHROID/LEVOTHROID) 75 MCG tablet take one tablet EVERY DAY 90 tablet 0    lisinopril (ZESTRIL) 20 MG tablet Take 1 tablet (20 mg) by mouth daily 90 tablet 3    nystatin-triamcinolone (MYCOLOG II) cream Apply topically 2 times daily 60 g 1    SUMAtriptan (IMITREX) 50 MG tablet Take 1 tablet (50 mg) by mouth at onset of headache for migraine May repeat in 2 hours. Max 4 tablets/24 hours. 9 tablet 1    tacrolimus (PROTOPIC) 0.1 % ointment Apply twice daily as needed. 30 g 1    butalbital-acetaminophen-caffeine (FIORICET/ESGIC) -40 MG per tablet Take 1 tablet by mouth every 6 hours as needed (Patient not taking: Reported on 12/1/2023) 15 tablet 0       Allergies  "  Allergen Reactions    Norvasc [Amlodipine] Other (See Comments)     Dizziness          Social History     Tobacco Use    Smoking status: Every Day     Packs/day: .25     Types: Other, Cigarettes     Last attempt to quit: 2017     Years since quittin.0    Smokeless tobacco: Never    Tobacco comments:     E-cig 3mg; 5-6 cigarettes a day    Substance Use Topics    Alcohol use: Yes     Comment: weekends mostly.  10 beers per week       History   Drug Use No         Objective     /74 (BP Location: Right arm, Patient Position: Sitting, Cuff Size: Adult Regular)   Pulse 68   Temp 98.1  F (36.7  C) (Tympanic)   Resp 16   Ht 1.62 m (5' 3.78\")   Wt 50.9 kg (112 lb 3.2 oz)   SpO2 99%   BMI 19.39 kg/m      Physical Exam    GENERAL APPEARANCE: healthy, alert and no distress     EYES: EOMI, PERRL     HENT: ear canals and TM's normal and nose and mouth without ulcers or lesions     NECK: no adenopathy, no asymmetry, masses, or scars and thyroid normal to palpation     RESP: lungs clear to auscultation - no rales, rhonchi or wheezes     CV: regular rates and rhythm, normal S1 S2, no S3 or S4 and no murmur, click or rub     ABDOMEN:  soft, nontender, no HSM or masses and bowel sounds normal     MS: extremities normal- no gross deformities noted, no evidence of inflammation in joints, FROM in all extremities.     SKIN: no suspicious lesions or rashes     NEURO: Normal strength and tone, sensory exam grossly normal, mentation intact and speech normal     PSYCH: mentation appears normal. and affect normal/bright     LYMPHATICS: No cervical adenopathy    Recent Labs   Lab Test 10/02/23  1750 22  1415   HGB 13.6  --      --     141   POTASSIUM 4.2 4.3   CR 0.66 0.60        Diagnostics:  No labs were ordered during this visit. Labs done in October showed normal kidney function and electrolyte function.  No EKG required, no history of coronary heart disease, significant arrhythmia, peripheral " arterial disease or other structural heart disease.    Revised Cardiac Risk Index (RCRI):  The patient has the following serious cardiovascular risks for perioperative complications:   - No serious cardiac risks = 0 points     RCRI Interpretation: 0 points: Class I (very low risk - 0.4% complication rate)         Signed Electronically by: Jeff Nair MD  Copy of this evaluation report is provided to requesting physician.

## 2023-12-12 ENCOUNTER — ANESTHESIA EVENT (OUTPATIENT)
Dept: SURGERY | Facility: AMBULATORY SURGERY CENTER | Age: 58
End: 2023-12-12
Payer: COMMERCIAL

## 2023-12-12 NOTE — ANESTHESIA PREPROCEDURE EVALUATION
Anesthesia Pre-Procedure Evaluation    Patient: Perri Scherer   MRN: [id number] : 1965        Procedure : Procedure(s):  LEFT REVISION TYMPANOMASTOIDECTOMY, WITH FACIAL NERVE MONITORING, POSSIBLE CARTILAGE BACKING, POSSIBLE OSSICULAR CHAIN RECONSTRUCTION  COMBINED MYRINGOTOMY, LEFT INSERT TUBE          Past Medical History:   Diagnosis Date    HTN (hypertension), benign     Hypothyroidism     Psoriasis     Smoking       Past Surgical History:   Procedure Laterality Date    HC REPAIR OF HAMMERTOE,ONE      Description: Arthroplasty For Hammertoe;  Recorded: 2008;  Comments: B/L    TUBAL LIGATION  1992    TYMPANOMASTOIDECTOMY WITH FACIAL MONITORING Left 2023    Procedure: LEFT TYMPANOMASTOIDECTOMY, WITH FACIAL NERVE MONITORING, CARTILAGE BACKING;  Surgeon: Ruchi Rausch MD;  Location: [facility] [place]    [facility] LIGATE FALLOPIAN TUBE      Description: Tubal Ligation;  Recorded: 2008;      Allergies   Allergen Reactions    Norvasc [Amlodipine] Other (See Comments)     Dizziness        Social History     Tobacco Use    Smoking status: Every Day     Packs/day: .25     Types: Other, Cigarettes     Last attempt to quit: 2017     Years since quittin.1    Smokeless tobacco: Never    Tobacco comments:     E-cig 3mg; 5-6 cigarettes a day    Substance Use Topics    Alcohol use: Yes     Comment: weekends mostly.  10 beers per week      Wt Readings from Last 1 Encounters:   23 50.9 kg (112 lb 3.2 oz)        Anesthesia Evaluation            ROS/MED HX  ENT/Pulmonary:       Neurologic:     (+)      migraines,                          Cardiovascular:     (+) Dyslipidemia hypertension- -   -  - -                                      METS/Exercise Tolerance:     Hematologic:       Musculoskeletal:       GI/Hepatic:       Renal/Genitourinary:       Endo:     (+)          thyroid problem, hypothyroidism,           Psychiatric/Substance Use:       Infectious Disease:       Malignancy:       Other: Comment: CLARI  "cholesteatoma           Physical Exam    Airway  airway exam normal      Mallampati: II   TM distance: > 3 FB   Neck ROM: full   Mouth opening: > 3 cm    Respiratory Devices and Support         Dental       (+) Completely normal teeth      Cardiovascular   cardiovascular exam normal          Pulmonary   pulmonary exam normal            Other findings:  Placement Date: 06/14/23; Placement Time: 0724; Mask Ventilation: 1; Induction Type: Intravenous; Ease of Intubation: Easy; Technique: Direct laryngoscopy; ETT Type: Oral, Single; Tube Size: 7 mm; DL Blade Size: Jaimes 2; Grade View: 1; Adjucts: Stylet; Placement Person: CRNA; Attempts: 1; Depth: 19 cm    OUTSIDE LABS:  CBC:   Lab Results   Component Value Date    WBC 5.2 10/02/2023    WBC 4.2 04/26/2016    HGB 13.6 10/02/2023    HGB 14.9 04/26/2016    HCT 38.8 10/02/2023    HCT 43.1 04/26/2016     10/02/2023     04/26/2016     BMP:   Lab Results   Component Value Date     10/02/2023     11/29/2022    POTASSIUM 4.2 10/02/2023    POTASSIUM 4.3 11/29/2022    CHLORIDE 102 10/02/2023    CHLORIDE 105 11/29/2022    CO2 24 10/02/2023    CO2 29 11/29/2022    BUN 10.5 10/02/2023    BUN 10.4 11/29/2022    CR 0.66 10/02/2023    CR 0.60 11/29/2022    GLC 88 10/02/2023    GLC 90 11/29/2022     COAGS: No results found for: \"PTT\", \"INR\", \"FIBR\"  POC: No results found for: \"BGM\", \"HCG\", \"HCGS\"  HEPATIC:   Lab Results   Component Value Date    ALBUMIN 4.0 04/26/2016    PROTTOTAL 7.2 04/26/2016    ALT 22 04/26/2016    AST 12 04/26/2016    ALKPHOS 81 04/26/2016    BILITOTAL 0.5 04/26/2016     OTHER:   Lab Results   Component Value Date    VERNON 8.8 10/02/2023    TSH 1.39 03/23/2023    T4 1.57 01/27/2023       Anesthesia Plan    ASA Status:  2    NPO Status:  NPO Appropriate    Anesthesia Type: General.     - Airway: ETT   Induction: Intravenous, Propofol.   Maintenance: Balanced.   Techniques and Equipment:       - Drips/Meds: Remifentanil "     Consents    Anesthesia Plan(s) and associated risks, benefits, and realistic alternatives discussed. Questions answered and patient/representative(s) expressed understanding.     - Discussed:     - Discussed with:  Patient, Spouse      - Extended Intubation/Ventilatory Support Discussed: No.      - Patient is DNR/DNI Status: No     Use of blood products discussed: No .     Postoperative Care    Pain management: IV analgesics, Oral pain medications, Multi-modal analgesia.   PONV prophylaxis: Ondansetron (or other 5HT-3), Dexamethasone or Solumedrol, Background Propofol Infusion     Comments:               Declan Miller MD    I have reviewed the pertinent notes and labs in the chart from the past 30 days and (re)examined the patient.  Any updates or changes from those notes are reflected in this note.

## 2023-12-13 ENCOUNTER — HOSPITAL ENCOUNTER (OUTPATIENT)
Facility: AMBULATORY SURGERY CENTER | Age: 58
Discharge: HOME | End: 2023-12-13
Attending: OTOLARYNGOLOGY
Payer: COMMERCIAL

## 2023-12-13 ENCOUNTER — ANESTHESIA (OUTPATIENT)
Dept: SURGERY | Facility: AMBULATORY SURGERY CENTER | Age: 58
End: 2023-12-13
Payer: COMMERCIAL

## 2023-12-13 VITALS
DIASTOLIC BLOOD PRESSURE: 70 MMHG | OXYGEN SATURATION: 95 % | HEART RATE: 71 BPM | BODY MASS INDEX: 18.44 KG/M2 | WEIGHT: 108 LBS | SYSTOLIC BLOOD PRESSURE: 119 MMHG | TEMPERATURE: 98.4 F | RESPIRATION RATE: 14 BRPM | HEIGHT: 64 IN

## 2023-12-13 DIAGNOSIS — G89.18 POSTOPERATIVE PAIN: Primary | ICD-10-CM

## 2023-12-13 DIAGNOSIS — H69.91 DYSFUNCTION OF RIGHT EUSTACHIAN TUBE: ICD-10-CM

## 2023-12-13 PROCEDURE — 88304 TISSUE EXAM BY PATHOLOGIST: CPT | Mod: 26 | Performed by: PATHOLOGY

## 2023-12-13 PROCEDURE — 69633 REBUILD EARDRUM STRUCTURES: CPT | Mod: LT

## 2023-12-13 PROCEDURE — 88304 TISSUE EXAM BY PATHOLOGIST: CPT | Mod: TC | Performed by: OTOLARYNGOLOGY

## 2023-12-13 PROCEDURE — L8613 OSSICULAR IMPLANT: HCPCS

## 2023-12-13 PROCEDURE — 69601 REVJ MSTDC RSLTG COMPL MSTDC: CPT | Mod: LT

## 2023-12-13 DEVICE — EAR IMP PORP 2MM CENTER TI 70142004: Type: IMPLANTABLE DEVICE | Site: EAR | Status: FUNCTIONAL

## 2023-12-13 RX ORDER — EPHEDRINE SULFATE 50 MG/ML
INJECTION, SOLUTION INTRAMUSCULAR; INTRAVENOUS; SUBCUTANEOUS PRN
Status: DISCONTINUED | OUTPATIENT
Start: 2023-12-13 | End: 2023-12-13

## 2023-12-13 RX ORDER — OFLOXACIN 3 MG/ML
5 SOLUTION AURICULAR (OTIC) 2 TIMES DAILY
Qty: 10 ML | Refills: 0 | Status: SHIPPED | OUTPATIENT
Start: 2023-12-13

## 2023-12-13 RX ORDER — GLYCOPYRROLATE 0.2 MG/ML
INJECTION, SOLUTION INTRAMUSCULAR; INTRAVENOUS PRN
Status: DISCONTINUED | OUTPATIENT
Start: 2023-12-13 | End: 2023-12-13

## 2023-12-13 RX ORDER — DEXAMETHASONE SODIUM PHOSPHATE 10 MG/ML
10 INJECTION, SOLUTION INTRAMUSCULAR; INTRAVENOUS ONCE
Status: DISCONTINUED | OUTPATIENT
Start: 2023-12-13 | End: 2023-12-14 | Stop reason: HOSPADM

## 2023-12-13 RX ORDER — ONDANSETRON 2 MG/ML
4 INJECTION INTRAMUSCULAR; INTRAVENOUS EVERY 30 MIN PRN
Status: DISCONTINUED | OUTPATIENT
Start: 2023-12-13 | End: 2023-12-14 | Stop reason: HOSPADM

## 2023-12-13 RX ORDER — ONDANSETRON 4 MG/1
4 TABLET, ORALLY DISINTEGRATING ORAL EVERY 30 MIN PRN
Status: DISCONTINUED | OUTPATIENT
Start: 2023-12-13 | End: 2023-12-14 | Stop reason: HOSPADM

## 2023-12-13 RX ORDER — OXYCODONE HYDROCHLORIDE 5 MG/1
10 TABLET ORAL
Status: DISCONTINUED | OUTPATIENT
Start: 2023-12-13 | End: 2023-12-14 | Stop reason: HOSPADM

## 2023-12-13 RX ORDER — FENTANYL CITRATE 50 UG/ML
25 INJECTION, SOLUTION INTRAMUSCULAR; INTRAVENOUS
Status: DISCONTINUED | OUTPATIENT
Start: 2023-12-13 | End: 2023-12-14 | Stop reason: HOSPADM

## 2023-12-13 RX ORDER — ACETAMINOPHEN 325 MG/1
975 TABLET ORAL ONCE
Status: COMPLETED | OUTPATIENT
Start: 2023-12-13 | End: 2023-12-13

## 2023-12-13 RX ORDER — FENTANYL CITRATE 50 UG/ML
25 INJECTION, SOLUTION INTRAMUSCULAR; INTRAVENOUS EVERY 5 MIN PRN
Status: DISCONTINUED | OUTPATIENT
Start: 2023-12-13 | End: 2023-12-14 | Stop reason: HOSPADM

## 2023-12-13 RX ORDER — PROPOFOL 10 MG/ML
INJECTION, EMULSION INTRAVENOUS PRN
Status: DISCONTINUED | OUTPATIENT
Start: 2023-12-13 | End: 2023-12-13

## 2023-12-13 RX ORDER — SODIUM CHLORIDE, SODIUM LACTATE, POTASSIUM CHLORIDE, CALCIUM CHLORIDE 600; 310; 30; 20 MG/100ML; MG/100ML; MG/100ML; MG/100ML
INJECTION, SOLUTION INTRAVENOUS CONTINUOUS
Status: DISCONTINUED | OUTPATIENT
Start: 2023-12-13 | End: 2023-12-14 | Stop reason: HOSPADM

## 2023-12-13 RX ORDER — HYDROCODONE BITARTRATE AND ACETAMINOPHEN 5; 325 MG/1; MG/1
1 TABLET ORAL
Status: DISCONTINUED | OUTPATIENT
Start: 2023-12-13 | End: 2023-12-14 | Stop reason: HOSPADM

## 2023-12-13 RX ORDER — LIDOCAINE 40 MG/G
CREAM TOPICAL
Status: DISCONTINUED | OUTPATIENT
Start: 2023-12-13 | End: 2023-12-14 | Stop reason: HOSPADM

## 2023-12-13 RX ORDER — OXYCODONE HYDROCHLORIDE 5 MG/1
5 TABLET ORAL
Status: COMPLETED | OUTPATIENT
Start: 2023-12-13 | End: 2023-12-13

## 2023-12-13 RX ORDER — KETOROLAC TROMETHAMINE 30 MG/ML
INJECTION, SOLUTION INTRAMUSCULAR; INTRAVENOUS PRN
Status: DISCONTINUED | OUTPATIENT
Start: 2023-12-13 | End: 2023-12-13

## 2023-12-13 RX ORDER — CEFAZOLIN SODIUM 2 G/50ML
2 SOLUTION INTRAVENOUS
Status: COMPLETED | OUTPATIENT
Start: 2023-12-13 | End: 2023-12-13

## 2023-12-13 RX ORDER — OFLOXACIN 3 MG/ML
5 SOLUTION AURICULAR (OTIC) 2 TIMES DAILY
Qty: 10 ML | Refills: 0 | Status: SHIPPED | OUTPATIENT
Start: 2023-12-13 | End: 2023-12-13

## 2023-12-13 RX ORDER — HYDROMORPHONE HYDROCHLORIDE 1 MG/ML
0.2 INJECTION, SOLUTION INTRAMUSCULAR; INTRAVENOUS; SUBCUTANEOUS EVERY 5 MIN PRN
Status: DISCONTINUED | OUTPATIENT
Start: 2023-12-13 | End: 2023-12-14 | Stop reason: HOSPADM

## 2023-12-13 RX ORDER — HYDROCODONE BITARTRATE AND ACETAMINOPHEN 5; 325 MG/1; MG/1
1 TABLET ORAL EVERY 6 HOURS PRN
Qty: 10 TABLET | Refills: 0 | Status: SHIPPED | OUTPATIENT
Start: 2023-12-13 | End: 2023-12-16

## 2023-12-13 RX ORDER — CEFAZOLIN SODIUM 2 G/50ML
2 SOLUTION INTRAVENOUS SEE ADMIN INSTRUCTIONS
Status: DISCONTINUED | OUTPATIENT
Start: 2023-12-13 | End: 2023-12-14 | Stop reason: HOSPADM

## 2023-12-13 RX ORDER — DEXAMETHASONE SODIUM PHOSPHATE 10 MG/ML
4 INJECTION, SOLUTION INTRAMUSCULAR; INTRAVENOUS
Status: DISCONTINUED | OUTPATIENT
Start: 2023-12-13 | End: 2023-12-14 | Stop reason: HOSPADM

## 2023-12-13 RX ORDER — FENTANYL CITRATE 50 UG/ML
50 INJECTION, SOLUTION INTRAMUSCULAR; INTRAVENOUS EVERY 5 MIN PRN
Status: DISCONTINUED | OUTPATIENT
Start: 2023-12-13 | End: 2023-12-14 | Stop reason: HOSPADM

## 2023-12-13 RX ORDER — ACETAMINOPHEN 325 MG/1
650 TABLET ORAL
Status: DISCONTINUED | OUTPATIENT
Start: 2023-12-13 | End: 2023-12-14 | Stop reason: HOSPADM

## 2023-12-13 RX ORDER — FENTANYL CITRATE 50 UG/ML
INJECTION, SOLUTION INTRAMUSCULAR; INTRAVENOUS PRN
Status: DISCONTINUED | OUTPATIENT
Start: 2023-12-13 | End: 2023-12-13

## 2023-12-13 RX ORDER — PROPOFOL 10 MG/ML
INJECTION, EMULSION INTRAVENOUS CONTINUOUS PRN
Status: DISCONTINUED | OUTPATIENT
Start: 2023-12-13 | End: 2023-12-13

## 2023-12-13 RX ORDER — DEXAMETHASONE SODIUM PHOSPHATE 4 MG/ML
INJECTION, SOLUTION INTRA-ARTICULAR; INTRALESIONAL; INTRAMUSCULAR; INTRAVENOUS; SOFT TISSUE PRN
Status: DISCONTINUED | OUTPATIENT
Start: 2023-12-13 | End: 2023-12-13

## 2023-12-13 RX ORDER — SODIUM CHLORIDE, SODIUM LACTATE, POTASSIUM CHLORIDE, CALCIUM CHLORIDE 600; 310; 30; 20 MG/100ML; MG/100ML; MG/100ML; MG/100ML
INJECTION, SOLUTION INTRAVENOUS CONTINUOUS PRN
Status: DISCONTINUED | OUTPATIENT
Start: 2023-12-13 | End: 2023-12-13

## 2023-12-13 RX ORDER — ONDANSETRON 2 MG/ML
INJECTION INTRAMUSCULAR; INTRAVENOUS PRN
Status: DISCONTINUED | OUTPATIENT
Start: 2023-12-13 | End: 2023-12-13

## 2023-12-13 RX ORDER — HYDROMORPHONE HYDROCHLORIDE 1 MG/ML
0.4 INJECTION, SOLUTION INTRAMUSCULAR; INTRAVENOUS; SUBCUTANEOUS EVERY 5 MIN PRN
Status: DISCONTINUED | OUTPATIENT
Start: 2023-12-13 | End: 2023-12-14 | Stop reason: HOSPADM

## 2023-12-13 RX ADMIN — FENTANYL CITRATE 50 MCG: 50 INJECTION, SOLUTION INTRAMUSCULAR; INTRAVENOUS at 10:06

## 2023-12-13 RX ADMIN — PROPOFOL 50 MG: 10 INJECTION, EMULSION INTRAVENOUS at 10:06

## 2023-12-13 RX ADMIN — GLYCOPYRROLATE 0.1 MG: 0.2 INJECTION, SOLUTION INTRAMUSCULAR; INTRAVENOUS at 09:45

## 2023-12-13 RX ADMIN — SODIUM CHLORIDE, SODIUM LACTATE, POTASSIUM CHLORIDE, CALCIUM CHLORIDE: 600; 310; 30; 20 INJECTION, SOLUTION INTRAVENOUS at 08:44

## 2023-12-13 RX ADMIN — Medication 100 MCG: at 10:35

## 2023-12-13 RX ADMIN — EPHEDRINE SULFATE 10 MG: 50 INJECTION, SOLUTION INTRAMUSCULAR; INTRAVENOUS; SUBCUTANEOUS at 09:40

## 2023-12-13 RX ADMIN — PROPOFOL 100 MG: 10 INJECTION, EMULSION INTRAVENOUS at 10:41

## 2023-12-13 RX ADMIN — HYDROMORPHONE HYDROCHLORIDE 0.4 MG: 1 INJECTION, SOLUTION INTRAMUSCULAR; INTRAVENOUS; SUBCUTANEOUS at 11:42

## 2023-12-13 RX ADMIN — HYDROMORPHONE HYDROCHLORIDE 0.4 MG: 1 INJECTION, SOLUTION INTRAMUSCULAR; INTRAVENOUS; SUBCUTANEOUS at 11:34

## 2023-12-13 RX ADMIN — PROPOFOL 150 MG: 10 INJECTION, EMULSION INTRAVENOUS at 09:23

## 2023-12-13 RX ADMIN — GLYCOPYRROLATE 0.1 MG: 0.2 INJECTION, SOLUTION INTRAMUSCULAR; INTRAVENOUS at 09:51

## 2023-12-13 RX ADMIN — CEFAZOLIN SODIUM 2 G: 2 SOLUTION INTRAVENOUS at 09:25

## 2023-12-13 RX ADMIN — HYDROMORPHONE HYDROCHLORIDE 0.2 MG: 1 INJECTION, SOLUTION INTRAMUSCULAR; INTRAVENOUS; SUBCUTANEOUS at 11:53

## 2023-12-13 RX ADMIN — FENTANYL CITRATE 50 MCG: 50 INJECTION, SOLUTION INTRAMUSCULAR; INTRAVENOUS at 09:23

## 2023-12-13 RX ADMIN — OXYCODONE HYDROCHLORIDE 5 MG: 5 TABLET ORAL at 11:30

## 2023-12-13 RX ADMIN — FENTANYL CITRATE 50 MCG: 50 INJECTION, SOLUTION INTRAMUSCULAR; INTRAVENOUS at 11:26

## 2023-12-13 RX ADMIN — KETOROLAC TROMETHAMINE 30 MG: 30 INJECTION, SOLUTION INTRAMUSCULAR; INTRAVENOUS at 10:48

## 2023-12-13 RX ADMIN — DEXAMETHASONE SODIUM PHOSPHATE 10 MG: 4 INJECTION, SOLUTION INTRA-ARTICULAR; INTRALESIONAL; INTRAMUSCULAR; INTRAVENOUS; SOFT TISSUE at 09:24

## 2023-12-13 RX ADMIN — Medication 100 MCG: at 10:51

## 2023-12-13 RX ADMIN — FENTANYL CITRATE 50 MCG: 50 INJECTION, SOLUTION INTRAMUSCULAR; INTRAVENOUS at 11:21

## 2023-12-13 RX ADMIN — ONDANSETRON 4 MG: 2 INJECTION INTRAMUSCULAR; INTRAVENOUS at 10:41

## 2023-12-13 RX ADMIN — Medication 200 MCG: at 09:24

## 2023-12-13 RX ADMIN — PROPOFOL 150 MCG/KG/MIN: 10 INJECTION, EMULSION INTRAVENOUS at 09:23

## 2023-12-13 RX ADMIN — SODIUM CHLORIDE, SODIUM LACTATE, POTASSIUM CHLORIDE, CALCIUM CHLORIDE: 600; 310; 30; 20 INJECTION, SOLUTION INTRAVENOUS at 09:16

## 2023-12-13 RX ADMIN — ACETAMINOPHEN 975 MG: 325 TABLET ORAL at 08:37

## 2023-12-13 NOTE — ANESTHESIA POSTPROCEDURE EVALUATION
Patient: Perri Scherer    Procedure: Procedure(s):  LEFT REVISION TYMPANOMASTOIDECTOMY, WITH FACIAL NERVE MONITORING, OSSICULAR CHAIN RECONSTRUCTION  COMBINED MYRINGOTOMY, LEFT INSERT TUBE       Anesthesia Type:  General    Note:  Disposition: Outpatient   Postop Pain Control: Uneventful            Sign Out: Well controlled pain   PONV: No   Neuro/Psych: Uneventful            Sign Out: Acceptable/Baseline neuro status   Airway/Respiratory: Uneventful            Sign Out: Acceptable/Baseline resp. status   CV/Hemodynamics: Uneventful            Sign Out: Acceptable CV status; No obvious hypovolemia; No obvious fluid overload   Other NRE:    DID A NON-ROUTINE EVENT OCCUR?            Last vitals:  Vitals Value Taken Time   /66 12/13/23 1200   Temp 36.9  C (98.4  F) 12/13/23 1200   Pulse 48 12/13/23 1206   Resp 6 12/13/23 1206   SpO2 100 % 12/13/23 1206   Vitals shown include unfiled device data.    Electronically Signed By: Declan Miller MD  December 13, 2023  12:59 PM

## 2023-12-13 NOTE — DISCHARGE INSTRUCTIONS
"1. Resume your home medications. Take pain medication, Tylenol or ibuprofen as needed. Use docusate to avoid constipation. Start your ear drops after surgery and use until your follow up.    2. Wound care  * You can remove your head dressing tomorrow.   * Change the cotton ball in your ear as needed for drainage.  It's normal to expect some drainage from your ear for the first few days after surgery.    3. No shower until 48 hours after surgery. Keep incision clean and dry, do not scrub, let water and soap run over incision.  Use a cotton ball coated with vasoline to keep water out of ear canal.    4. For the next 2 weeks, no heavy lifting more than 5 pounds, no strenuous activities. No nose blowing. Sneeze with your mouth open.    5. Please call MD or come to the ED for shortness of breath, trouble breathing, inability to tolerate liquids, intractable dizziness, or signs of infection such as fevers or redness.      Call the [phone] clinic number if you have any questions and concerns during the day and call [phone] at night and ask for \"ENT resident on call\".     6. Follow up with Dr. Rausch as previously scheduled.    [facility]  Home Care Following Anesthesia  For 24 hours after surgery:  Get plenty of rest.  A responsible adult must stay with you for at least 24 hours after you leave the surgery center.  Do not drive or use heavy equipment.  If you have weakness or tingling, don't drive or use heavy equipment until this feeling goes away.   Do not drink alcohol.   Avoid strenuous or risky activities.  Ask for help when climbing stairs.  You may feel lightheaded.  IF so, sit for a few minutes before standing.  Have someone help you get up.   If you have nausea (feel sick to your stomach): Drink only clear liquids such as apple juice, ginger ale, broth or 7-Up.  Rest may also help.  Be sure to drink enough fluids.  Move to a regular diet as you feel able.   You may " have a slight fever.  Call the doctor if your fever is over 100 F (37.7 C) (taken under the tongue) or lasts longer than 24 hours.  You may have a dry mouth, a sore throat, muscle aches or trouble sleeping. These should go away after 24 hours.  Do not make important or legal decisions.   It is recommended to avoid smoking.               Tips for taking pain medications  To get the best pain relief possible, remember these points:  Take pain medications as directed, before pain becomes severe.  Pain medication can upset your stomach: taking it with food may help.  Constipation is a common side effect of pain medication. Drink plenty of  fluids.  Eat foods high in fiber. Take a stool softener if recommended by your doctor or pharmacist.  Do not drink alcohol, drive or operate machinery while taking pain medications.  Ask about other ways to control pain, such as with heat, ice or relaxation.    Tylenol/Acetaminophen Consumption    If you feel your pain relief is insufficient, you may take Tylenol/Acetaminophen in addition to your narcotic pain medication.   Be careful not to exceed 4,000 mg of Tylenol/Acetaminophen in a 24 hour period from all sources.  If you are taking extra strength Tylenol/acetaminophen (500 mg), the maximum dose is 8 tablets in 24 hours.  If you are taking regular strength acetaminophen (325 mg), the maximum dose is 12 tablets in 24 hours.    Call a doctor for any of the following:  Signs of infection (fever, growing tenderness at the surgery site, a large amount of drainage or bleeding, severe pain, foul-smelling drainage, redness, swelling).  It has been over 8 to 10 hours since surgery and you are still not able to urinate (pass water).  Headache for over 24 hours.  Numbness, tingling or weakness the day after surgery (if you had spinal anesthesia).  Signs of Covid-19 infection (temperature over 100 degrees, shortness of breath, cough, loss of taste/smell, generalized body aches, persistent  headache, chills, sore throat, nausea/vomiting/diarrhea)  Your doctor is:  Dr. Ruchi Rausch, ENT Otolaryngology: [phone]                    Or dial [phone] and ask for the resident on call for:  ENT Otolaryngology  For emergency care, call the:  [facility]:  [phone] (TTY for hearing impaired: [phone])

## 2023-12-13 NOTE — ANESTHESIA CARE TRANSFER NOTE
Patient: Perri Scherer    Procedure: Procedure(s):  LEFT REVISION TYMPANOMASTOIDECTOMY, WITH FACIAL NERVE MONITORING, OSSICULAR CHAIN RECONSTRUCTION  COMBINED MYRINGOTOMY, LEFT INSERT TUBE       Diagnosis: Cholesteatoma, left [H71.92]  Diagnosis Additional Information: No value filed.    Anesthesia Type:   General     Note:      Level of Consciousness: awake  Oxygen Supplementation: face mask    Independent Airway: airway patency satisfactory and stable        Patient transferred to: PACU    Handoff Report: Identifed the Patient, Identified the Reponsible Provider, Reviewed the pertinent medical history, Discussed the surgical course, Reviewed Intra-OP anesthesia mangement and issues during anesthesia, Set expectations for post-procedure period and Allowed opportunity for questions and acknowledgement of understanding      Vitals:  Vitals Value Taken Time   /63 12/13/23 1113   Temp     Pulse 79 12/13/23 1115   Resp 20 12/13/23 1115   SpO2 99 % 12/13/23 1115   Vitals shown include unfiled device data.    Electronically Signed By: KATEY Hoyos CRNA  December 13, 2023  11:17 AM

## 2023-12-13 NOTE — ANESTHESIA PROCEDURE NOTES
Airway       Patient location during procedure: OR       Procedure Start/Stop Times: 12/13/2023 9:25 AM  Staff -        Performed By: CRNAIndications and Patient Condition       Indications for airway management: tiffany-procedural         Mask difficulty assessment: 1 - vent by mask    Final Airway Details       Final airway type: endotracheal airway       Successful airway: ETT - single  Endotracheal Airway Details        ETT size (mm): 7.0       Cuffed: yes       Successful intubation technique: direct laryngoscopy       DL Blade Type: Jaimes 2       Grade View of Cords: 1       Adjucts: stylet       Position: Right       Measured from: gums/teeth       Secured at (cm): 21       Bite block used: Soft    Post intubation assessment        Placement verified by: capnometry and equal breath sounds        Number of attempts at approach: 1       Secured with: tape       Ease of procedure: easy       Dentition: Intact and Unchanged    Medication(s) Administered   Medication Administration Time: 12/13/2023 9:25 AM

## 2023-12-13 NOTE — OP NOTE
Date of service:  12/13/23    Preoperative diagnosis:  Left cholesteatoma    Postoperative diagnosis:  Left conductive hearing loss and eustachian tube dysfunction    Procedures:  1.  Left revision tympanomastoidectomy with ossicular chain reconstruction  2.  Left myringotomy and t-tube placement  3.  Facial nerve monitoring x 1.5 hours    Surgeon:  Ruchi Rausch MD    Fellow:  Lauren Hidalgo MD    Resident:  Ishan Singh MD    Anesthesia:  General endotracheal    EBL:  10 cc    Specimens:  Left middle ear and mastoid contents    Drains:  none    Complications:  None    Findings:  Mastoid and middle ear filled with scar, no cholesteatoma identified, stapes intact and mobile, 2mm PORP placed.    Indications:  Perri Scherer is a 58 year old female who was found to have a left cholesteatoma s/p tympanomastoidectomy.  Risks and benefits of the above procedure were had with the patient and informed consent was obtained in the clinic.  This was confirmed in the preoperative area.    Procedure:  The patient was brought into the operating room and placed supine on the operating room table.  A brief had been performed already.  General anesthesia was induced and endotracheal intubation was performed. 70% nitrous was administered for 5 minutes. The patient was turned 180 degrees.  The area behind the left ear was shaved and marked.  1:100,000 epinephrine was injected into the planned incision as well as the ear canal.  Facial nerve monitor electrodes were placed on the left face and connected to the nerve monitor.  Impedances were checked and a tap test was performed.  The monitor was used for the entirety of the case.  The patient was then prepped and draped in standard surgical fashion.  Time Out was performed with identification of the patient, side of the procedure and procedures to be done.    The ear canal was examined under the microscope and irrigated with normal saline. The TM is noted to be intact with the  cartilage graft in good position. No significant retractions noted. There is an anterior ear canal overhang making visualization of the entire anterior TM difficult. Myringotomy incision was made in the anterior inferior quadrant and a t-tube placed.    Postauricular incision was made down to the temporalis muscle.  Standard T-incision was made.  The soft tissues were elevated off the mastoid cavity to the posterior ear canal. There is significant neocortical overgrowth over the sigmoid sinus and partially over the epitympanum. This was drilled away and the sigmoid was identified and the epitympanum opened laterally. There was noted to be scar and soft tissue within the mastoid. This was taken off the walls and floor. The lateral tissue was removed and passed off for specimen. The operating microscope was brought into position and used for the remainder of the case.  The antrum was identified and also noted to be filled with soft tissue. This was freed off the walls and floor. The tissue was taken off the floor of the middle ear. The facial recess was identified and filled with tissue and scar which was freed. This allowed the stapes to be visualized. The tissue was freed from the undersurface of the cartilage graft which allowed it to be removed. The stapes suprastructure is intact and mobile. The anterior middle ear was explored with an annulus and some adhesions were lysed and scar taken out. The flanges of the t-tube were visualized. Small amount of gelfoam was placed around the suprastructure. A 2mm titanium PORP was placed over the stapes with good contact with the cartilage graft. Palpation of the TM through the ear canal resulted in motion of the PORP and stapes. The T incision was closed and the skin closed in multiple layers.    The patient tolerated the procedure well and all counts were correct.  The facial nerve electrodes were removed and a Farzana dressing placed.  The patient was then returned to  anesthesia, awakened, extubated and taken to the PACU in stable condition.    IRuchi MD, was scrubbed during the entire procedure.

## 2024-01-03 ENCOUNTER — OFFICE VISIT (OUTPATIENT)
Dept: OTOLARYNGOLOGY | Facility: CLINIC | Age: 59
End: 2024-01-03
Payer: COMMERCIAL

## 2024-01-03 VITALS
OXYGEN SATURATION: 99 % | SYSTOLIC BLOOD PRESSURE: 119 MMHG | DIASTOLIC BLOOD PRESSURE: 83 MMHG | WEIGHT: 112 LBS | TEMPERATURE: 97.7 F | BODY MASS INDEX: 19.84 KG/M2 | HEIGHT: 63 IN | HEART RATE: 72 BPM

## 2024-01-03 DIAGNOSIS — H90.A32 MIXED CONDUCTIVE AND SENSORINEURAL HEARING LOSS OF LEFT EAR WITH RESTRICTED HEARING OF RIGHT EAR: ICD-10-CM

## 2024-01-03 DIAGNOSIS — H69.92 DYSFUNCTION OF LEFT EUSTACHIAN TUBE: Primary | ICD-10-CM

## 2024-01-03 PROCEDURE — 99024 POSTOP FOLLOW-UP VISIT: CPT | Performed by: OTOLARYNGOLOGY

## 2024-01-03 ASSESSMENT — PAIN SCALES - GENERAL: PAINLEVEL: NO PAIN (0)

## 2024-01-03 NOTE — LETTER
1/3/2024      RE: Perri Scherer  [address] [facility] [place] [address]       Perri Scherer is a 57 year old female with a history of left cholesteatoma s/p tympanomastoidectomy with cartilage 6/14/2023. She is s/p LEFT revision tympanomastoidectomy, with facial nerve monitoring, ossicular chain reconstruction 12/13/23. She is here for her first postoperative visit.       Today, she reports she has intermittent popping in her left ear and an occasional sharp pain since her surgery. She doesn't notice a change in her hearing just yet although she says it seems less muffled than last time. She has had no acute otorrhea to report.      Physical examination:  female in no acute distress.  Alert and answering questions appropriately.  HB 1/6 bilaterally.  Left ear examined under the microscope. Left: no packing seen in the ear canal. Removed remaining sticker and glue from the postauricular incision which is healing well, minimal dried blood removed from the ear canal, t-tube intact and in position in anterior inferior quadrant, the PORP head is visible under the TM inferior to the cartilage graft, TM intact over the porp with no evidence of intrusion, cartilage graft is placed superior posterior to cover the scutal defect so it does not extend entirely over the mesotympanum, slightly retracted TM underneath edge of cartilage without squamous debris, remainder of the middle ear aerated.    Assessment and plan:    Perri Scherer is a 57 year old female with a history of left cholesteatoma s/p tympanomastoidectomy with cartilage on 6/14/2023. She is s/p LEFT revision tympanomastoidectomy, with facial nerve monitoring, ossicular chain reconstruction 12/13/23. She is here for her first postoperative visit. Discussed with patient about continuous observation of the location of her prosthesis as there is a risk of extrusion since it is not under the cartilage graft. Instructed patient she can discontinue use of her eardrops  and shower without wet ear precautions at this time. She had a question about removing the remaining glue from her surgery, and informed patient she can use alcohol or try to carefully pick at the glue, as remnants are seen in her hair. Return to care next month with an audiogram.    Scribe Disclosure:   I, Leonor Qiu, am serving as a scribe; to document services personally performed by Ruchi Rausch MD -based on data collection and the provider's statements to me.     Provider Disclosure:  I agree with above History, Review of Systems, Physical exam and Plan.  I have reviewed the content of the documentation and have edited it as needed. I have personally performed the services documented here and the documentation accurately represents those services and the decisions I have made.      Ruchi Rausch MD

## 2024-01-03 NOTE — NURSING NOTE
"Chief Complaint   Patient presents with    RECHECK     Post op      Blood pressure 119/83, pulse 72, temperature 97.7  F (36.5  C), height 1.6 m (5' 3\"), weight 50.8 kg (112 lb), SpO2 99%.  Rex Nur LPN    "

## 2024-01-03 NOTE — PATIENT INSTRUCTIONS
You were seen in the ENT Clinic today by Dr. Rausch. If you have any questions or concerns after your appointment, please contact us (see below)    The following has been recommended for you based upon your appointment today:      Please return to clinic as scheduled for post ops    How to Contact Us:  Send a [employer] message to your provider. Our team will respond to you via [employer]. Occasionally, we will need to call you to get further information.  For urgent matters (Monday-Friday), call the ENT Clinic: [phone] and speak with a call center team member - they will route your call appropriately.   If you'd like to speak directly with a nurse, please find our contact information below. We do our best to check voicemail frequently throughout the day, and will work to call you back within 1-2 days. For urgent matters, please use the general clinic phone numbers listed above.    Hina ADAMS, RN, BSN  RN Care Coordinator, ENT Clinic  [facility]  Direct: [phone]  Jacqueline DILLARD LPN  Direct: [phone]

## 2024-01-03 NOTE — PROGRESS NOTES
Perri Scherer is a 57 year old female with a history of left cholesteatoma s/p tympanomastoidectomy with cartilage 6/14/2023. She is s/p LEFT revision tympanomastoidectomy, with facial nerve monitoring, ossicular chain reconstruction 12/13/23. She is here for her first postoperative visit.       Today, she reports she has intermittent popping in her left ear and an occasional sharp pain since her surgery. She doesn't notice a change in her hearing just yet although she says it seems less muffled than last time. She has had no acute otorrhea to report.      Physical examination:  female in no acute distress.  Alert and answering questions appropriately.  HB 1/6 bilaterally.  Left ear examined under the microscope. Left: no packing seen in the ear canal. Removed remaining sticker and glue from the postauricular incision which is healing well, minimal dried blood removed from the ear canal, t-tube intact and in position in anterior inferior quadrant, the PORP head is visible under the TM inferior to the cartilage graft, TM intact over the porp with no evidence of intrusion, cartilage graft is placed superior posterior to cover the scutal defect so it does not extend entirely over the mesotympanum, slightly retracted TM underneath edge of cartilage without squamous debris, remainder of the middle ear aerated.    Assessment and plan:    Perri Scherer is a 57 year old female with a history of left cholesteatoma s/p tympanomastoidectomy with cartilage on 6/14/2023. She is s/p LEFT revision tympanomastoidectomy, with facial nerve monitoring, ossicular chain reconstruction 12/13/23. She is here for her first postoperative visit. Discussed with patient about continuous observation of the location of her prosthesis as there is a risk of extrusion since it is not under the cartilage graft. Instructed patient she can discontinue use of her eardrops and shower without wet ear precautions at this time. She had a question about  removing the remaining glue from her surgery, and informed patient she can use alcohol or try to carefully pick at the glue, as remnants are seen in her hair. Return to care next month with an audiogram.    Scribe Disclosure:   I, Leonor Qiu, am serving as a scribe; to document services personally performed by Ruchi Rausch MD -based on data collection and the provider's statements to me.     Provider Disclosure:  I agree with above History, Review of Systems, Physical exam and Plan.  I have reviewed the content of the documentation and have edited it as needed. I have personally performed the services documented here and the documentation accurately represents those services and the decisions I have made.

## 2024-02-15 DIAGNOSIS — H90.A32 MIXED CONDUCTIVE AND SENSORINEURAL HEARING LOSS OF LEFT EAR WITH RESTRICTED HEARING OF RIGHT EAR: Primary | ICD-10-CM

## 2024-02-21 ENCOUNTER — OFFICE VISIT (OUTPATIENT)
Dept: AUDIOLOGY | Facility: CLINIC | Age: 59
End: 2024-02-21
Attending: OTOLARYNGOLOGY
Payer: COMMERCIAL

## 2024-02-21 ENCOUNTER — OFFICE VISIT (OUTPATIENT)
Dept: OTOLARYNGOLOGY | Facility: CLINIC | Age: 59
End: 2024-02-21
Payer: COMMERCIAL

## 2024-02-21 VITALS
DIASTOLIC BLOOD PRESSURE: 74 MMHG | SYSTOLIC BLOOD PRESSURE: 114 MMHG | TEMPERATURE: 97.8 F | OXYGEN SATURATION: 100 % | HEART RATE: 65 BPM | HEIGHT: 63 IN | BODY MASS INDEX: 20.2 KG/M2 | WEIGHT: 114 LBS

## 2024-02-21 DIAGNOSIS — H90.A32 MIXED CONDUCTIVE AND SENSORINEURAL HEARING LOSS OF LEFT EAR WITH RESTRICTED HEARING OF RIGHT EAR: ICD-10-CM

## 2024-02-21 DIAGNOSIS — H92.02 LEFT EAR PAIN: Primary | ICD-10-CM

## 2024-02-21 DIAGNOSIS — H69.92 DYSFUNCTION OF LEFT EUSTACHIAN TUBE: Primary | ICD-10-CM

## 2024-02-21 PROCEDURE — 92550 TYMPANOMETRY & REFLEX THRESH: CPT | Mod: 52 | Performed by: AUDIOLOGIST

## 2024-02-21 PROCEDURE — 92557 COMPREHENSIVE HEARING TEST: CPT | Performed by: AUDIOLOGIST

## 2024-02-21 PROCEDURE — 99024 POSTOP FOLLOW-UP VISIT: CPT | Performed by: OTOLARYNGOLOGY

## 2024-02-21 RX ORDER — OFLOXACIN 3 MG/ML
5 SOLUTION AURICULAR (OTIC) 2 TIMES DAILY
Qty: 10 ML | Refills: 0 | Status: SHIPPED | OUTPATIENT
Start: 2024-02-21 | End: 2024-02-26

## 2024-02-21 ASSESSMENT — PAIN SCALES - GENERAL: PAINLEVEL: NO PAIN (0)

## 2024-02-21 NOTE — LETTER
2/21/2024      RE: Perri Scherer  [address] [facility] [address]       Perri Scherer is a 57 year old female with a history of left cholesteatoma s/p tympanomastoidectomy with cartilage 6/14/2023. She is s/p LEFT revision tympanomastoidectomy, with facial nerve monitoring, ossicular chain reconstruction 12/13/23. She is here for her second postoperative visit. At her last visit on 1/3/24, she reported she had intermittent popping in her left ear and an occasional sharp pain since her surgery. She didn't notice a change in her hearing just yet although she said it seemed less muffled than last time. She had no acute otorrhea to report.      Today, she reports she can't tell if her hearing has gotten better as her left ear feels really plugged. She has been having significant cerumen build-up. Last weekend, she experienced sharp otalgia in both of her ears so she used eardrops to help alleviate the pain. She denies otorrhea at this time.     Physical examination:  female in no acute distress.  Alert and answering questions appropriately.  HB 1/6 bilaterally.  Left ear examined under the microscope. There was dried blood in the ear canal and her T-tube is in position in the inferior quadrant and open, the superior edge of the prothesis is abutting the TM just inferior to the cartilage graft, the cartilage graft has rotated to the posterior superior quadrant and is not over the prothesis, a retraction pocket was seen superiorly under the cartilage graft and without debris.     Audiogram:  Audiogram on 2/21/24 was independently reviewed and compared to her prior test. RIGHT: Normal through 2kHz sloping to moderately severe SNHL, 15d air/bone gap noted 250Hz (re 8/10/23:stable). LEFT: Mild to normal sloping to profound mixed hearing loss (re 8/10/23: 5-40dB improvement across most frequencies). TYMPS: Negative pressure in RE, DNT Surgical LE. WORD REC: 100% at 55dBHL in RE  (stable), 92% at 60dBHL in  MAYLIN(stable).          Assessment and plan:  Perri Scherer is a 57 year old female with a history of left cholesteatoma s/p tympanomastoidectomy with cartilage 6/14/2023 and s/p LEFT revision tympanomastoidectomy, with facial nerve monitoring, ossicular chain reconstruction 12/13/23. She is here for her second postoperative visit. She has developed another fairly deep posterior superior retraction pocket despite having an open t-tube and her cartilage graft has retracted superiorly so her PORP is no longer covered by cartilage. Her hearing has improved though. We discussed that she does still have eustachian tube dysfunction and is still healing which may explain the fullness. Refilled her Floxin eardrops and instructed patient to place eardrops in her left ear 5 days before and after her next visit. Return to clinic in 4 months.      Scribe Disclosure:   I, Leonor Qiu, am serving as a scribe; to document services personally performed by Ruchi Rausch MD -based on data collection and the provider's statements to me.     Provider Disclosure:  I agree with above History, Review of Systems, Physical exam and Plan.  I have reviewed the content of the documentation and have edited it as needed. I have personally performed the services documented here and the documentation accurately represents those services and the decisions I have made.      Ruchi Rausch MD

## 2024-02-21 NOTE — NURSING NOTE
"Chief Complaint   Patient presents with    RECHECK     Post op      Blood pressure 114/74, pulse 65, temperature 97.8  F (36.6  C), height 1.6 m (5' 3\"), weight 51.7 kg (114 lb), SpO2 100%.  Rex Nur LPN    "

## 2024-02-21 NOTE — PROGRESS NOTES
AUDIOLOGY REPORT    SUMMARY: Audiology visit completed. See audiogram for results.      RECOMMENDATIONS: Follow-up with ENT.    Chhaya Plummer. CCC-A  Licensed Audiologist   [place] #[id number]

## 2024-02-21 NOTE — PATIENT INSTRUCTIONS
You were seen in the ENT Clinic today by Dr. Rausch. If you have any questions or concerns after your appointment, please contact us (see below)      2.   Please return to clinic in 4 months.         How to Contact Us:  Send a [employer] message to your provider. Our team will respond to you via [employer]. Occasionally, we will need to call you to get further information.  For urgent matters (Monday-Friday), call the ENT Clinic: [phone] and speak with a call center team member - they will route your call appropriately.   If you'd like to speak directly with a nurse, please find our contact information below. We do our best to check voicemail frequently throughout the day, and will work to call you back within 1-2 days. For urgent matters, please use the general clinic phone numbers listed above.      Hina ADAMS RN  ENT RN Care Coordinator  Direct: [phone]    Jacqueline MONTAGUE LPN  Direct: [phone]

## 2024-02-21 NOTE — PROGRESS NOTES
Perri Scherer is a 57 year old female with a history of left cholesteatoma s/p tympanomastoidectomy with cartilage 6/14/2023. She is s/p LEFT revision tympanomastoidectomy, with facial nerve monitoring, ossicular chain reconstruction 12/13/23. She is here for her second postoperative visit. At her last visit on 1/3/24, she reported she had intermittent popping in her left ear and an occasional sharp pain since her surgery. She didn't notice a change in her hearing just yet although she said it seemed less muffled than last time. She had no acute otorrhea to report.      Today, she reports she can't tell if her hearing has gotten better as her left ear feels really plugged. She has been having significant cerumen build-up. Last weekend, she experienced sharp otalgia in both of her ears so she used eardrops to help alleviate the pain. She denies otorrhea at this time.     Physical examination:  female in no acute distress.  Alert and answering questions appropriately.  HB 1/6 bilaterally.  Left ear examined under the microscope. There was dried blood in the ear canal and her T-tube is in position in the inferior quadrant and open, the superior edge of the prothesis is abutting the TM just inferior to the cartilage graft, the cartilage graft has rotated to the posterior superior quadrant and is not over the prothesis, a retraction pocket was seen superiorly under the cartilage graft and without debris.     Audiogram:  Audiogram on 2/21/24 was independently reviewed and compared to her prior test. RIGHT: Normal through 2kHz sloping to moderately severe SNHL, 15d air/bone gap noted 250Hz (re 8/10/23:stable). LEFT: Mild to normal sloping to profound mixed hearing loss (re 8/10/23: 5-40dB improvement across most frequencies). TYMPS: Negative pressure in RE, DNT Surgical LE. WORD REC: 100% at 55dBHL in RE  (stable), 92% at 60dBHL in LE(stable).          Assessment and plan:  Perri Scherer is a 57 year old female with a  history of left cholesteatoma s/p tympanomastoidectomy with cartilage 6/14/2023 and s/p LEFT revision tympanomastoidectomy, with facial nerve monitoring, ossicular chain reconstruction 12/13/23. She is here for her second postoperative visit. She has developed another fairly deep posterior superior retraction pocket despite having an open t-tube and her cartilage graft has retracted superiorly so her PORP is no longer covered by cartilage. Her hearing has improved though. We discussed that she does still have eustachian tube dysfunction and is still healing which may explain the fullness. Refilled her Floxin eardrops and instructed patient to place eardrops in her left ear 5 days before and after her next visit. Return to clinic in 4 months.      Scribe Disclosure:   I, Leonor Qiu, am serving as a scribe; to document services personally performed by Ruchi Rausch MD -based on data collection and the provider's statements to me.     Provider Disclosure:  I agree with above History, Review of Systems, Physical exam and Plan.  I have reviewed the content of the documentation and have edited it as needed. I have personally performed the services documented here and the documentation accurately represents those services and the decisions I have made.

## 2024-03-19 ENCOUNTER — TELEPHONE (OUTPATIENT)
Dept: FAMILY MEDICINE | Facility: CLINIC | Age: 59
End: 2024-03-19
Payer: COMMERCIAL

## 2024-03-19 NOTE — TELEPHONE ENCOUNTER
Patient Quality Outreach    Patient is due for the following:   Colon Cancer Screening  Breast Cancer Screening - Mammogram  Cervical Cancer Screening - PAP Needed    Next Steps:   Schedule a Adult Preventative    Type of outreach:    Chart review performed, no outreach needed.      Questions for provider review:    None           Jazmín Selby, [facility]

## 2024-04-16 NOTE — TELEPHONE ENCOUNTER
"Reason for Call:  Other TB questions    Detailed comments: pt calling wondering about the recent TB outbreak in [facility] and if she needs to come in for a TB test. I asked her if she goes to [facility] or has been exposed and she said \"no, that her  and son work in [facility] and her son is a appliance service repairman and goes into people's houses\".  She would like someone to call her please.    Phone Number Patient can be reached at: Work number on file:  [phone] (work)    Best Time: any    Can we leave a detailed message on this number? YES    Call taken on 11/15/2017 at 1:04 PM by Mayuri Hoff      "
Left message for patient to return call to clinic.    Nirmala Alcaraz RN    
Reviewed signs and symptoms of TB with patient.    Nirmala Alcaraz, RN    
Statement Selected

## 2024-04-26 NOTE — MR AVS SNAPSHOT
After Visit Summary   8/22/2018    Perri Scherer    MRN: [id number]           Patient Information     Date Of Birth          1965        Visit Information        Provider Department      8/22/2018 4:00 PM Provider, [facility]         Today's Diagnoses     Impetigo    -  1    Lesion of skin of scalp           Follow-ups after your visit        Additional Services     DERMATOLOGY REFERRAL       Your provider has referred you to: REBECA: [facility] [phone]   [email or web address]    Please be aware that coverage of these services is subject to the terms and limitations of your health insurance plan.  Call member services at your health plan with any benefit or coverage questions.      Please bring the following with you to your appointment:    (1) Any X-Rays, CTs or MRIs which have been performed.  Contact the facility where they were done to arrange for  prior to your scheduled appointment.    (2) List of current medications  (3) This referral request   (4) Any documents/labs given to you for this referral                  Who to contact     If you have questions or need follow up information about today's clinic visit or your schedule please contact [facility]  directly at [phone].  Normal or non-critical lab and imaging results will be communicated to you by MyChart, letter or phone within 4 business days after the clinic has received the results. If you do not hear from us within 7 days, please contact the clinic through MyChart or phone. If you have a critical or abnormal lab result, we will notify you by phone as soon as possible.  Submit refill requests through [employer] or call your pharmacy and they will forward the refill request to us. Please allow 3 business days for your refill to be completed.          Additional Information About Your Visit        Care EveryWhere  Preventive Care Advice   This is general advice given by our system to help you stay healthy. However, your care team may have specific advice just for you. Please talk to your care team about your preventive care needs.  Nutrition  Eat 5 or more servings of fruits and vegetables each day.  Try wheat bread, brown rice and whole grain pasta (instead of white bread, rice, and pasta).  Get enough calcium and vitamin D. Check the label on foods and aim for 100% of the RDA (recommended daily allowance).  Lifestyle  Exercise at least 150 minutes each week   (30 minutes a day, 5 days a week).  Do muscle strengthening activities 2 days a week. These help control your weight and prevent disease.  No smoking.  Wear sunscreen to prevent skin cancer.  Have a dental exam and cleaning every 6 months.  Yearly exams  See your health care team every year to talk about:  Any changes in your health.  Any medicines your care team has prescribed.  Preventive care, family planning, and ways to prevent chronic diseases.  Shots (vaccines)   HPV shots (up to age 26), if you've never had them before.  Hepatitis B shots (up to age 59), if you've never had them before.  COVID-19 shot: Get this shot when it's due.  Flu shot: Get a flu shot every year.  Tetanus shot: Get a tetanus shot every 10 years.  Pneumococcal, hepatitis A, and RSV shots: Ask your care team if you need these based on your risk.  Shingles shot (for age 50 and up).  General health tests  Diabetes screening:  Starting at age 35, Get screened for diabetes at least every 3 years.  If you are younger than age 35, ask your care team if you should be screened for diabetes.  Cholesterol test: At age 39, start having a cholesterol test every 5 years, or more often if advised.  Bone density scan (DEXA): At age 50, ask your care team if you should have this scan for osteoporosis (brittle bones).  Hepatitis C: Get tested at least once in your life.  STIs (sexually transmitted  "ID     This is your Care EveryWhere ID. This could be used by other organizations to access your [place] medical records  [id number]        Your Vitals Were     Pulse Temperature Height Pulse Oximetry BMI (Body Mass Index)       69 98.2  F (36.8  C) (Tympanic) 5' 3.5\" (1.613 m) 97% 23.89 kg/m2        Blood Pressure from Last 3 Encounters:   08/22/18 138/86   06/22/18 118/81   05/04/18 138/86    Weight from Last 3 Encounters:   08/22/18 137 lb (62.1 kg)   06/22/18 141 lb 1.6 oz (64 kg)   05/04/18 140 lb (63.5 kg)              We Performed the Following     DERMATOLOGY REFERRAL          Today's Medication Changes          These changes are accurate as of 8/22/18  4:20 PM.  If you have any questions, ask your nurse or doctor.               Start taking these medicines.        Dose/Directions    cephALEXin 500 MG capsule   Commonly known as:  KEFLEX   Used for:  Impetigo   Started by:  Lincoln Mckeon        Dose:  500 mg   Take 1 capsule (500 mg) by mouth 4 times daily for 7 days   Quantity:  28 capsule   Refills:  0       mupirocin 2 % ointment   Commonly known as:  BACTROBAN   Used for:  Impetigo   Started by:  Lincoln Mckeon        Apply topically 3 times daily for 5 days   Quantity:  22 g   Refills:  1            Where to get your medicines      Some of these will need a paper prescription and others can be bought over the counter.  Ask your nurse if you have questions.     Bring a paper prescription for each of these medications     cephALEXin 500 MG capsule    mupirocin 2 % ointment                Primary Care Provider Office Phone # Fax #    Kimmyazar Schroeder Brittani, APRN [facility] [phone] [address] [facility] [address]        Equal Access to Services     JEAN BECKMAN AH: Elif Bethea, wasilviada luqadaha, qaybta kaalmada adeegyada, cynthia weber. So [facility] [phone].    ATENCIÓN: Si habla español, tiene a david disposición servicios gratuitos de " infections)  Before age 24: Ask your care team if you should be screened for STIs.  After age 24: Get screened for STIs if you're at risk. You are at risk for STIs (including HIV) if:  You are sexually active with more than one person.  You don't use condoms every time.  You or a partner was diagnosed with a sexually transmitted infection.  If you are at risk for HIV, ask about PrEP medicine to prevent HIV.  Get tested for HIV at least once in your life, whether you are at risk for HIV or not.  Cancer screening tests  Cervical cancer screening: If you have a cervix, begin getting regular cervical cancer screening tests at age 21. Most people who have regular screenings with normal results can stop after age 65. Talk about this with your provider.  Breast cancer scan (mammogram): If you've ever had breasts, begin having regular mammograms starting at age 40. This is a scan to check for breast cancer.  Colon cancer screening: It is important to start screening for colon cancer at age 45.  Have a colonoscopy test every 10 years (or more often if you're at risk) Or, ask your provider about stool tests like a FIT test every year or Cologuard test every 3 years.  To learn more about your testing options, visit: https://[email or web address]/306909.pdf.  For help making a decision, visit: https://bit.ly/hf36273.  Prostate cancer screening test: If you have a prostate and are age 55 to 69, ask your provider if you would benefit from a yearly prostate cancer screening test.  Lung cancer screening: If you are a current or former smoker age 50 to 80, ask your care team if ongoing lung cancer screenings are right for you.  For informational purposes only. Not to replace the advice of your health care provider. Copyright   2023 [employer]. All rights reserved. Clinically reviewed by the [facility]. [employer] 477331 - REV 01/24.    Learning About Stress  What is stress?     Stress is your  asistencia lingüística. Tracy al [phone].    We comply with applicable federal civil rights laws and [place] laws. We do not discriminate on the basis of race, color, national origin, age, disability, sex, sexual orientation, or gender identity.            Thank you!     Thank you for choosing [place] [address]  for your care. Our goal is always to provide you with excellent care. Hearing back from our patients is one way we can continue to improve our services. Please take a few minutes to complete the written survey that you may receive in the mail after your visit with us. Thank you!             Your Updated Medication List - Protect others around you: Learn how to safely use, store and throw away your medicines at www.disposemymeds.org.          This list is accurate as of 8/22/18  4:20 PM.  Always use your most recent med list.                   Brand Name Dispense Instructions for use Diagnosis    butalbital-acetaminophen-caffeine -40 MG per tablet    FIORICET/ESGIC    15 tablet    Take 1 tablet by mouth every 6 hours as needed    Other migraine without status migrainosus, not intractable       cephALEXin 500 MG capsule    KEFLEX    28 capsule    Take 1 capsule (500 mg) by mouth 4 times daily for 7 days    Impetigo       levothyroxine 100 MCG tablet    SYNTHROID/LEVOTHROID    90 tablet    TAKE 1 TABLET(100 MCG) BY MOUTH DAILY    Hypothyroidism, unspecified type       lisinopril 20 MG tablet    PRINIVIL/ZESTRIL    90 tablet    Take 1 tablet (20 mg) by mouth daily    Benign essential hypertension       mupirocin 2 % ointment    BACTROBAN    22 g    Apply topically 3 times daily for 5 days    Impetigo       * SUMAtriptan 50 MG tablet    IMITREX    9 tablet    Take 1 tablet (50 mg) by mouth at onset of headache for migraine May repeat in 2 hours. Max 4 tablets/24 hours.        * SUMAtriptan 50 MG tablet    IMITREX    9 tablet    Take 1 tablet (50 mg) by mouth at onset of headache  body's response to a hard situation. Your body can have a physical, emotional, or mental response. Stress is a fact of life for most people, and it affects everyone differently. What causes stress for you may not be stressful for someone else.  A lot of things can cause stress. You may feel stress when you go on a job interview, take a test, or run a race. This kind of short-term stress is normal and even useful. It can help you if you need to work hard or react quickly. For example, stress can help you finish an important job on time.  Long-term stress is caused by ongoing stressful situations or events. Examples of long-term stress include long-term health problems, ongoing problems at work, or conflicts in your family. Long-term stress can harm your health.  How does stress affect your health?  When you are stressed, your body responds as though you are in danger. It makes hormones that speed up your heart, make you breathe faster, and give you a burst of energy. This is called the fight-or-flight stress response. If the stress is over quickly, your body goes back to normal and no harm is done.  But if stress happens too often or lasts too long, it can have bad effects. Long-term stress can make you more likely to get sick, and it can make symptoms of some diseases worse. If you tense up when you are stressed, you may develop neck, shoulder, or low back pain. Stress is linked to high blood pressure and heart disease.  Stress also harms your emotional health. It can make you echeverria, tense, or depressed. Your relationships may suffer, and you may not do well at work or school.  What can you do to manage stress?  You can try these things to help manage stress:   Do something active. Exercise or activity can help reduce stress. Walking is a great way to get started. Even everyday activities such as housecleaning or yard work can help.  Try yoga or nikhil chi. These techniques combine exercise and meditation. You may need  for migraine May repeat in 2 hours. Max 4 tablets/24 hours.        * Notice:  This list has 2 medication(s) that are the same as other medications prescribed for you. Read the directions carefully, and ask your doctor or other care provider to review them with you.       some training at first to learn them.  Do something you enjoy. For example, listen to music or go to a movie. Practice your hobby or do volunteer work.  Meditate. This can help you relax, because you are not worrying about what happened before or what may happen in the future.  Do guided imagery. Imagine yourself in any setting that helps you feel calm. You can use online videos, books, or a teacher to guide you.  Do breathing exercises. For example:  From a standing position, bend forward from the waist with your knees slightly bent. Let your arms dangle close to the floor.  Breathe in slowly and deeply as you return to a standing position. Roll up slowly and lift your head last.  Hold your breath for just a few seconds in the standing position.  Breathe out slowly and bend forward from the waist.  Let your feelings out. Talk, laugh, cry, and express anger when you need to. Talking with supportive friends or family, a counselor, or a jeffery leader about your feelings is a healthy way to relieve stress. Avoid discussing your feelings with people who make you feel worse.  Write. It may help to write about things that are bothering you. This helps you find out how much stress you feel and what is causing it. When you know this, you can find better ways to cope.  What can you do to prevent stress?  You might try some of these things to help prevent stress:  Manage your time. This helps you find time to do the things you want and need to do.  Get enough sleep. Your body recovers from the stresses of the day while you are sleeping.  Get support. Your family, friends, and community can make a difference in how you experience stress.  Limit your news feed. Avoid or limit time on social media or news that may make you feel stressed.  Do something active. Exercise or activity can help reduce stress. Walking is a great way to get started.  Where can you learn more?  Go to https://www.healthwise.net/patiented  Enter N032 in the  "search box to learn more about \"Learning About Stress.\"  Current as of: October 24, 2023               Content Version: 14.0    4975-3216 [employer].   Care instructions adapted under license by your healthcare professional. If you have questions about a medical condition or this instruction, always ask your healthcare professional. [employer] disclaims any warranty or liability for your use of this information.      "

## 2024-05-20 DIAGNOSIS — E03.9 HYPOTHYROIDISM, UNSPECIFIED TYPE: ICD-10-CM

## 2024-05-20 RX ORDER — LEVOTHYROXINE SODIUM 75 UG/1
75 TABLET ORAL DAILY
Qty: 90 TABLET | Refills: 0 | Status: SHIPPED | OUTPATIENT
Start: 2024-05-20 | End: 2024-06-07

## 2024-05-20 NOTE — TELEPHONE ENCOUNTER
Overdue for labs. Delia refill provided for 90 days.  Please schedule patient for annual visit or office visit to update lab work and for future refills.

## 2024-05-20 NOTE — TELEPHONE ENCOUNTER
Called and left message for pt to call back and schedule appt.    Joyce Soriano on 5/20/2024 at 11:42 AM

## 2024-05-20 NOTE — TELEPHONE ENCOUNTER
Requested Prescriptions   Pending Prescriptions Disp Refills    levothyroxine (SYNTHROID/LEVOTHROID) 75 MCG tablet [Pharmacy Med Name: levothyroxine 75 mcg tablet] 90 tablet 0     Sig: take one tablet EVERY DAY       Thyroid Protocol Failed - 5/20/2024  6:55 AM        Failed - Normal TSH on file in past 12 months     Recent Labs   Lab Test 03/23/23  1329   TSH 1.39              Passed - Patient is 12 years or older        Passed - Recent (12 mo) or future (30 days) visit within the authorizing provider's specialty     The patient must have completed an in-person or virtual visit within the past 12 months or has a future visit scheduled within the next 90 days with the authorizing provider s specialty.  Urgent care and e-visits do not quality as an office visit for this protocol.          Passed - Medication is active on med list        Passed - Medication indicated for associated diagnosis     Medication is associated with one or more of the following diagnoses:  Hypothyroidism  Thyroid stimulating hormone suppression therapy  Thyroid cancer          Passed - No active pregnancy on record     If patient is pregnant or has had a positive pregnancy test, please check TSH.          Passed - No positive pregnancy test in past 12 months     If patient is pregnant or has had a positive pregnancy test, please check TSH.

## 2024-06-07 ENCOUNTER — OFFICE VISIT (OUTPATIENT)
Dept: FAMILY MEDICINE | Facility: CLINIC | Age: 59
End: 2024-06-07
Payer: COMMERCIAL

## 2024-06-07 VITALS
OXYGEN SATURATION: 96 % | BODY MASS INDEX: 19.81 KG/M2 | HEART RATE: 56 BPM | TEMPERATURE: 98.2 F | HEIGHT: 64 IN | SYSTOLIC BLOOD PRESSURE: 106 MMHG | WEIGHT: 116 LBS | RESPIRATION RATE: 16 BRPM | DIASTOLIC BLOOD PRESSURE: 64 MMHG

## 2024-06-07 DIAGNOSIS — E03.9 HYPOTHYROIDISM, UNSPECIFIED TYPE: Primary | ICD-10-CM

## 2024-06-07 DIAGNOSIS — Z12.11 SCREEN FOR COLON CANCER: ICD-10-CM

## 2024-06-07 DIAGNOSIS — R05.9 COUGH, UNSPECIFIED TYPE: ICD-10-CM

## 2024-06-07 DIAGNOSIS — I10 BENIGN ESSENTIAL HYPERTENSION: ICD-10-CM

## 2024-06-07 DIAGNOSIS — E78.5 HYPERLIPIDEMIA LDL GOAL <160: ICD-10-CM

## 2024-06-07 LAB
ANION GAP SERPL CALCULATED.3IONS-SCNC: 9 MMOL/L (ref 7–15)
BUN SERPL-MCNC: 13.9 MG/DL (ref 6–20)
CALCIUM SERPL-MCNC: 9.4 MG/DL (ref 8.6–10)
CHLORIDE SERPL-SCNC: 104 MMOL/L (ref 98–107)
CHOLEST SERPL-MCNC: 224 MG/DL
CREAT SERPL-MCNC: 0.76 MG/DL (ref 0.51–0.95)
DEPRECATED HCO3 PLAS-SCNC: 24 MMOL/L (ref 22–29)
EGFRCR SERPLBLD CKD-EPI 2021: 90 ML/MIN/1.73M2
FASTING STATUS PATIENT QL REPORTED: YES
FASTING STATUS PATIENT QL REPORTED: YES
GLUCOSE SERPL-MCNC: 103 MG/DL (ref 70–99)
HDLC SERPL-MCNC: 75 MG/DL
LDLC SERPL CALC-MCNC: 139 MG/DL
NONHDLC SERPL-MCNC: 149 MG/DL
POTASSIUM SERPL-SCNC: 4.6 MMOL/L (ref 3.4–5.3)
SODIUM SERPL-SCNC: 137 MMOL/L (ref 135–145)
T4 FREE SERPL-MCNC: 1.36 NG/DL (ref 0.9–1.7)
TRIGL SERPL-MCNC: 49 MG/DL
TSH SERPL DL<=0.005 MIU/L-ACNC: 6.01 UIU/ML (ref 0.3–4.2)

## 2024-06-07 PROCEDURE — 80048 BASIC METABOLIC PNL TOTAL CA: CPT | Performed by: FAMILY MEDICINE

## 2024-06-07 PROCEDURE — 36415 COLL VENOUS BLD VENIPUNCTURE: CPT | Performed by: FAMILY MEDICINE

## 2024-06-07 PROCEDURE — 84443 ASSAY THYROID STIM HORMONE: CPT | Performed by: FAMILY MEDICINE

## 2024-06-07 PROCEDURE — 99214 OFFICE O/P EST MOD 30 MIN: CPT | Performed by: FAMILY MEDICINE

## 2024-06-07 PROCEDURE — 80061 LIPID PANEL: CPT | Performed by: FAMILY MEDICINE

## 2024-06-07 PROCEDURE — 84439 ASSAY OF FREE THYROXINE: CPT | Performed by: FAMILY MEDICINE

## 2024-06-07 RX ORDER — LEVOTHYROXINE SODIUM 75 UG/1
75 TABLET ORAL DAILY
Qty: 90 TABLET | Refills: 3 | Status: SHIPPED | OUTPATIENT
Start: 2024-06-07

## 2024-06-07 RX ORDER — LISINOPRIL 20 MG/1
20 TABLET ORAL DAILY
Qty: 90 TABLET | Refills: 3 | Status: SHIPPED | OUTPATIENT
Start: 2024-06-07

## 2024-06-07 RX ORDER — BENZONATATE 100 MG/1
100 CAPSULE ORAL 3 TIMES DAILY PRN
Qty: 42 CAPSULE | Refills: 0 | Status: SHIPPED | OUTPATIENT
Start: 2024-06-07

## 2024-06-07 ASSESSMENT — PAIN SCALES - GENERAL: PAINLEVEL: NO PAIN (0)

## 2024-06-07 ASSESSMENT — ENCOUNTER SYMPTOMS: COUGH: 1

## 2024-06-07 NOTE — PROGRESS NOTES
Assessment & Plan     (E03.9) Hypothyroidism, unspecified type  (primary encounter diagnosis)  Comment: Labs ordered. Medication faxed.  Plan: TSH WITH FREE T4 REFLEX, levothyroxine         (SYNTHROID/LEVOTHROID) 75 MCG tablet, T4 free            (Z12.11) Screen for colon cancer  Comment: FIT test given to patient. Patient will be notified of results.   Plan: Fecal colorectal cancer screen (FIT)            (E78.5) Hyperlipidemia LDL goal <160  Comment: Lipid labs ordered, patient will be notified of results.   Plan: Lipid panel reflex to direct LDL Non-fasting            (I10) Benign essential hypertension  Comment: Blood pressure was okay today.  Plan: lisinopril (ZESTRIL) 20 MG tablet, Basic         metabolic panel  (Ca, Cl, CO2, Creat, Gluc, K,         Na, BUN)            (R05.9) Cough, unspecified type  Comment: Patient asked to try the cough medication, if no improvement can call for antibiotics.   Plan: benzonatate (TESSALON) 100 MG capsule                    FUTURE APPOINTMENTS:       - Follow-up visit as needed    Allie Rayo is a 58 year old, presenting for the following health issues:    Patient is a 58 yr old female here for recheck on thyroid and blood pressure medications.   She has been on these medications for a number of years. She has had no issues with taking her medications. She is requesting refills and also labs work.   She also mentioned she has had a cough for upwards of four weeks. She says it is mostly a dry cough. She feels like the phlegm is stuck in her chest. She reports no fevers or chills. She reports some mild shortness of breath. She says it made her stop smoking.       Thyroid Problem (Recheck on thyroid medication. ), Hypertension (Recheck on blood pressure.), Cough (Cough x one month.  Started off as a cold that she thought was allergy related.  This is getting better but when taking a deep breath it is not as easy as it should be.  It did help for her to quit smoking.),  Blood Draw (She has been drinking coffee today.), Imm/Inj (Declined Covid injection.  She will get her second Shingles vaccine at her Pharmacy.), and Health Maintenance (Reminded her she is due for a pap and physical in the near future.)        6/7/2024     6:49 AM   Additional Questions   Roomed by Coleen Marie CMA     Cough    History of Present Illness       Reason for visit:  Refill meds    She eats 4 or more servings of fruits and vegetables daily.She consumes 0 sweetened beverage(s) daily.She exercises with enough effort to increase her heart rate 60 or more minutes per day.  She exercises with enough effort to increase her heart rate 5 days per week.   She is taking medications regularly.     Chief Complaint   Patient presents with    Thyroid Problem     Recheck on thyroid medication.     Hypertension     Recheck on blood pressure.    Cough     Cough x one month.  Started off as a cold that she thought was allergy related.  This is getting better but when taking a deep breath it is not as easy as it should be.  It did help for her to quit smoking.    Blood Draw     She has been drinking coffee today.    Imm/Inj     Declined Covid injection.  She will get her second Shingles vaccine at her Pharmacy.    Health Maintenance     Reminded her she is due for a pap and physical in the near future.       Hypertension Follow-up    Do you check your blood pressure regularly outside of the clinic? No   Are you following a low salt diet? No  Are your blood pressures ever more than 140 on the top number (systolic) OR more   than 90 on the bottom number (diastolic), for example 140/90? Not checking.    Hypothyroidism Follow-up    Since last visit, patient describes the following symptoms: constipation, anxiety, depression, and fatigue    Acute Illness  Acute illness concerns: Cough that is still present.  Onset/Duration: 1 month.  Started out as a cold and thought she had allergies.  Cough is still  "present.  Symptoms:  Fever: No  Chills/Sweats: No  Headache (location?): No  Sinus Pressure: No  Conjunctivitis:  No  Ear Pain: no  Rhinorrhea: YES- states ever since she had her ear surgery she has nasal drainage.  Congestion: YES- in the chest.  Today it seems better but like she is unable to cough the phlegm out to get better.  Sore Throat: No  Cough: YES-Productive at times.  Unsure of color.  Wheeze: This is getting better.  She did quit smoking 3 weeks ago due to having issues with breathing.  Her  had her use his inhaler and that helped when using that one time.  Decreased Appetite: No  Nausea: No  Vomiting: No  Diarrhea: No  Dysuria/Freq.: No  Dysuria or Hematuria: No  Fatigue/Achiness: YES- Fatigue.  Sick/Strep Exposure: No  Therapies tried and outcome: Tried her husbands inhaler and that helped the one time using.            Review of Systems  CONSTITUTIONAL: NEGATIVE for fever, chills, change in weight  INTEGUMENTARY/SKIN: NEGATIVE for worrisome rashes, moles or lesions  EYES: NEGATIVE for vision changes or irritation  ENT/MOUTH: NEGATIVE for ear, mouth and throat problems  RESP:POSITIVE for cough-non productive  BREAST: NEGATIVE for masses, tenderness or discharge  CV: NEGATIVE for chest pain, palpitations or peripheral edema  GI: NEGATIVE for nausea, abdominal pain, heartburn, or change in bowel habits  : NEGATIVE for frequency, dysuria, or hematuria  MUSCULOSKELETAL: NEGATIVE for significant arthralgias or myalgia  NEURO: NEGATIVE for weakness, dizziness or paresthesias  ENDOCRINE: NEGATIVE for temperature intolerance, skin/hair changes  HEME: NEGATIVE for bleeding problems  PSYCHIATRIC: NEGATIVE for changes in mood or affect      Objective    /64 (BP Location: Left arm, Patient Position: Chair, Cuff Size: Adult Regular)   Pulse 56   Temp 98.2  F (36.8  C) (Tympanic)   Resp 16   Ht 1.613 m (5' 3.5\")   Wt 52.6 kg (116 lb)   SpO2 96%   BMI 20.23 kg/m    Body mass index is 20.23 " kg/m .  Physical Exam   GENERAL: alert and no distress  EYES: Eyes grossly normal to inspection, PERRL and conjunctivae and sclerae normal  HENT: ear canals and TM's normal, nose and mouth without ulcers or lesions  NECK: no adenopathy, no asymmetry, masses, or scars  RESP: lungs clear to auscultation - no rales, rhonchi or wheezes  CV: regular rate and rhythm, normal S1 S2, no S3 or S4, no murmur, click or rub, no peripheral edema  ABDOMEN: soft, nontender, no hepatosplenomegaly, no masses and bowel sounds normal  MS: no gross musculoskeletal defects noted, no edema  SKIN: no suspicious lesions or rashes  NEURO: Normal strength and tone, mentation intact and speech normal  PSYCH: mentation appears normal, affect normal/bright    Results for orders placed or performed in visit on 06/07/24 (from the past 24 hour(s))   Lipid panel reflex to direct LDL Non-fasting   Result Value Ref Range    Cholesterol 224 (H) <200 mg/dL    Triglycerides 49 <150 mg/dL    Direct Measure HDL 75 >=50 mg/dL    LDL Cholesterol Calculated 139 (H) <=100 mg/dL    Non HDL Cholesterol 149 (H) <130 mg/dL    Patient Fasting > 8hrs? Yes     Narrative    Cholesterol  Desirable:  <200 mg/dL    Triglycerides  Normal:  Less than 150 mg/dL  Borderline High:  150-199 mg/dL  High:  200-499 mg/dL  Very High:  Greater than or equal to 500 mg/dL    Direct Measure HDL  Female:  Greater than or equal to 50 mg/dL   Male:  Greater than or equal to 40 mg/dL    LDL Cholesterol  Desirable:  <100mg/dL  Above Desirable:  100-129 mg/dL   Borderline High:  130-159 mg/dL   High:  160-189 mg/dL   Very High:  >= 190 mg/dL    Non HDL Cholesterol  Desirable:  130 mg/dL  Above Desirable:  130-159 mg/dL  Borderline High:  160-189 mg/dL  High:  190-219 mg/dL  Very High:  Greater than or equal to 220 mg/dL   TSH WITH FREE T4 REFLEX   Result Value Ref Range    TSH 6.01 (H) 0.30 - 4.20 uIU/mL   Basic metabolic panel  (Ca, Cl, CO2, Creat, Gluc, K, Na, BUN)   Result Value Ref  Range    Sodium 137 135 - 145 mmol/L    Potassium 4.6 3.4 - 5.3 mmol/L    Chloride 104 98 - 107 mmol/L    Carbon Dioxide (CO2) 24 22 - 29 mmol/L    Anion Gap 9 7 - 15 mmol/L    Urea Nitrogen 13.9 6.0 - 20.0 mg/dL    Creatinine 0.76 0.51 - 0.95 mg/dL    GFR Estimate 90 >60 mL/min/1.73m2    Calcium 9.4 8.6 - 10.0 mg/dL    Glucose 103 (H) 70 - 99 mg/dL    Patient Fasting > 8hrs? Yes            Signed Electronically by: Nataliia Ramires MD     Quality 110: Preventive Care And Screening: Influenza Immunization: Influenza immunization was not ordered or administered, reason not given Detail Level: Zone

## 2024-06-07 NOTE — RESULT ENCOUNTER NOTE
Please inform patient that test result showed elevation of cholesterol labs.   Recommend lifestyle changes. Her glucose was also slightly elevated. Recommend lifestyle changes, diet and exercise.    Thank you.     Nataliia Ramires M.D.

## 2024-06-07 NOTE — LETTER
June 7, 2024      Perri Scherer  [address] [facility] [address]        Dear ,    We are writing to inform you of your test results.    Test result showed elevation of cholesterol labs.   Recommend lifestyle changes. Your glucose was also slightly elevated. Recommend lifestyle changes, diet and exercise.       Resulted Orders   Lipid panel reflex to direct LDL Non-fasting   Result Value Ref Range    Cholesterol 224 (H) <200 mg/dL    Triglycerides 49 <150 mg/dL    Direct Measure HDL 75 >=50 mg/dL    LDL Cholesterol Calculated 139 (H) <=100 mg/dL    Non HDL Cholesterol 149 (H) <130 mg/dL    Patient Fasting > 8hrs? Yes     Narrative    Cholesterol  Desirable:  <200 mg/dL    Triglycerides  Normal:  Less than 150 mg/dL  Borderline High:  150-199 mg/dL  High:  200-499 mg/dL  Very High:  Greater than or equal to 500 mg/dL    Direct Measure HDL  Female:  Greater than or equal to 50 mg/dL   Male:  Greater than or equal to 40 mg/dL    LDL Cholesterol  Desirable:  <100mg/dL  Above Desirable:  100-129 mg/dL   Borderline High:  130-159 mg/dL   High:  160-189 mg/dL   Very High:  >= 190 mg/dL    Non HDL Cholesterol  Desirable:  130 mg/dL  Above Desirable:  130-159 mg/dL  Borderline High:  160-189 mg/dL  High:  190-219 mg/dL  Very High:  Greater than or equal to 220 mg/dL   Basic metabolic panel  (Ca, Cl, CO2, Creat, Gluc, K, Na, BUN)   Result Value Ref Range    Sodium 137 135 - 145 mmol/L      Comment:      Reference intervals for this test were updated on 09/26/2023 to more accurately reflect our healthy population. There may be differences in the flagging of prior results with similar values performed with this method. Interpretation of those prior results can be made in the context of the updated reference intervals.     Potassium 4.6 3.4 - 5.3 mmol/L    Chloride 104 98 - 107 mmol/L    Carbon Dioxide (CO2) 24 22 - 29 mmol/L    Anion Gap 9 7 - 15 mmol/L    Urea Nitrogen 13.9 6.0 - 20.0 mg/dL    Creatinine 0.76  0.51 - 0.95 mg/dL    GFR Estimate 90 >60 mL/min/1.73m2    Calcium 9.4 8.6 - 10.0 mg/dL    Glucose 103 (H) 70 - 99 mg/dL    Patient Fasting > 8hrs? Yes        If you have any questions or concerns, please call the clinic at the number listed above.       Sincerely,      Nataliia Ramires MD

## 2024-06-09 NOTE — RESULT ENCOUNTER NOTE
Shruti Rayo  Your lab results showed elevated thyroid but the T4 was within normal limits.  I will recommend recheck in about a month if the TSH continues to go up we will may need to adjust your levothyroxine dose.  Cholesterol was about the same as the last check.  Your blood glucose also was slightly elevated.  I recommend working on diet and exercise to improve this.  Will recheck the cholesterol and blood glucose next year.  Will recheck the thyroid in about a month.  Thank you.     Nataliia Ramires M.D.

## 2024-06-20 NOTE — PROGRESS NOTES
Perri Scherer is a 57 year old female with a history of left cholesteatoma s/p tympanomastoidectomy with cartilage 6/14/2023. She is s/p LEFT revision tympanomastoidectomy, with facial nerve monitoring, ossicular chain reconstruction 12/13/23. She's here with her . At her last visit, the t-tube was in position but she was noted to have exposure of the prosthesis as the cartilage had migrated laterally and superiorly onto the ear canal.    Today, she reports she has not had otorrhea nor otalgia. She states she still has a numbness sensation on her right ear. Her hearing has remained stable since last visit. She did use drops on the left for a few days prior to this visit. Perri has no additional concerns at this time.     Physical examination:  female in no acute distress.  Alert and answering questions appropriately.  HB 1/6 bilaterally.  Bilateral ears examined under the microscope.  Left ear: Clear ear canal, TM intact, cartilage remains over the posterior superior quadrant and onto the ear canal, prosthesis not covered by the cartilage with the TM resting on the top but no extrusion noted, retraction superiorly is stable, middle ear appears aerated.   Right ear: Clear ear canal and intact TM after mild cerumen was removed with an alligator.     Procedure:  Given the retraction superiorly on the left and the shifting of the cartilage off the top of the prosthesis, discussion was had regarding t-tube placement to help prevent worsening of the retraction and extrusion of the PORP. The risks and benefits were discussed.  Risks include but are not limited to:  Drainage, tympanic membrane perforation requiring future repair and need for further tube placement. She consented. Time Out was performed with confirmation of the patient, site and procedure. Left ear examined under the microscope. Phenol was placed on the superior quadrant. Myringotomy incision was made. Middle ear clear. T-tube placed. She tolerated  the procedure well.    Assessment and plan:  Perri is a 57 yr old female with history of left cholesteatoma s/p tympanomastoidectomy with cartilage 6/14/2023 and s/p LEFT revision tympanomastoidectomy, with facial nerve monitoring, ossicular chain reconstruction 12/13/23. She is here for third post op visit. New t-tube placed today. We refilled her Floxin eardrops and instructed patient to place eardrops in her left ear 5 days before and after her next visit.    Follow up in 6 months     The longitudinal plan of care for the diagnosis(es)/condition(s) as documented were addressed during this visit. Due to the added complexity in care, I will continue to support Perri in the subsequent management and with ongoing continuity of care.  I, Ruchi Rausch MD, was present with the medical student who participated in the service and in the documentation of the note.  I have verified the history and personally performed the physical exam and medical decision making.  I agree with the assessment and plan of care as documented in the note.     Scribe Disclosure:   I, Yuliana Martínez, am serving as a scribe; to document services personally performed by Ruchi Rausch MD -based on data collection and the provider's statements to me.     Provider Disclosure:  I agree with above History, Review of Systems, Physical exam and Plan.  I have reviewed the content of the documentation and have edited it as needed. I have personally performed the services documented here and the documentation accurately represents those services and the decisions I have made.      Gretel Valencia MS4

## 2024-06-24 ENCOUNTER — OFFICE VISIT (OUTPATIENT)
Dept: OTOLARYNGOLOGY | Facility: CLINIC | Age: 59
End: 2024-06-24
Payer: COMMERCIAL

## 2024-06-24 VITALS
OXYGEN SATURATION: 100 % | SYSTOLIC BLOOD PRESSURE: 110 MMHG | DIASTOLIC BLOOD PRESSURE: 71 MMHG | TEMPERATURE: 98 F | WEIGHT: 116 LBS | HEIGHT: 63 IN | BODY MASS INDEX: 20.55 KG/M2

## 2024-06-24 DIAGNOSIS — H69.92 DYSFUNCTION OF LEFT EUSTACHIAN TUBE: Primary | ICD-10-CM

## 2024-06-24 DIAGNOSIS — H90.A32 MIXED CONDUCTIVE AND SENSORINEURAL HEARING LOSS OF LEFT EAR WITH RESTRICTED HEARING OF RIGHT EAR: ICD-10-CM

## 2024-06-24 PROCEDURE — 99212 OFFICE O/P EST SF 10 MIN: CPT | Mod: 25 | Performed by: OTOLARYNGOLOGY

## 2024-06-24 PROCEDURE — 69433 CREATE EARDRUM OPENING: CPT | Mod: LT | Performed by: OTOLARYNGOLOGY

## 2024-06-24 ASSESSMENT — PAIN SCALES - GENERAL: PAINLEVEL: NO PAIN (0)

## 2024-06-24 NOTE — NURSING NOTE
"Chief Complaint   Patient presents with    RECHECK     4 month follow up     Blood pressure 110/71, temperature 98  F (36.7  C), height 1.6 m (5' 3\"), weight 52.6 kg (116 lb), SpO2 100%.  Rex Nur LPN    "

## 2024-06-24 NOTE — Clinical Note
6/24/2024       RE: Perri Scherer  [address]  [facility] [address]     Dear Colleague,    Thank you for referring your patient, Perri Scherer, to the [facility] [place] [facility]. Please see a copy of my visit note below.    Perri Scherer is a 57 year old female with a history of left cholesteatoma s/p tympanomastoidectomy with cartilage 6/14/2023. She is s/p LEFT revision tympanomastoidectomy, with facial nerve monitoring, ossicular chain reconstruction 12/13/23. On 1/3/24, she reported she had intermittent popping in her left ear and an occasional sharp pain since her surgery. She didn't notice a change in her hearing just yet although she said it seemed less muffled than last time. She had no acute otorrhea to report.  On 2/21/24, she reported she could not tell if her hearing had gotten better as her left ear felt really plugged. She had been having significant cerumen build-up. The last weekend before this appointment, she experienced sharp otalgia in both of her ears so she used eardrops to help alleviate the pain. She denies otorrhea at this time.     Today, she reports ***    Physical examination:  female in no acute distress.  Alert and answering questions appropriately.  HB 1/6 bilaterally.  {RIGHT, LEFT-INITIAL CAP:5607} ears examined under the microscope.    Audiogram:  Audiogram was independently reviewed. ***    Assessment and plan:  ***  *** previous A/P Perri Scherer is a 57 year old female with a history of left cholesteatoma s/p tympanomastoidectomy with cartilage 6/14/2023 and s/p LEFT revision tympanomastoidectomy, with facial nerve monitoring, ossicular chain reconstruction 12/13/23. She is here for her second postoperative visit. She has developed another fairly deep posterior superior retraction pocket despite having an open t-tube and her cartilage graft has retracted superiorly so her PORP is no  longer covered by cartilage. Her hearing has improved though. We discussed that she does still have eustachian tube dysfunction and is still healing which may explain the fullness. Refilled her Floxin eardrops and instructed patient to place eardrops in her left ear 5 days before and after her next visit.    Follow up in ***    Scribe Disclosure:   I, Yuliana Martínez, am serving as a scribe; to document services personally performed by Ruchi Rausch MD -based on data collection and the provider's statements to me.     Provider Disclosure:  I agree with above History, Review of Systems, Physical exam and Plan.  I have reviewed the content of the documentation and have edited it as needed. I have personally performed the services documented here and the documentation accurately represents those services and the decisions I have made.      Electronically signed by:  ***      Again, thank you for allowing me to participate in the care of your patient.      Sincerely,    Ruchi Rausch MD

## 2024-06-25 NOTE — PATIENT INSTRUCTIONS
You were seen in the ENT Clinic today by Dr. Rausch. If you have any questions or concerns after your appointment, please contact us (see below)      Please return to clinic in 6 months    How to Contact Us:  Send a [employer] message to your provider. Our team will respond to you via [employer]. Occasionally, we will need to call you to get further information.  For urgent matters (Monday-Friday), call the ENT Clinic: [phone] and speak with a call center team member - they will route your call appropriately.   If you'd like to speak directly with a nurse, please find our contact information below. We do our best to check voicemail frequently throughout the day, and will work to call you back within 1-2 days. For urgent matters, please use the general clinic phone numbers listed above.    Hina ADAMS, RN, BSN  RN Care Coordinator, ENT Clinic  [facility]  Direct: [phone]  Jacqueline DILLARD LPN  Direct: [phone]

## 2024-07-12 DIAGNOSIS — E03.9 HYPOTHYROIDISM, UNSPECIFIED TYPE: Primary | ICD-10-CM

## 2024-07-12 NOTE — PROGRESS NOTES
"Perri Bahena has an upcoming lab appointment. Her appointment note says \"Keyla\". Please review and place orders if needed.  Thank you,  [facility]  "

## 2024-10-08 ENCOUNTER — PATIENT OUTREACH (OUTPATIENT)
Dept: CARE COORDINATION | Facility: CLINIC | Age: 59
End: 2024-10-08
Payer: COMMERCIAL

## 2024-10-23 ENCOUNTER — TELEPHONE (OUTPATIENT)
Dept: FAMILY MEDICINE | Facility: CLINIC | Age: 59
End: 2024-10-23
Payer: COMMERCIAL

## 2024-10-23 NOTE — TELEPHONE ENCOUNTER
Patient Quality Outreach    Patient is due for the following:   Colon Cancer Screening  Breast Cancer Screening - Mammogram  Cervical Cancer Screening - PAP Needed  Physical Preventive Adult Physical    Next Steps:   Schedule a Adult Preventative    Type of outreach:    Sent letter.    Next Steps:  Reach out within 90 days via Letter.    Max number of attempts reached: No. Will try again in 90 days if patient still on fail list.    Questions for provider review:    None           Coleen Marie, [facility]  Chart routed to none, letter sent.

## 2024-10-23 NOTE — LETTER
October 23, 2024    To  Perri Scherer  [address] [facility] [address]    Your team at [facility] cares about your health. We have reviewed your chart and based on our findings; we are making the following recommendations to better manage your health.     You are in particular need of attention regarding the following:     Sending a reminder letter regarding the FIT test (colon cancer screening kit) that was given to you at your clinic visit on 6-7-2024.  Please complete the testing at your conveniance.  Check the sample bottle for the expiration date.  If you are needing a new kit please let us know and we can you a new one in the mail.    Schedule Annual MAMMOGRAPHY. [facility] scheduling number is [phone] or schedule in [employer] (self referral).  Schedule a primary care office visit with your provider for a Pap Smear to screen for Cervical Cancer.  PREVENTATIVE VISIT: Physical    If you have already completed these items, please contact the clinic via phone or   [employer] so your care team can review and update your records. Thank you for   choosing [facility] for your healthcare needs. For any questions,   concerns, or to schedule an appointment please contact our clinic.    Healthy Regards,    Your [facility] Care Team

## 2025-01-02 NOTE — PROGRESS NOTES
"[facility] AND THRO[facility] CLIN[facility] [address] [address]  Phone: [phone]  Fax: [phone]    Patient:  Perri Scherer, Date of birth 1965  Date of Visit:  01/09/2025  Referring Provider Referred Self      Assessment & Plan      Perri Scherer is a 59 year old female with a history of left eustachian tube dysfunction, who is being seen for follow-up. Physical examination showed the T-tube in position but the prosthesis fully uncovered. The cartilage backing remains posterior to the prothesis, which has not extruded through the tympanic membrane. She has been advised to follow-up with one of our APPs or a local ENT for routine check-ups which we would typically do on a 6 month basis. She is in agreement with this plan, and will return as needed.     HPI  Perri Scherer is a 59 year old female with a history of left eustachian tube dysfunction, who is being seen for follow-up. She is s/p left tympanomastoidectomy with cartilage 6/14/2023, and left revision tympanomastoidectomy with ossicular chain reconstruction on 12/13/23. She was prevously seen on 06/24/2024, where a left myringotomy and T-tube insertion was performed.     She reports no issues with her ears -  no otalgia, otorrhea or changes in hearing.    /79   Pulse 69   Temp 98  F (36.7  C)   Ht 1.6 m (5' 3\")   Wt 52.6 kg (116 lb)   SpO2 95%   BMI 20.55 kg/m     Physical examination:  female in no acute distress.  Alert and answering questions appropriately.  HB 1/6 bilaterally.  Both ears examined under the microscope.   Right ear canal clear, TM is intact and the middle ear is aerated.   The left ear canal is clear. The t-tube is in position in the anterior quadrant. The prosthesis appear to have moved even more anteriorly, although it may be that the cartilage continues to move posteriorly. Now, the prothesis is fully uncovered and the cartilage backing is posterior to the " prosthesis but is not extruding through the TM, although it is clearing touching the TM. There are no retraction pockets noted. The middle ear is otherwise aerated.     Scribe Disclosure:   I, Roma Monet, am serving as a scribe; to document services personally performed by Ruchi Rausch MD -based on data collection and the provider's statements to me.     Provider Disclosure:  I agree with above History, Review of Systems, Physical exam and Plan.  I have reviewed the content of the documentation and have edited it as needed. I have personally performed the services documented here and the documentation accurately represents those services and the decisions I have made.

## 2025-01-09 ENCOUNTER — OFFICE VISIT (OUTPATIENT)
Dept: OTOLARYNGOLOGY | Facility: CLINIC | Age: 60
End: 2025-01-09
Payer: COMMERCIAL

## 2025-01-09 VITALS
SYSTOLIC BLOOD PRESSURE: 129 MMHG | HEART RATE: 69 BPM | HEIGHT: 63 IN | OXYGEN SATURATION: 95 % | DIASTOLIC BLOOD PRESSURE: 79 MMHG | TEMPERATURE: 98 F | WEIGHT: 116 LBS | BODY MASS INDEX: 20.55 KG/M2

## 2025-01-09 DIAGNOSIS — H90.A32 MIXED CONDUCTIVE AND SENSORINEURAL HEARING LOSS OF LEFT EAR WITH RESTRICTED HEARING OF RIGHT EAR: ICD-10-CM

## 2025-01-09 DIAGNOSIS — H69.92 DYSFUNCTION OF LEFT EUSTACHIAN TUBE: Primary | ICD-10-CM

## 2025-01-09 PROCEDURE — 99212 OFFICE O/P EST SF 10 MIN: CPT | Mod: 25 | Performed by: OTOLARYNGOLOGY

## 2025-01-09 PROCEDURE — 92504 EAR MICROSCOPY EXAMINATION: CPT | Performed by: OTOLARYNGOLOGY

## 2025-01-09 ASSESSMENT — PAIN SCALES - GENERAL: PAINLEVEL_OUTOF10: NO PAIN (0)

## 2025-01-09 NOTE — LETTER
"1/9/2025      RE: Perri Scherer  [address]  [facility] [address]         [facility] NOSE AND TH[facility]AT CLIN[facility] [address]  4[address] FLO[address]  [facility] [address]  Phone: [phone]  Fax: [phone]    Patient:  Perri Scherer, Date of birth 1965  Date of Visit:  01/09/2025  Referring Provider Referred Self      Assessment & Plan     Perri Scherer is a 59 year old female with a history of left eustachian tube dysfunction, who is being seen for follow-up. Physical examination showed the T-tube in position but the prosthesis fully uncovered. The cartilage backing remains posterior to the prothesis, which has not extruded through the tympanic membrane. She has been advised to follow-up with one of our APPs or a local ENT for routine check-ups which we would typically do on a 6 month basis. She is in agreement with this plan, and will return as needed.     HPI  Perri Scherer is a 59 year old female with a history of left eustachian tube dysfunction, who is being seen for follow-up. She is s/p left tympanomastoidectomy with cartilage 6/14/2023, and left revision tympanomastoidectomy with ossicular chain reconstruction on 12/13/23. She was prevously seen on 06/24/2024, where a left myringotomy and T-tube insertion was performed.     She reports no issues with her ears -  no otalgia, otorrhea or changes in hearing.    /79   Pulse 69   Temp 98  F (36.7  C)   Ht 1.6 m (5' 3\")   Wt 52.6 kg (116 lb)   SpO2 95%   BMI 20.55 kg/m     Physical examination:  female in no acute distress.  Alert and answering questions appropriately.  HB 1/6 bilaterally.  Both ears examined under the microscope.   Right ear canal clear, TM is intact and the middle ear is aerated.   The left ear canal is clear. The t-tube is in position in the anterior quadrant. The prosthesis appear to have moved even more anteriorly, although it may be that the cartilage continues to move posteriorly. Now, " the prothesis is fully uncovered and the cartilage backing is posterior to the prosthesis but is not extruding through the TM, although it is clearing touching the TM. There are no retraction pockets noted. The middle ear is otherwise aerated.     Scribe Disclosure:   I, Roma Monet, am serving as a scribe; to document services personally performed by Ruchi Rausch MD -based on data collection and the provider's statements to me.     Provider Disclosure:  I agree with above History, Review of Systems, Physical exam and Plan.  I have reviewed the content of the documentation and have edited it as needed. I have personally performed the services documented here and the documentation accurately represents those services and the decisions I have made.        Ruchi Rausch MD

## 2025-01-09 NOTE — PATIENT INSTRUCTIONS
You were seen in the ENT Clinic today by Dr. Rausch. If you have any questions or concerns after your appointment, please contact us (see below)      Please return to clinic in 6 month to see provider in [place]    How to Contact Us:  Send a [employer] message to your provider. Our team will respond to you via [employer]. Occasionally, we will need to call you to get further information.  For urgent matters (Monday-Friday), call the ENT Clinic: [phone] and speak with a call center team member - they will route your call appropriately.   If you'd like to speak directly with a nurse, please find our contact information below. We do our best to check voicemail frequently throughout the day, and will work to call you back within 1-2 days. For urgent matters, please use the general clinic phone numbers listed above.    Hina ADAMS, RN, BSN  RN Care Coordinator, ENT Clinic  [facility]  Direct: [phone]  Jacqueline DILLARD LPN  Direct: [phone]

## 2025-01-09 NOTE — NURSING NOTE
"Chief Complaint   Patient presents with    RECHECK     Follow up     Blood pressure 129/79, pulse 69, temperature 98  F (36.7  C), height 1.6 m (5' 3\"), weight 52.6 kg (116 lb), SpO2 95%.  Rex Nur LPN    "

## 2025-02-19 ENCOUNTER — TELEPHONE (OUTPATIENT)
Dept: FAMILY MEDICINE | Facility: CLINIC | Age: 60
End: 2025-02-19
Payer: COMMERCIAL

## 2025-02-19 NOTE — TELEPHONE ENCOUNTER
Patient Quality Outreach    Patient is due for the following:   Colon Cancer Screening  Breast Cancer Screening - Mammogram  Cervical Cancer Screening - PAP Needed  Physical Preventive Adult Physical    Action(s) Taken:   Schedule a Adult Preventative    Type of outreach:    Sent letter.    Questions for provider review:    None           Coleen Marie, [facility]  Chart routed to none, letter sent.

## 2025-02-19 NOTE — LETTER
February 19, 2025    To  Perri Scherer  [address] [facility] [address]    Your team at [facility] cares about your health. We have reviewed your chart and based on our findings; we are making the following recommendations to better manage your health.     You are in particular need of attention regarding the following:     Sending a reminder letter regarding the FIT test (colon cancer screening kit) that was given to you at your clinic visit on 6-7-2024.  Please complete the testing at your conveniance.  Check the sample bottle for the expiration date.  Please let us know if your are needing a new testing kit.    Colon cancer is now the second leading cause of cancer-related deaths in the [facility] for both men and women and there are over 130,000 new cases and 50,000 deaths per year from colon cancer. Colonoscopies can prevent 90-95% of these deaths. Problem lesions can be removed before they ever become cancer. This test is not only looking for cancer, but also getting rid of precancerous lesions.   If you are under/uninsured, we recommend you contact the [facility] is a free colorectal cancer screening program that provides colonoscopies for eligible under/uninsured [place] men and women. If you are interested in receiving a free colonoscopy, please call [facility] at t [phone] (mention code [email or web address]) to see if you're eligible. Please have them send us the results.   Schedule Annual MAMMOGRAPHY. [facility] scheduling number is [phone] or schedule in [facility] (self referral).  Schedule a primary care office visit with your provider for a Pap Smear to screen for Cervical Cancer.  PREVENTATIVE VISIT: Physical    If you have already completed these items, please contact the clinic via phone or   [facility] so your care team can review and update your records. Thank you for   choosing [facility] for your healthcare needs. For any questions,    concerns, or to schedule an appointment please contact our clinic.    Healthy Regards,      Your [facility] Care Team            Electronically signed

## 2025-03-25 ENCOUNTER — OFFICE VISIT (OUTPATIENT)
Dept: OBGYN | Facility: CLINIC | Age: 60
End: 2025-03-25
Payer: COMMERCIAL

## 2025-03-25 VITALS
WEIGHT: 118 LBS | RESPIRATION RATE: 18 BRPM | HEIGHT: 63 IN | DIASTOLIC BLOOD PRESSURE: 85 MMHG | SYSTOLIC BLOOD PRESSURE: 129 MMHG | HEART RATE: 77 BPM | TEMPERATURE: 97.6 F | BODY MASS INDEX: 20.91 KG/M2

## 2025-03-25 DIAGNOSIS — Z01.419 WOMEN'S ANNUAL ROUTINE GYNECOLOGICAL EXAMINATION: Primary | ICD-10-CM

## 2025-03-25 DIAGNOSIS — L29.0 ANAL ITCHING: ICD-10-CM

## 2025-03-25 LAB
HPV HR 12 DNA CVX QL NAA+PROBE: NEGATIVE
HPV16 DNA CVX QL NAA+PROBE: NEGATIVE
HPV18 DNA CVX QL NAA+PROBE: NEGATIVE
HUMAN PAPILLOMA VIRUS FINAL DIAGNOSIS: NORMAL

## 2025-03-25 PROCEDURE — 3079F DIAST BP 80-89 MM HG: CPT | Performed by: PHYSICIAN ASSISTANT

## 2025-03-25 PROCEDURE — 87624 HPV HI-RISK TYP POOLED RSLT: CPT | Performed by: PHYSICIAN ASSISTANT

## 2025-03-25 PROCEDURE — 99459 PELVIC EXAMINATION: CPT | Performed by: PHYSICIAN ASSISTANT

## 2025-03-25 PROCEDURE — 99386 PREV VISIT NEW AGE 40-64: CPT | Performed by: PHYSICIAN ASSISTANT

## 2025-03-25 PROCEDURE — 3074F SYST BP LT 130 MM HG: CPT | Performed by: PHYSICIAN ASSISTANT

## 2025-03-25 RX ORDER — HYDROCORTISONE 25 MG/G
CREAM TOPICAL 2 TIMES DAILY PRN
Qty: 30 G | Refills: 0 | Status: SHIPPED | OUTPATIENT
Start: 2025-03-25

## 2025-03-25 NOTE — NURSING NOTE
"Initial /85 (BP Location: Right arm, Patient Position: Chair, Cuff Size: Adult Regular)   Pulse 77   Temp 97.6  F (36.4  C) (Tympanic)   Resp 18   Ht 1.6 m (5' 3\")   Wt 53.5 kg (118 lb)   BMI 20.90 kg/m   Estimated body mass index is 20.9 kg/m  as calculated from the following:    Height as of this encounter: 1.6 m (5' 3\").    Weight as of this encounter: 53.5 kg (118 lb). .    "

## 2025-03-25 NOTE — PROGRESS NOTES
" SUBJECTIVE:   CC: Perri Scherer is an 59 year old woman who presents for preventive health visit.       Patient has been advised of split billing requirements and indicates understanding: Yes  Healthy Habits:  Do you get at least three servings of calcium containing foods daily (dairy, green leafy vegetables, etc.)? yes  Amount of exercise or daily activities, outside of work: 5 day(s) per week  Problems taking medications regularly No  Medication side effects: No  Have you had an eye exam in the past two years? yes  Do you see a dentist twice per year? no  Do you have sleep apnea, excessive snoring or daytime drowsiness?no    Patient here for annual gyn exam. Patient is due for mammogram, pap smear, and colonoscopy which have all been ordered. Offered routine labs today, but patient declined.     She is postmenopausal and denies vaginal bleeding.   Patient with anal itching \"for a long time,\" but has worsened over the past 2 months. She denies new medications, foods, travel. Will treat with 2 week period of topical steroid cream and follow up with GI specialist for further treatment management and work up.     Today's PHQ-2 Score:       3/25/2025     8:58 AM 2025     7:44 AM   PHQ-2 (  [employer])   Q1: Little interest or pleasure in doing things 0 0   Q2: Feeling down, depressed or hopeless 0 0   PHQ-2 Score 0 0       Abuse: Current or Past(Physical, Sexual or Emotional)- No  Do you feel safe in your environment? Yes        Social History     Tobacco Use    Smoking status: Every Day     Current packs/day: 0.00     Types: Cigarettes     Last attempt to quit: 2017     Years since quittin.4     Passive exposure: Never    Smokeless tobacco: Never    Tobacco comments:     Quit 3 weeks prior to the 24 visit.     Was using an E-cig 3mg; 5-6 cigarettes a day    Substance Use Topics    Alcohol use: Yes     Comment: weekends mostly.  10 beers per week     If you drink alcohol do you typically have >3 " drinks per day or >7 drinks per week? No                     Reviewed orders with patient.  Reviewed health maintenance and updated orders accordingly - Yes  Labs reviewed in EPIC  BP Readings from Last 3 Encounters:   25 129/85   25 129/79   24 110/71    Wt Readings from Last 3 Encounters:   25 53.5 kg (118 lb)   25 52.6 kg (116 lb)   24 52.6 kg (116 lb)                  Patient Active Problem List   Diagnosis    Nicotine use disorder    Family history of diabetes mellitus    Hyperlipidemia LDL goal <160    Hypothyroidism    Benign essential hypertension    Hypothyroidism, unspecified type    Other migraine without status migrainosus, not intractable    Cholesteatoma, left     Past Surgical History:   Procedure Laterality Date    HC REPAIR OF HAMMERTOE,ONE      Description: Arthroplasty For Hammertoe;  Recorded: 2008;  Comments: B/L    MYRINGOTOMY, INSERT TUBE, COMBINED Left 2023    Procedure: COMBINED MYRINGOTOMY, LEFT INSERT TUBE;  Surgeon: Ruchi Rausch MD;  Location: [facility] OR    TUBAL LIGATION  1992    TYMPANOMASTOIDECTOMY WITH FACIAL MONITORING Left 2023    Procedure: LEFT TYMPANOMASTOIDECTOMY, WITH FACIAL NERVE MONITORING, CARTILAGE BACKING;  Surgeon: Ruchi Rausch MD;  Location: [facility] OR    TYMPANOMASTOIDECTOMY WITH FACIAL MONITORING Left 2023    Procedure: LEFT REVISION TYMPANOMASTOIDECTOMY, WITH FACIAL NERVE MONITORING, OSSICULAR CHAIN RECONSTRUCTION;  Surgeon: Ruchi Rausch MD;  [place] [facility] [place]    Z LIGATE FALLOPIAN TUBE      Description: Tubal Ligation;  Recorded: 2008;       Social History     Tobacco Use    Smoking status: Every Day     Current packs/day: 0.00     Types: Cigarettes     Last attempt to quit: 2017     Years since quittin.4     Passive exposure: Never    Smokeless tobacco: Never    Tobacco comments:     Quit 3 weeks prior to the 24 visit.     Was using an E-cig 3mg; 5-6 cigarettes a day    Substance Use  Topics    Alcohol use: Yes     Comment: weekends mostly.  10 beers per week     Family History   Problem Relation Age of Onset    Diabetes Mother     C.A.D. Father     Alzheimer Disease Maternal Grandfather     Substance Abuse Brother     Other - See Comments Sister         plane crash    Melanoma No family hx of          Current Outpatient Medications   Medication Sig Dispense Refill    clobetasol (TEMOVATE) 0.05 % external cream Apply topically daily as needed (psoriasis) Apply sparingly to affected area twice daily for 14 days.  Do not apply to face. 30 g 0    hydrocortisone, Perianal, (HYDROCORTISONE) 2.5 % cream Place rectally 2 times daily as needed for other (anal itching). 30 g 0    levothyroxine (SYNTHROID/LEVOTHROID) 75 MCG tablet Take 1 tablet (75 mcg) by mouth daily 90 tablet 3    lisinopril (ZESTRIL) 20 MG tablet Take 1 tablet (20 mg) by mouth daily 90 tablet 3    nystatin-triamcinolone (MYCOLOG II) cream Apply topically 2 times daily 60 g 1    ofloxacin (FLOXIN) 0.3 % otic solution Place 5 drops Into the left ear 2 times daily 10 mL 0    SUMAtriptan (IMITREX) 50 MG tablet Take 1 tablet (50 mg) by mouth at onset of headache for migraine May repeat in 2 hours. Max 4 tablets/24 hours. 9 tablet 1    tacrolimus (PROTOPIC) 0.1 % ointment Apply twice daily as needed. 30 g 1    benzonatate (TESSALON) 100 MG capsule Take 1 capsule (100 mg) by mouth 3 times daily as needed for cough (Patient not taking: Reported on 3/25/2025) 42 capsule 0    butalbital-acetaminophen-caffeine (FIORICET/ESGIC) -40 MG per tablet Take 1 tablet by mouth every 6 hours as needed (Patient not taking: Reported on 3/25/2025) 15 tablet 0     Allergies   Allergen Reactions    Norvasc [Amlodipine] Other (See Comments)     Dizziness       Recent Labs   Lab Test 06/07/24  0720 10/02/23  1750 03/23/23  1329 11/29/22  1415 08/16/21  0732 08/17/20  1512 07/24/19  0719   *  --   --   --  138*  --  116*   HDL 75  --   --   --  75  --   86   TRIG 49  --   --   --  102  --  116   CR 0.76 0.66  --    < > 0.72 0.83 0.64   GFRESTIMATED 90 >90  --    < > >90 79 >90   GFRESTBLACK  --   --   --   --   --  >90 >90   POTASSIUM 4.6 4.2  --    < > 4.4 4.1 4.0   TSH 6.01*  --  1.39   < > 2.52 0.91  --     < > = values in this interval not displayed.              6/5/2023     6:43 AM   Breast CA Risk Assessment (FHS-7)   Do you have a family history of breast, colon, or ovarian cancer? No / Unknown        Proxy-reported       click delete button to remove this line now  Mammogram Screening - Mammogram every 1-2 years updated in Health Maintenance based on mutual decision making  Pertinent mammograms are reviewed under the imaging tab.    Pertinent mammograms are reviewed under the imaging tab.  History of abnormal Pap smear: No - age 30- 64 PAP with HPV every 5 years recommended      Latest Ref Rng & Units 11/7/2019    10:18 AM 1/19/2015    12:00 AM   PAP / HPV   PAP (Historical)  NIL  NIL    HPV 16 DNA NEG^Negative Negative     HPV 18 DNA NEG^Negative Negative     Other HR HPV NEG^Negative Negative       Reviewed and updated as needed this visit by clinical staff   Tobacco  Allergies  Meds   Med Hx  Surg Hx  Fam Hx  Soc Hx        Reviewed and updated as needed this visit by Provider                  Past Medical History:   Diagnosis Date    HTN (hypertension), benign     Hypothyroidism     Psoriasis     Smoking       Past Surgical History:   Procedure Laterality Date    HC REPAIR OF HAMMERTOE,ONE      Description: Arthroplasty For Hammertoe;  Recorded: 05/01/2008;  Comments: B/L    MYRINGOTOMY, INSERT TUBE, COMBINED Left 12/13/2023    Procedure: COMBINED MYRINGOTOMY, LEFT INSERT TUBE;  Surgeon: Ruchi Rasuch MD;  Location: [facility] OR    TUBAL LIGATION  1992    TYMPANOMASTOIDECTOMY WITH FACIAL MONITORING Left 6/14/2023    Procedure: LEFT TYMPANOMASTOIDECTOMY, WITH FACIAL NERVE MONITORING, CARTILAGE BACKING;  Surgeon: Ruchi Rausch MD;  [place] [facility] [place]     "TYMPANOMASTOIDECTOMY WITH FACIAL MONITORING Left 2023    Procedure: LEFT REVISION TYMPANOMASTOIDECTOMY, WITH FACIAL NERVE MONITORING, OSSICULAR CHAIN RECONSTRUCTION;  Surgeon: Ruchi Rausch MD;  [place] [facility] [place]    [facility] LIGATE FALLOPIAN TUBE      Description: Tubal Ligation;  Recorded: 2008;     OB History    Para Term  AB Living   2 2 2 0 0 2   SAB IAB Ectopic Multiple Live Births   0 0 0 0 0      # Outcome Date GA Lbr Rufino/2nd Weight Sex Type Anes PTL Lv   2 Term            1 Term                ROS:  CONSTITUTIONAL: NEGATIVE for fever, chills, change in weight  INTEGUMENTARY/SKIN: NEGATIVE for worrisome rashes, moles or lesions  EYES: NEGATIVE for vision changes or irritation  ENT: NEGATIVE for ear, mouth and throat problems  RESP: NEGATIVE for significant cough or SOB  BREAST: NEGATIVE for masses, tenderness or discharge  CV: NEGATIVE for chest pain, palpitations or peripheral edema  GI: NEGATIVE for nausea, abdominal pain, heartburn, or change in bowel habits  : NEGATIVE for unusual urinary or vaginal symptoms. No vaginal bleeding.  MUSCULOSKELETAL: NEGATIVE for significant arthralgias or myalgia  NEURO: NEGATIVE for weakness, dizziness or paresthesias  ENDOCRINE: NEGATIVE for temperature intolerance, skin/hair changes  HEME/ALLERGY/IMMUNE: NEGATIVE for bleeding problems  PSYCHIATRIC: NEGATIVE for changes in mood or affect     OBJECTIVE:   /85 (BP Location: Right arm, Patient Position: Chair, Cuff Size: Adult Regular)   Pulse 77   Temp 97.6  F (36.4  C) (Tympanic)   Resp 18   Ht 1.6 m (5' 3\")   Wt 53.5 kg (118 lb)   BMI 20.90 kg/m    EXAM:  GENERAL APPEARANCE: healthy, alert and no distress  EYES: Eyes grossly normal to inspection, PERRL and conjunctivae and sclerae normal  HENT: ear canals and TM's normal, nose and mouth without ulcers or lesions, oropharynx clear and oral mucous membranes moist  NECK: no adenopathy, no asymmetry, masses, or scars and thyroid " "normal to palpation  RESP: lungs clear to auscultation - no rales, rhonchi or wheezes  BREAST: normal without masses, tenderness or nipple discharge and no palpable axillary masses or adenopathy  CV: regular rate and rhythm, normal S1 S2, no S3 or S4, no murmur, click or rub, no peripheral edema and peripheral pulses strong  ABDOMEN: soft, nontender, no hepatosplenomegaly, no masses and bowel sounds normal   (female): normal female external genitalia, normal urethral meatus, vaginal mucosal atrophy noted, normal cervix, adnexae, and uterus without masses or abnormal discharge  Rectum: no external hemorrhoids or anal fissures noted. No change in skin color or open sores/lesions. Area is clean and dry.   MS: no musculoskeletal defects are noted and gait is age appropriate without ataxia  SKIN: no suspicious lesions or rashes  NEURO: Normal strength and tone, sensory exam grossly normal, mentation intact and speech normal  PSYCH: mentation appears normal and affect normal/bright      ASSESSMENT/PLAN:   Perri was seen today for gyn exam.    Diagnoses and all orders for this visit:    Women's annual routine gynecological examination  -     MA Screen Bilateral w/Clyde; Future  -     HPV and Gynecologic Cytology Panel - Recommended Age 30-65 Years  -     Colonoscopy Screening  Referral; Future  -     Gynecologic Cytology (PAP)    Anal itching  -     hydrocortisone, Perianal, (HYDROCORTISONE) 2.5 % cream; Place rectally 2 times daily as needed for other (anal itching).    Patient here for annual gyn exam. Patient is due for mammogram, pap smear, and colonoscopy which have all been ordered. Offered routine labs today, but patient declined.     She is postmenopausal and denies vaginal bleeding.   Patient with anal itching \"for a long time,\" but has worsened over the past 2 months. She denies new medications, foods, travel. Will treat with 2 week period of topical steroid cream and follow up with GI specialist for " "further treatment management and work up.     Patient has been advised of split billing requirements and indicates understanding: Yes  COUNSELING:   Reviewed preventive health counseling, as reflected in patient instructions       Regular exercise       Healthy diet/nutrition       Vision screening    Estimated body mass index is 20.9 kg/m  as calculated from the following:    Height as of this encounter: 1.6 m (5' 3\").    Weight as of this encounter: 53.5 kg (118 lb).        She reports that she has been smoking cigarettes. She has never been exposed to tobacco smoke. She has never used smokeless tobacco.  Nicotine/Tobacco Cessation Plan  Information offered: Patient not interested at this time      Counseling Resources:  ATP IV Guidelines  Pooled Cohorts Equation Calculator  Breast Cancer Risk Calculator  BRCA-Related Cancer Risk Assessment: FHS-7 Tool  FRAX Risk Assessment  ICSI Preventive Guidelines  Dietary Guidelines for Americans, 2010  USDA's MyPlate  ASA Prophylaxis  Lung CA Screening    Romi Garsia PA-C  [facility] [place]   "

## 2025-03-25 NOTE — PATIENT INSTRUCTIONS
Dietary irritants   Coffee   Cola   Beer   Tomatoes   Chocolate   Tea   Citrus fruits   Vaginal dryness: oil based lubricants like coconut oil for intercourse  Vaginal hyaluronic vaginal suppositories 2-3 times per week to help with vaginal dryness ([employer] Rev[employer]e is one brand that patients really like)  If interested in the future we can prescribe vaginal estrogen cream to help with vaginal dryness and painful intercourse. You would need to schedule an appointment for this.

## 2025-03-26 ENCOUNTER — PATIENT OUTREACH (OUTPATIENT)
Dept: CARE COORDINATION | Facility: CLINIC | Age: 60
End: 2025-03-26
Payer: COMMERCIAL

## 2025-07-27 DIAGNOSIS — I10 BENIGN ESSENTIAL HYPERTENSION: ICD-10-CM

## 2025-07-27 DIAGNOSIS — E03.9 HYPOTHYROIDISM, UNSPECIFIED TYPE: ICD-10-CM

## 2025-07-29 DIAGNOSIS — I10 BENIGN ESSENTIAL HYPERTENSION: Primary | ICD-10-CM

## 2025-07-29 RX ORDER — LEVOTHYROXINE SODIUM 75 UG/1
75 TABLET ORAL DAILY
Qty: 90 TABLET | Refills: 0 | Status: SHIPPED | OUTPATIENT
Start: 2025-07-29

## 2025-07-29 RX ORDER — LISINOPRIL 20 MG/1
20 TABLET ORAL DAILY
Qty: 90 TABLET | Refills: 0 | Status: SHIPPED | OUTPATIENT
Start: 2025-07-29

## 2025-08-07 ENCOUNTER — OFFICE VISIT (OUTPATIENT)
Dept: OTOLARYNGOLOGY | Facility: CLINIC | Age: 60
End: 2025-08-07
Payer: COMMERCIAL

## 2025-08-07 ENCOUNTER — LAB (OUTPATIENT)
Dept: LAB | Facility: CLINIC | Age: 60
End: 2025-08-07
Payer: COMMERCIAL

## 2025-08-07 VITALS
HEART RATE: 73 BPM | TEMPERATURE: 98 F | WEIGHT: 112 LBS | SYSTOLIC BLOOD PRESSURE: 112 MMHG | BODY MASS INDEX: 19.84 KG/M2 | DIASTOLIC BLOOD PRESSURE: 76 MMHG

## 2025-08-07 DIAGNOSIS — E03.9 HYPOTHYROIDISM, UNSPECIFIED TYPE: ICD-10-CM

## 2025-08-07 DIAGNOSIS — H69.92 DYSFUNCTION OF LEFT EUSTACHIAN TUBE: Primary | ICD-10-CM

## 2025-08-07 DIAGNOSIS — I10 BENIGN ESSENTIAL HYPERTENSION: ICD-10-CM

## 2025-08-07 LAB
ANION GAP SERPL CALCULATED.3IONS-SCNC: 9 MMOL/L (ref 7–15)
BUN SERPL-MCNC: 10.2 MG/DL (ref 8–23)
CALCIUM SERPL-MCNC: 9.5 MG/DL (ref 8.8–10.4)
CHLORIDE SERPL-SCNC: 98 MMOL/L (ref 98–107)
CREAT SERPL-MCNC: 0.74 MG/DL (ref 0.51–0.95)
EGFRCR SERPLBLD CKD-EPI 2021: >90 ML/MIN/1.73M2
GLUCOSE SERPL-MCNC: 97 MG/DL (ref 70–99)
HCO3 SERPL-SCNC: 25 MMOL/L (ref 22–29)
POTASSIUM SERPL-SCNC: 4.6 MMOL/L (ref 3.4–5.3)
SODIUM SERPL-SCNC: 132 MMOL/L (ref 135–145)
T4 FREE SERPL-MCNC: 1.48 NG/DL (ref 0.9–1.7)
TSH SERPL DL<=0.005 MIU/L-ACNC: 10.36 UIU/ML (ref 0.3–4.2)

## 2025-08-07 ASSESSMENT — PAIN SCALES - GENERAL: PAINLEVEL_OUTOF10: NO PAIN (0)

## 2025-08-19 DIAGNOSIS — Z12.11 SCREENING FOR COLON CANCER: Primary | ICD-10-CM

## (undated) DEVICE — DRAPE WARMER 66X44" ORS-300

## (undated) DEVICE — SOL NACL 0.9% IRRIG 1000ML BOTTLE 2F7124

## (undated) DEVICE — ESU ELEC BLADE 2.75" COATED/INSULATED E1455

## (undated) DEVICE — ESU GROUND PAD ADULT W/CORD E7507

## (undated) DEVICE — SU VICRYL 3-0 PS-2 27" UND J427H

## (undated) DEVICE — LINEN TOWEL PACK X5 5464

## (undated) DEVICE — GLOVE PROTEXIS POWDER FREE SMT 6.5  2D72PT65X

## (undated) DEVICE — DRAPE U SPLIT 74X120" 29440

## (undated) DEVICE — DRSG TEGADERM 2 3/8X2 3/4" 1624W

## (undated) DEVICE — SUCTION MANIFOLD NEPTUNE 2 SYS 1 PORT 702-025-000

## (undated) DEVICE — SU DERMABOND PRINEO 22CM CLR222US

## (undated) DEVICE — BUR STRK ROUND DIAMOND 4.0MM COARSE S2 TT DRIVE 5540-013-040

## (undated) DEVICE — PREP PAD ALCOHOL 6818

## (undated) DEVICE — PREP POVIDONE IODINE SOLUTION 10% 4OZ BOTTLE 29906-004

## (undated) DEVICE — STRAP KNEE/BODY 31143004

## (undated) DEVICE — PACK EAR CUSTOM ASC

## (undated) DEVICE — TUBING STRYKER IRRIGATION CASSETTE 5400-050-001

## (undated) DEVICE — BUR STRK ROUND 6.0MM MULTI-FLUTE 4H S2 PIDRIVE 5540-010-060

## (undated) DEVICE — BLADE CLIPPER SGL USE 9680

## (undated) DEVICE — BUR STRK ROUND DIAMOND 4.0MM 5H DRIVE 5540-012-040

## (undated) DEVICE — DRAPE MICROSCOPE LEICA 54X120" 09-MK653

## (undated) DEVICE — SPONGE SURGIFOAM 100 1974

## (undated) DEVICE — NIM ELEC SUBDERMAL NDL 3PAIR/BOX

## (undated) DEVICE — PREP SKIN SCRUB TRAY 4461A

## (undated) DEVICE — SYR 10ML LL W/O NDL

## (undated) DEVICE — DRSG COTTON BALL 6PK LCB62

## (undated) DEVICE — WIPE INSTRUMENT MEROCEL 400200

## (undated) DEVICE — DRSG GLASSCOCK ADULT S-1000

## (undated) DEVICE — SOL WATER IRRIG 500ML BOTTLE 2F7113

## (undated) DEVICE — DRAIN PENROSE 0.25"X18" LATEX FREE GR201

## (undated) DEVICE — BLADE KNIFE BEAVER MINI BEAVER6400

## (undated) DEVICE — BLADE KNIFE BEAVER MYRINGOTOMY 377100

## (undated) DEVICE — BONE WAX 2.5GM W31G

## (undated) DEVICE — BUR STRK ROUND DIAMOND 2.0MM COARSE EXT 5540-013-320

## (undated) DEVICE — RX BACITRACIN OINTMENT 14G 0.5OZ 45802-060-01

## (undated) DEVICE — TUBING SUCTION MEDI-VAC 1/4"X20' N620A

## (undated) DEVICE — TUBE EAR GOODE T SIL 10-16010

## (undated) DEVICE — PREP POVIDONE-IODINE 7.5% SCRUB 4OZ BOTTLE MDS093945

## (undated) DEVICE — SOL NACL 0.9% INJ 1000ML BAG 2B1324X

## (undated) DEVICE — CLOSURE SYS SKIN PREMIERPRO EXOFIN FUSION 4X22CM STRL 3472

## (undated) DEVICE — BUR STRK ROUND 5.0MM MULTI-FLUTE 5H S2 PIDRIVE 5540-010-050

## (undated) DEVICE — BLADE KNIFE BEAVER TYMPANOPLASTY 2.5MM W/60D DOWN 377200

## (undated) RX ORDER — HYDROCODONE BITARTRATE AND ACETAMINOPHEN 5; 325 MG/1; MG/1
TABLET ORAL
Status: DISPENSED
Start: 2023-06-14

## (undated) RX ORDER — PROPOFOL 10 MG/ML
INJECTION, EMULSION INTRAVENOUS
Status: DISPENSED
Start: 2023-12-13

## (undated) RX ORDER — ACETAMINOPHEN 325 MG/1
TABLET ORAL
Status: DISPENSED
Start: 2023-12-13

## (undated) RX ORDER — DEXAMETHASONE SODIUM PHOSPHATE 10 MG/ML
INJECTION, SOLUTION INTRAMUSCULAR; INTRAVENOUS
Status: DISPENSED
Start: 2023-06-14

## (undated) RX ORDER — PROPOFOL 10 MG/ML
INJECTION, EMULSION INTRAVENOUS
Status: DISPENSED
Start: 2023-06-14

## (undated) RX ORDER — LIDOCAINE HYDROCHLORIDE AND EPINEPHRINE 10; 10 MG/ML; UG/ML
INJECTION, SOLUTION INFILTRATION; PERINEURAL
Status: DISPENSED
Start: 2023-12-13

## (undated) RX ORDER — EPHEDRINE SULFATE 50 MG/ML
INJECTION, SOLUTION INTRAMUSCULAR; INTRAVENOUS; SUBCUTANEOUS
Status: DISPENSED
Start: 2023-06-14

## (undated) RX ORDER — HYDROMORPHONE HYDROCHLORIDE 1 MG/ML
INJECTION, SOLUTION INTRAMUSCULAR; INTRAVENOUS; SUBCUTANEOUS
Status: DISPENSED
Start: 2023-12-13

## (undated) RX ORDER — HYDROMORPHONE HYDROCHLORIDE 1 MG/ML
INJECTION, SOLUTION INTRAMUSCULAR; INTRAVENOUS; SUBCUTANEOUS
Status: DISPENSED
Start: 2023-06-14

## (undated) RX ORDER — FENTANYL CITRATE-0.9 % NACL/PF 10 MCG/ML
PLASTIC BAG, INJECTION (ML) INTRAVENOUS
Status: DISPENSED
Start: 2023-06-14

## (undated) RX ORDER — EPINEPHRINE NASAL SOLUTION 1 MG/ML
SOLUTION NASAL
Status: DISPENSED
Start: 2023-06-14

## (undated) RX ORDER — ACETAMINOPHEN 325 MG/1
TABLET ORAL
Status: DISPENSED
Start: 2023-06-14

## (undated) RX ORDER — GLYCOPYRROLATE 0.2 MG/ML
INJECTION INTRAMUSCULAR; INTRAVENOUS
Status: DISPENSED
Start: 2023-06-14

## (undated) RX ORDER — OFLOXACIN 3 MG/ML
SOLUTION/ DROPS OPHTHALMIC
Status: DISPENSED
Start: 2023-12-13

## (undated) RX ORDER — ONDANSETRON 2 MG/ML
INJECTION INTRAMUSCULAR; INTRAVENOUS
Status: DISPENSED
Start: 2023-06-14

## (undated) RX ORDER — OFLOXACIN 3 MG/ML
SOLUTION/ DROPS OPHTHALMIC
Status: DISPENSED
Start: 2023-06-14

## (undated) RX ORDER — REMIFENTANIL HYDROCHLORIDE 1 MG/ML
INJECTION, POWDER, LYOPHILIZED, FOR SOLUTION INTRAVENOUS
Status: DISPENSED
Start: 2023-12-13

## (undated) RX ORDER — EPHEDRINE SULFATE 50 MG/ML
INJECTION, SOLUTION INTRAMUSCULAR; INTRAVENOUS; SUBCUTANEOUS
Status: DISPENSED
Start: 2023-12-13

## (undated) RX ORDER — CEFAZOLIN SODIUM 2 G/50ML
SOLUTION INTRAVENOUS
Status: DISPENSED
Start: 2023-12-13

## (undated) RX ORDER — LIDOCAINE HYDROCHLORIDE AND EPINEPHRINE 10; 10 MG/ML; UG/ML
INJECTION, SOLUTION INFILTRATION; PERINEURAL
Status: DISPENSED
Start: 2023-06-14

## (undated) RX ORDER — FENTANYL CITRATE 50 UG/ML
INJECTION, SOLUTION INTRAMUSCULAR; INTRAVENOUS
Status: DISPENSED
Start: 2023-06-14

## (undated) RX ORDER — EPINEPHRINE NASAL SOLUTION 1 MG/ML
SOLUTION NASAL
Status: DISPENSED
Start: 2023-12-13

## (undated) RX ORDER — FENTANYL CITRATE 50 UG/ML
INJECTION, SOLUTION INTRAMUSCULAR; INTRAVENOUS
Status: DISPENSED
Start: 2023-12-13

## (undated) RX ORDER — REMIFENTANIL HYDROCHLORIDE 1 MG/ML
INJECTION, POWDER, LYOPHILIZED, FOR SOLUTION INTRAVENOUS
Status: DISPENSED
Start: 2023-06-14

## (undated) RX ORDER — CEFAZOLIN SODIUM 2 G/50ML
SOLUTION INTRAVENOUS
Status: DISPENSED
Start: 2023-06-14

## (undated) RX ORDER — GABAPENTIN 300 MG/1
CAPSULE ORAL
Status: DISPENSED
Start: 2023-06-14

## (undated) RX ORDER — OXYCODONE HYDROCHLORIDE 5 MG/1
TABLET ORAL
Status: DISPENSED
Start: 2023-12-13